# Patient Record
Sex: FEMALE | Race: WHITE | HISPANIC OR LATINO | Employment: UNEMPLOYED | URBAN - METROPOLITAN AREA
[De-identification: names, ages, dates, MRNs, and addresses within clinical notes are randomized per-mention and may not be internally consistent; named-entity substitution may affect disease eponyms.]

---

## 2017-03-01 ENCOUNTER — HOSPITAL ENCOUNTER (EMERGENCY)
Facility: HOSPITAL | Age: 22
Discharge: HOME | End: 2017-03-01
Admitting: EMERGENCY MEDICINE
Payer: COMMERCIAL

## 2017-03-01 VITALS
OXYGEN SATURATION: 100 % | RESPIRATION RATE: 16 BRPM | SYSTOLIC BLOOD PRESSURE: 117 MMHG | HEART RATE: 84 BPM | TEMPERATURE: 98 F | WEIGHT: 127.25 LBS | DIASTOLIC BLOOD PRESSURE: 61 MMHG

## 2017-03-01 DIAGNOSIS — Z76.0 MEDICATION REFILL: Primary | ICD-10-CM

## 2017-03-01 PROCEDURE — 99281 EMR DPT VST MAYX REQ PHY/QHP: CPT

## 2017-03-01 RX ORDER — ALBUTEROL SULFATE 90 UG/1
2 AEROSOL, METERED RESPIRATORY (INHALATION) EVERY 6 HOURS PRN
COMMUNITY
End: 2018-10-02 | Stop reason: SDUPTHER

## 2017-03-01 RX ORDER — ALBUTEROL SULFATE 90 UG/1
2 AEROSOL, METERED RESPIRATORY (INHALATION) ONCE
Status: COMPLETED | OUTPATIENT
Start: 2017-03-01 | End: 2017-03-01

## 2017-03-01 RX ORDER — ALBUTEROL SULFATE 2.5 MG/3ML
2.5 SOLUTION RESPIRATORY (INHALATION) EVERY 6 HOURS PRN
Qty: 75 ML | Refills: 0 | Status: SHIPPED | OUTPATIENT
Start: 2017-03-01 | End: 2017-03-31

## 2017-03-01 RX ADMIN — ALBUTEROL SULFATE 2 PUFF: 90 AEROSOL, METERED RESPIRATORY (INHALATION) at 08:55

## 2017-05-28 ENCOUNTER — HOSPITAL ENCOUNTER (EMERGENCY)
Facility: HOSPITAL | Age: 22
Discharge: HOME | End: 2017-05-28
Admitting: EMERGENCY MEDICINE
Payer: COMMERCIAL

## 2017-05-28 VITALS
WEIGHT: 131 LBS | OXYGEN SATURATION: 100 % | DIASTOLIC BLOOD PRESSURE: 67 MMHG | RESPIRATION RATE: 18 BRPM | TEMPERATURE: 98.5 F | SYSTOLIC BLOOD PRESSURE: 119 MMHG | HEART RATE: 90 BPM

## 2017-05-28 DIAGNOSIS — J45.901 ACUTE ASTHMA EXACERBATION: Primary | ICD-10-CM

## 2017-05-28 PROCEDURE — 94640 AIRWAY INHALATION TREATMENT: CPT

## 2017-05-28 PROCEDURE — 99283 EMERGENCY DEPT VISIT LOW MDM: CPT

## 2017-05-28 RX ORDER — METHYLPREDNISOLONE 4 MG/1
TABLET ORAL
Qty: 21 TABLET | Refills: 0 | Status: SHIPPED | OUTPATIENT
Start: 2017-05-28 | End: 2018-10-02

## 2017-05-28 RX ORDER — PREDNISONE 20 MG/1
60 TABLET ORAL ONCE
Status: COMPLETED | OUTPATIENT
Start: 2017-05-28 | End: 2017-05-28

## 2017-05-28 RX ORDER — ALBUTEROL SULFATE 90 UG/1
2 AEROSOL, METERED RESPIRATORY (INHALATION) EVERY 4 HOURS PRN
Qty: 1 INHALER | Refills: 0 | Status: SHIPPED | OUTPATIENT
Start: 2017-05-28 | End: 2017-05-30

## 2017-05-28 RX ADMIN — PREDNISONE 60 MG: 20 TABLET ORAL at 15:26

## 2017-05-28 RX ADMIN — ALBUTEROL SULFATE 5 MG: 2.5 SOLUTION RESPIRATORY (INHALATION) at 15:27

## 2017-05-28 RX ADMIN — IPRATROPIUM BROMIDE 0.5 MG: 0.5 SOLUTION RESPIRATORY (INHALATION) at 15:27

## 2017-06-29 ENCOUNTER — ALLSCRIPTS OFFICE VISIT (OUTPATIENT)
Dept: OTHER | Facility: OTHER | Age: 22
End: 2017-06-29

## 2017-08-12 ENCOUNTER — GENERIC CONVERSION - ENCOUNTER (OUTPATIENT)
Dept: OTHER | Facility: OTHER | Age: 22
End: 2017-08-12

## 2017-10-30 ENCOUNTER — GENERIC CONVERSION - ENCOUNTER (OUTPATIENT)
Dept: OTHER | Facility: OTHER | Age: 22
End: 2017-10-30

## 2017-11-07 ENCOUNTER — GENERIC CONVERSION - ENCOUNTER (OUTPATIENT)
Dept: OTHER | Facility: OTHER | Age: 22
End: 2017-11-07

## 2018-01-02 ENCOUNTER — HOSPITAL ENCOUNTER (EMERGENCY)
Facility: HOSPITAL | Age: 23
Discharge: HOME | End: 2018-01-02
Admitting: EMERGENCY MEDICINE
Payer: COMMERCIAL

## 2018-01-02 VITALS
OXYGEN SATURATION: 100 % | RESPIRATION RATE: 18 BRPM | DIASTOLIC BLOOD PRESSURE: 65 MMHG | SYSTOLIC BLOOD PRESSURE: 125 MMHG | WEIGHT: 125 LBS | TEMPERATURE: 98.3 F | HEART RATE: 105 BPM

## 2018-01-02 DIAGNOSIS — H66.92 LEFT OTITIS MEDIA: Primary | ICD-10-CM

## 2018-01-02 DIAGNOSIS — J06.9 URI (UPPER RESPIRATORY INFECTION): ICD-10-CM

## 2018-01-02 PROCEDURE — 99282 EMERGENCY DEPT VISIT SF MDM: CPT

## 2018-01-03 NOTE — ED PROVIDER NOTES
History  Chief Complaint   Patient presents with    Sore Throat     Patient reports was seen at urgent care today and diagnosed with strep throat  states, "now I have this crazy ear pain in my left ear  And my throat hasn't gotten any better"       22y  o female with PMH of asthma presents to the ER for left ear pain for 1 day  Patient states she was seen at an Urgent Care this morning and was diagnosed with strep throat  She was given Amoxicillin, which she took 2 doses of today  Patient states "I don't feel any better"  Patient has also been taking Motrin, with her last dose being 1 hour ago  She describes her pain as sharp and radiating between her throat and ear  She rates her pain 10/10 and states it is constant  Associated symptoms: fever, rhinorrhea and sore throat  She denies chills, chest pain, dyspnea, N/V/D, abdominal pain, weakness or paresthesias  History provided by:  Patient   used: No        Prior to Admission Medications   Prescriptions Last Dose Informant Patient Reported? Taking? EPINEPHrine 1 MG/ML KIT   Yes No   Sig: Inject 1 kit as directed as needed   Methylprednisolone 4 MG TBPK   No No   Sig: Use as directed on package   albuterol (PROVENTIL HFA,VENTOLIN HFA) 90 mcg/act inhaler   Yes No   Sig: Inhale 2 puffs every 6 (six) hours as needed for wheezing      Facility-Administered Medications: None       Past Medical History:   Diagnosis Date    Asthma        Past Surgical History:   Procedure Laterality Date    NO PAST SURGERIES         History reviewed  No pertinent family history  I have reviewed and agree with the history as documented  Social History   Substance Use Topics    Smoking status: Never Smoker    Smokeless tobacco: Never Used    Alcohol use No        Review of Systems   Constitutional: Positive for fever  Negative for activity change, appetite change and chills  HENT: Positive for ear pain (left), rhinorrhea and sore throat   Negative for congestion, drooling, ear discharge and facial swelling  Respiratory: Negative for shortness of breath  Cardiovascular: Negative for chest pain  Gastrointestinal: Negative for abdominal pain, diarrhea, nausea and vomiting  Musculoskeletal: Negative for neck stiffness  Skin: Negative for rash  Allergic/Immunologic: Positive for food allergies  Neurological: Negative for weakness and numbness  Physical Exam  ED Triage Vitals [01/02/18 1954]   Temperature Pulse Respirations Blood Pressure SpO2   98 3 °F (36 8 °C) 105 18 125/65 100 %      Temp Source Heart Rate Source Patient Position - Orthostatic VS BP Location FiO2 (%)   Oral Monitor Sitting Right arm --      Pain Score       Worst Possible Pain           Orthostatic Vital Signs  Vitals:    01/02/18 1954   BP: 125/65   Pulse: 105   Patient Position - Orthostatic VS: Sitting       Physical Exam   Constitutional:  Non-toxic appearance  No distress  HENT:   Head: Normocephalic and atraumatic  Right Ear: Tympanic membrane, external ear and ear canal normal  No drainage, swelling or tenderness  No foreign bodies  Tympanic membrane is not erythematous  No hemotympanum  Left Ear: External ear and ear canal normal  No drainage, swelling or tenderness  No foreign bodies  Tympanic membrane is injected and erythematous  No hemotympanum  Nose: Nose normal    Mouth/Throat: Uvula is midline and mucous membranes are normal  No uvula swelling  Posterior oropharyngeal erythema present  No posterior oropharyngeal edema or tonsillar abscesses  No tonsillar exudate  Neck: Normal range of motion and phonation normal  Neck supple  No tracheal deviation present  Cardiovascular: Normal rate, regular rhythm, S1 normal, S2 normal and normal heart sounds  Exam reveals no gallop and no friction rub  No murmur heard  Pulmonary/Chest: Effort normal and breath sounds normal  No respiratory distress  She has no decreased breath sounds  She has no wheezes   She has no rhonchi  She has no rales  She exhibits no tenderness  Neurological: She is alert  GCS eye subscore is 4  GCS verbal subscore is 5  GCS motor subscore is 6  Skin: Skin is warm and dry  No rash noted  Psychiatric: She has a normal mood and affect  Nursing note and vitals reviewed  ED Medications  Medications - No data to display    Diagnostic Studies  Results Reviewed     None                 No orders to display              Procedures  Procedures       Phone Contacts  ED Phone Contact    ED Course  ED Course                                MDM  Number of Diagnoses or Management Options  Left otitis media: new and does not require workup  URI (upper respiratory infection): new and does not require workup  Diagnosis management comments: DDX consists of but not limited to: otitis media, otitis externa, cerumen impaction, TM perforation, URI, viral syndrome, strep, abscess, mono    At discharge, I instructed the patient to:  -follow up with pcp  -take Tylenol or Motrin for pain or fever  -take Amoxicillin as previously prescribed for strep throat/ear infection  -rest and drink plenty of fluids  -return to the ER if symptoms worsened or new symptoms arose  Patient agreed to this plan and was stable at time of discharge  Patient Progress  Patient progress: stable    CritCare Time    Disposition  Final diagnoses:   Left otitis media   URI (upper respiratory infection)     Time reflects when diagnosis was documented in both MDM as applicable and the Disposition within this note     Time User Action Codes Description Comment    1/2/2018  8:49 PM Moses Ruvalcaba Add [H66 92] Left otitis media     1/2/2018  8:49 PM Kelsi BROWN Add [J06 9] URI (upper respiratory infection)       ED Disposition     ED Disposition Condition Comment    Discharge  [de-identified] M Greers discharge to home/self care      Condition at discharge: Stable        Follow-up Information     Follow up With Specialties Details Why Contact Info    Brian Lackey MD Family Medicine Schedule an appointment as soon as possible for a visit in 1 day  [address]  [phone]          Discharge Medication List as of 1/2/2018  8:51 PM      CONTINUE these medications which have NOT CHANGED    Details   albuterol (PROVENTIL HFA,VENTOLIN HFA) 90 mcg/act inhaler Inhale 2 puffs every 6 (six) hours as needed for wheezing, Until Discontinued, Historical Med      EPINEPHrine 1 MG/ML KIT Inject 1 kit as directed as needed, Until Discontinued, Historical Med      Methylprednisolone 4 MG TBPK Use as directed on package, Print           No discharge procedures on file      ED Provider  Electronically Signed by           Lisa Ledbetter PA-C  01/03/18 0107

## 2018-01-03 NOTE — DISCHARGE INSTRUCTIONS
Otitis Media   WHAT YOU NEED TO KNOW:   Otitis media is an ear infection  DISCHARGE INSTRUCTIONS:   Medicines:  · Ibuprofen or acetaminophen  helps decrease your pain and fever  They are available without a doctor's order  Ask your healthcare provider which medicine is right for you  Ask how much to take and how often to take it  These medicines can cause stomach bleeding if not taken correctly  Ibuprofen can cause kidney damage  Do not take ibuprofen if you have kidney disease, an ulcer, or allergies to aspirin  Acetaminophen can cause liver damage  Do not drink alcohol if you take acetaminophen  · Ear drops  help treat your ear pain  · Antibiotics  help treat a bacterial infection that caused your ear infection  · Take your medicine as directed  Contact your healthcare provider if you think your medicine is not helping or if you have side effects  Tell him or her if you are allergic to any medicine  Keep a list of the medicines, vitamins, and herbs you take  Include the amounts, and when and why you take them  Bring the list or the pill bottles to follow-up visits  Carry your medicine list with you in case of an emergency  Heat or ice:   · Heat  may be used to decrease your pain  Place a warm, moist washcloth on your ear  Apply for 15 to 20 minutes, 3 to 4 times a day    · Ice  helps decrease swelling and pain  Use an ice pack or put crushed ice in a plastic bag  Cover the ice pack with a towel and place it on your ear for 15 to 20 minutes, 3 to 4 times a day for 2 days  Prevent otitis media:   · Wash your hands often  Use soap and water  Wash your hands after you use the bathroom, change a child's diapers, or sneeze  Wash your hands before you prepare or eat food  · Stay away from people who are ill  Some germs are easily and quickly spread through contact  Return to work or school: You may return to work or school when your fever is gone     Follow up with your healthcare provider as directed:  Write down your questions so you remember to ask them during your visits  Contact your healthcare provider if:   · Your ear pain gets worse or does not go away, even after treatment  · The outside of your ear is red or swollen  · You have vomiting or diarrhea  · You have fluid coming from your ear  · You have questions or concerns about your condition or care  Return to the emergency department if:   · You have a seizure  · You have a fever and a stiff neck  © 2017 [employer]  Information is for End User's use only and may not be sold, redistributed or otherwise used for commercial purposes  All illustrations and images included in CareNotes® are the copyrighted property of [employer]  or Bhupendra Shafer  The above information is an  only  It is not intended as medical advice for individual conditions or treatments  Talk to your doctor, nurse or pharmacist before following any medical regimen to see if it is safe and effective for you  Upper Respiratory Infection   WHAT YOU NEED TO KNOW:   An upper respiratory infection is also called the common cold  It is an infection that can affect your nose, throat, ears, and sinuses  For healthy people, the common cold is usually not serious and does not need special treatment  Cold symptoms are usually worst for the first 3 to 5 days  Most people get better in 7 to 14 days  You may continue to cough for 2 to 3 weeks  Colds are caused by viruses and do not get better with antibiotics  DISCHARGE INSTRUCTIONS:   Seek care immediately if:   · You have chest pain or trouble breathing  Contact your healthcare provider if:   · You have a fever over 102ºF (39°C)  · Your sore throat gets worse or you see white or yellow spots in your throat  · Your symptoms get worse after 3 to 5 days or your cold is not better in 14 days  · You have a rash anywhere on your skin      · You have large, tender lumps in your neck  · You have thick, green, or yellow drainage from your nose  · You cough up thick yellow, green, or bloody mucus  · You are vomiting for more than 24 hours and cannot keep fluids down  · You have a bad earache  · You have questions or concerns about your condition or care  Medicines: You may need any of the following:  · Decongestants  help reduce nasal congestion and help you breathe more easily  If you take decongestant pills, they may make you feel restless or cause problems with your sleep  Do not use decongestant sprays for more than a few days  · Cough suppressants  help reduce coughing  Ask your healthcare provider which type of cough medicine is best for you  · NSAIDs , such as ibuprofen, help decrease swelling, pain, and fever  NSAIDs can cause stomach bleeding or kidney problems in certain people  If you take blood thinner medicine, always ask your healthcare provider if NSAIDs are safe for you  Always read the medicine label and follow directions  · Acetaminophen  decreases pain and fever  It is available without a doctor's order  Ask how much to take and how often to take it  Follow directions  Read the labels of all other medicines you are using to see if they also contain acetaminophen, or ask your doctor or pharmacist  Acetaminophen can cause liver damage if not taken correctly  Do not use more than 4 grams (4,000 milligrams) total of acetaminophen in one day  · Take your medicine as directed  Contact your healthcare provider if you think your medicine is not helping or if you have side effects  Tell him or her if you are allergic to any medicine  Keep a list of the medicines, vitamins, and herbs you take  Include the amounts, and when and why you take them  Bring the list or the pill bottles to follow-up visits  Carry your medicine list with you in case of an emergency    Follow up with your healthcare provider as directed:  Write down your questions so you remember to ask them during your visits  Self-care:   · Rest as much as possible  Slowly start to do more each day  · Drink more liquids as directed  Liquids will help thin and loosen mucus so you can cough it up  Liquids will also help prevent dehydration  Liquids that help prevent dehydration include water, fruit juice, and broth  Do not drink liquids that contain caffeine  Caffeine can increase your risk for dehydration  Ask your healthcare provider how much liquid to drink each day  · Soothe a sore throat  Gargle with warm salt water  This helps your sore throat feel better  Make salt water by dissolving ¼ teaspoon salt in 1 cup warm water  You may also suck on hard candy or throat lozenges  You may use a sore throat spray  · Use a humidifier or vaporizer  Use a cool mist humidifier or a vaporizer to increase air moisture in your home  This may make it easier for you to breathe and help decrease your cough  · Use saline nasal drops as directed  These help relieve congestion  · Apply petroleum-based jelly around the outside of your nostrils  This can decrease irritation from blowing your nose  · Do not smoke  Nicotine and other chemicals in cigarettes and cigars can make your symptoms worse  They can also cause infections such as bronchitis or pneumonia  Ask your healthcare provider for information if you currently smoke and need help to quit  E-cigarettes or smokeless tobacco still contain nicotine  Talk to your healthcare provider before you use these products  Prevent spreading your cold to others:   · Try to stay away from other people during the first 2 to 3 days of your cold when it is more easily spread  · Do not share food or drinks  · Do not share hand towels with household members  · Wash your hands often, especially after you blow your nose  Turn away from other people and cover your mouth and nose with a tissue when you sneeze or cough         © 2017 [employer] is for End User's use only and may not be sold, redistributed or otherwise used for commercial purposes  All illustrations and images included in CareNotes® are the copyrighted property of A [employer] M [employer] Inc  or Bhupendra Shafre  The above information is an  only  It is not intended as medical advice for individual conditions or treatments  Talk to your doctor, nurse or pharmacist before following any medical regimen to see if it is safe and effective for you  DISCHARGE INSTRUCTIONS:    FOLLOW UP WITH YOUR PRIMARY CARE PROVIDER OR THE [address] [place] [address]  MAKE AN APPOINTMENT TO BE SEEN  TAKE TYLENOL OR MOTRIN FOR PAIN  CONTINUE TAKING AMOXICILLIN AS PREVIOUSLY PRESCRIBED  IF RASH, SHORTNESS OF BREATH OR TROUBLE SWALLOWING, STOP TAKING THE MEDICATION AND BE SEEN  REST AND DRINK PLENTY OF FLUIDS  IF SYMPTOMS WORSEN OR NEW SYMPTOMS ARISE, RETURN TO THE ER TO BE SEEN

## 2018-01-12 VITALS
WEIGHT: 133.31 LBS | TEMPERATURE: 97.9 F | DIASTOLIC BLOOD PRESSURE: 64 MMHG | BODY MASS INDEX: 25.17 KG/M2 | OXYGEN SATURATION: 100 % | SYSTOLIC BLOOD PRESSURE: 108 MMHG | HEIGHT: 61 IN | HEART RATE: 86 BPM | RESPIRATION RATE: 18 BRPM

## 2018-01-13 NOTE — MISCELLANEOUS
Message  Message Free Text Note Form: Patient called on-call service stating that she called at the end of the week to have a refill for her rescue inhaler  I informed patient that I would refill rescue inhaler but if she has gone through 2 to rescue her allergies in about a month and a half she needs to make a follow-up to find another medication to better control her asthma  Plan    1   Ventolin  (90 Base) MCG/ACT Inhalation Aerosol Solution; INHALE 2   PUFFS EVERY 4-6 HOURS AS NEEDED    Signatures   Electronically signed by : GRICEL Goodman; Aug 12 2017  8:10AM EST                       (Author)

## 2018-01-13 NOTE — MISCELLANEOUS
Plan    1  Ventolin  (90 Base) MCG/ACT Inhalation Aerosol Solution; INHALE 2   PUFFS EVERY 4-6 HOURS AS NEEDED    Discussion/Summary  Patient called on call provider with need for refill of ventolin inhaler  Notes reviewed and patient has not scheduled an asthma follow up visit since her initial visit in June States she has been trying to get through to the office and has been unable to get through  She uses the rescue inhaler 2-3 times daily  She states she is using Flovent, but is not using montelukast bc she thought it was for allergies  She will begin taking that tonight  She will continue the Flovent  I did refill the ventolin with no refills  I will send a task to the schedulers to contact the patient to schedule an asthma follow up visit with PCP       Signatures   Electronically signed by : TERE Ramirez; Oct 30 2017  5:48PM EST                       (Author)

## 2018-01-22 VITALS
WEIGHT: 131.38 LBS | HEIGHT: 62 IN | HEART RATE: 80 BPM | SYSTOLIC BLOOD PRESSURE: 104 MMHG | DIASTOLIC BLOOD PRESSURE: 64 MMHG | TEMPERATURE: 96.6 F | OXYGEN SATURATION: 100 % | BODY MASS INDEX: 24.18 KG/M2 | RESPIRATION RATE: 18 BRPM

## 2018-02-21 DIAGNOSIS — J45.40 MODERATE PERSISTENT ASTHMA WITHOUT COMPLICATION: Primary | ICD-10-CM

## 2018-02-26 DIAGNOSIS — J45.909 ASTHMA, UNSPECIFIED ASTHMA SEVERITY, UNSPECIFIED WHETHER COMPLICATED, UNSPECIFIED WHETHER PERSISTENT: Primary | ICD-10-CM

## 2018-02-27 RX ORDER — ALBUTEROL SULFATE 90 UG/1
POWDER, METERED RESPIRATORY (INHALATION)
Qty: 1 EACH | Refills: 1 | Status: SHIPPED | OUTPATIENT
Start: 2018-02-27 | End: 2018-08-03 | Stop reason: SDUPTHER

## 2018-08-03 DIAGNOSIS — J45.40 MODERATE PERSISTENT ASTHMA WITHOUT COMPLICATION: ICD-10-CM

## 2018-08-07 RX ORDER — ALBUTEROL SULFATE 90 UG/1
POWDER, METERED RESPIRATORY (INHALATION)
Qty: 1 EACH | Refills: 0 | Status: SHIPPED | OUTPATIENT
Start: 2018-08-07 | End: 2018-10-02

## 2018-09-12 ENCOUNTER — HOSPITAL ENCOUNTER (EMERGENCY)
Facility: HOSPITAL | Age: 23
Discharge: HOME | End: 2018-09-12
Attending: EMERGENCY MEDICINE
Payer: COMMERCIAL

## 2018-09-12 VITALS
HEART RATE: 83 BPM | SYSTOLIC BLOOD PRESSURE: 139 MMHG | TEMPERATURE: 98.2 F | DIASTOLIC BLOOD PRESSURE: 65 MMHG | RESPIRATION RATE: 18 BRPM | OXYGEN SATURATION: 99 % | WEIGHT: 125 LBS | BODY MASS INDEX: 23.24 KG/M2

## 2018-09-12 DIAGNOSIS — R60.0 HAND EDEMA: Primary | ICD-10-CM

## 2018-09-12 DIAGNOSIS — R60.0 FACIAL EDEMA: ICD-10-CM

## 2018-09-12 LAB
ALBUMIN SERPL BCP-MCNC: 4 G/DL (ref 3.5–5)
ALP SERPL-CCNC: 74 U/L (ref 46–116)
ALT SERPL W P-5'-P-CCNC: 23 U/L (ref 12–78)
ANION GAP SERPL CALCULATED.3IONS-SCNC: 8 MMOL/L (ref 4–13)
AST SERPL W P-5'-P-CCNC: 27 U/L (ref 5–45)
BASOPHILS # BLD AUTO: 0.05 THOUSANDS/ΜL (ref 0–0.1)
BASOPHILS NFR BLD AUTO: 1 % (ref 0–1)
BILIRUB SERPL-MCNC: 0.35 MG/DL (ref 0.2–1)
BILIRUB UR QL STRIP: NEGATIVE
BUN SERPL-MCNC: 7 MG/DL (ref 5–25)
CALCIUM SERPL-MCNC: 9 MG/DL (ref 8.3–10.1)
CHLORIDE SERPL-SCNC: 102 MMOL/L (ref 100–108)
CLARITY UR: CLEAR
CLARITY, POC: CLEAR
CO2 SERPL-SCNC: 25 MMOL/L (ref 21–32)
COLOR UR: YELLOW
COLOR, POC: YELLOW
CREAT SERPL-MCNC: 0.64 MG/DL (ref 0.6–1.3)
CRP SERPL QL: 4.4 MG/L
EOSINOPHIL # BLD AUTO: 0.24 THOUSAND/ΜL (ref 0–0.61)
EOSINOPHIL NFR BLD AUTO: 3 % (ref 0–6)
ERYTHROCYTE [DISTWIDTH] IN BLOOD BY AUTOMATED COUNT: 12.8 % (ref 11.6–15.1)
ERYTHROCYTE [SEDIMENTATION RATE] IN BLOOD: 6 MM/HOUR (ref 0–20)
EXT PREG TEST URINE: NEGATIVE
GFR SERPL CREATININE-BSD FRML MDRD: 129 ML/MIN/1.73SQ M
GLUCOSE SERPL-MCNC: 100 MG/DL (ref 65–140)
GLUCOSE UR STRIP-MCNC: NEGATIVE MG/DL
HCT VFR BLD AUTO: 41.5 % (ref 34.8–46.1)
HGB BLD-MCNC: 13.6 G/DL (ref 11.5–15.4)
HGB UR QL STRIP.AUTO: NEGATIVE
IMM GRANULOCYTES # BLD AUTO: 0.02 THOUSAND/UL (ref 0–0.2)
IMM GRANULOCYTES NFR BLD AUTO: 0 % (ref 0–2)
KETONES UR STRIP-MCNC: NEGATIVE MG/DL
LEUKOCYTE ESTERASE UR QL STRIP: NEGATIVE
LYMPHOCYTES # BLD AUTO: 3.41 THOUSANDS/ΜL (ref 0.6–4.47)
LYMPHOCYTES NFR BLD AUTO: 43 % (ref 14–44)
MCH RBC QN AUTO: 28 PG (ref 26.8–34.3)
MCHC RBC AUTO-ENTMCNC: 32.8 G/DL (ref 31.4–37.4)
MCV RBC AUTO: 86 FL (ref 82–98)
MONOCYTES # BLD AUTO: 0.69 THOUSAND/ΜL (ref 0.17–1.22)
MONOCYTES NFR BLD AUTO: 9 % (ref 4–12)
NEUTROPHILS # BLD AUTO: 3.6 THOUSANDS/ΜL (ref 1.85–7.62)
NEUTS SEG NFR BLD AUTO: 44 % (ref 43–75)
NITRITE UR QL STRIP: NEGATIVE
NRBC BLD AUTO-RTO: 0 /100 WBCS
PH UR STRIP.AUTO: 5.5 [PH] (ref 4.5–8)
PLATELET # BLD AUTO: 342 THOUSANDS/UL (ref 149–390)
PMV BLD AUTO: 9.9 FL (ref 8.9–12.7)
POTASSIUM SERPL-SCNC: 4 MMOL/L (ref 3.5–5.3)
PROT SERPL-MCNC: 8 G/DL (ref 6.4–8.2)
PROT UR STRIP-MCNC: NEGATIVE MG/DL
RBC # BLD AUTO: 4.85 MILLION/UL (ref 3.81–5.12)
SODIUM SERPL-SCNC: 135 MMOL/L (ref 136–145)
SP GR UR STRIP.AUTO: >=1.03 (ref 1–1.03)
UROBILINOGEN UR QL STRIP.AUTO: 0.2 E.U./DL
WBC # BLD AUTO: 8.01 THOUSAND/UL (ref 4.31–10.16)

## 2018-09-12 PROCEDURE — 85025 COMPLETE CBC W/AUTO DIFF WBC: CPT | Performed by: NURSE PRACTITIONER

## 2018-09-12 PROCEDURE — 85652 RBC SED RATE AUTOMATED: CPT | Performed by: NURSE PRACTITIONER

## 2018-09-12 PROCEDURE — 81025 URINE PREGNANCY TEST: CPT | Performed by: NURSE PRACTITIONER

## 2018-09-12 PROCEDURE — 86038 ANTINUCLEAR ANTIBODIES: CPT | Performed by: NURSE PRACTITIONER

## 2018-09-12 PROCEDURE — 80053 COMPREHEN METABOLIC PANEL: CPT | Performed by: NURSE PRACTITIONER

## 2018-09-12 PROCEDURE — 99283 EMERGENCY DEPT VISIT LOW MDM: CPT

## 2018-09-12 PROCEDURE — 86140 C-REACTIVE PROTEIN: CPT | Performed by: NURSE PRACTITIONER

## 2018-09-12 PROCEDURE — 36415 COLL VENOUS BLD VENIPUNCTURE: CPT | Performed by: NURSE PRACTITIONER

## 2018-09-12 PROCEDURE — 81003 URINALYSIS AUTO W/O SCOPE: CPT

## 2018-09-12 RX ORDER — DIPHENHYDRAMINE HCL 25 MG
50 TABLET ORAL ONCE
Status: COMPLETED | OUTPATIENT
Start: 2018-09-12 | End: 2018-09-12

## 2018-09-12 RX ORDER — PREDNISONE 10 MG/1
TABLET ORAL
Qty: 30 TABLET | Refills: 0 | Status: SHIPPED | OUTPATIENT
Start: 2018-09-12 | End: 2018-10-02

## 2018-09-12 RX ADMIN — DIPHENHYDRAMINE HCL 50 MG: 25 TABLET ORAL at 04:59

## 2018-09-12 RX ADMIN — PREDNISONE 50 MG: 20 TABLET ORAL at 04:59

## 2018-09-12 NOTE — ED PROVIDER NOTES
History  Chief Complaint   Patient presents with    Hand Swelling     pt reports yesterday started with right hand swelling, then woke up this morning with swelling to forehead, denies injury, reports itchiness, denies any new medications or soaps    Facial Swelling     This is a 23year old female who states has had some right hand swelling and woke up this morning with a large swollen area on the left forehead and left cheek  She denies any travel, insect bite, injury or autoimmune disorder  Pt states it is not painful  She has not placed ice but has used vicks  Denies fevers chills, n/v/d  LMP 2 weeks ago         History provided by:  Patient and medical records   used: No        Prior to Admission Medications   Prescriptions Last Dose Informant Patient Reported? Taking? Albuterol Sulfate (PROAIR RESPICLICK) 886 (90 Base) MCG/ACT AEPB   No No   Si inhalations every 4-6 hours as needed for shortness of breath or wheezing   EPINEPHrine 1 MG/ML KIT   Yes No   Sig: Inject 1 kit as directed as needed   Methylprednisolone 4 MG TBPK   No No   Sig: Use as directed on package   PROAIR RESPICLICK 352 (90 Base) MCG/ACT AEPB   No No   Sig: INHALE 2 PUFFS EVERY 4-6 HOURS IF NEEDED   albuterol (PROVENTIL HFA,VENTOLIN HFA) 90 mcg/act inhaler   Yes No   Sig: Inhale 2 puffs every 6 (six) hours as needed for wheezing      Facility-Administered Medications: None       Past Medical History:   Diagnosis Date    Asthma        Past Surgical History:   Procedure Laterality Date    NO PAST SURGERIES         History reviewed  No pertinent family history  I have reviewed and agree with the history as documented  Social History   Substance Use Topics    Smoking status: Never Smoker    Smokeless tobacco: Never Used    Alcohol use No        Review of Systems   Constitutional: Negative  HENT: Positive for facial swelling  Eyes: Negative  Respiratory: Negative  Cardiovascular: Negative  Gastrointestinal: Negative  Endocrine: Negative  Genitourinary: Negative  Musculoskeletal:        Right hand swelling    Skin: Negative  Allergic/Immunologic: Negative  Neurological: Negative  Hematological: Negative  Psychiatric/Behavioral: Negative  Physical Exam  Physical Exam   Constitutional: She is oriented to person, place, and time  She appears well-developed and well-nourished  No distress  HENT:   Head:       Right Ear: External ear normal    Left Ear: External ear normal    Nose: Nose normal    Mouth/Throat: Oropharynx is clear and moist  No oropharyngeal exudate  Eyes: EOM are normal  Pupils are equal, round, and reactive to light  Neck: Normal range of motion  Neck supple  Cardiovascular: Normal rate and regular rhythm  Pulmonary/Chest: Effort normal and breath sounds normal    Abdominal: Soft  Bowel sounds are normal    Musculoskeletal: Normal range of motion  Neurological: She is alert and oriented to person, place, and time  Skin: Skin is warm and dry  Capillary refill takes less than 2 seconds  She is not diaphoretic  + 1 pitting edema to dorsal surface of right hand and lower 1/3 of fingers  Fingers are tight  No redness, or warmth  Difficulty moving fingers due to swelling  3 cm area on left forehead that is swollen, no warmth or redness    3 cm area on left cheek that is swollen, no warmth or redness      No notable insect bites or lesions on skin or areas of edema  Psychiatric: She has a normal mood and affect  Her behavior is normal  Judgment and thought content normal    Nursing note and vitals reviewed        Vital Signs  ED Triage Vitals [09/12/18 0438]   Temperature Pulse Respirations Blood Pressure SpO2   98 2 °F (36 8 °C) 83 18 139/65 99 %      Temp Source Heart Rate Source Patient Position - Orthostatic VS BP Location FiO2 (%)   Oral Monitor Sitting Left arm --      Pain Score       4           Vitals:    09/12/18 0438   BP: 139/65 Pulse: 83   Patient Position - Orthostatic VS: Sitting       Visual Acuity      ED Medications  Medications   diphenhydrAMINE (BENADRYL) tablet 50 mg (50 mg Oral Given 9/12/18 0459)   predniSONE tablet 50 mg (50 mg Oral Given 9/12/18 0459)       Diagnostic Studies  Results Reviewed     Procedure Component Value Units Date/Time    Sedimentation rate, automated [[id number]]  (Normal) Collected:  09/12/18 0451    Lab Status:  Final result Specimen:  Blood from Arm, Right Updated:  09/12/18 0559     Sed Rate 6 mm/hour     C-reactive protein [[id number]]  (Abnormal) Collected:  09/12/18 0451    Lab Status:  Final result Specimen:  Blood from Arm, Right Updated:  09/12/18 0521     CRP 4 4 (H) mg/L     Comprehensive metabolic panel [[id number]]  (Abnormal) Collected:  09/12/18 0451    Lab Status:  Final result Specimen:  Blood from Arm, Right Updated:  09/12/18 0511     Sodium 135 (L) mmol/L      Potassium 4 0 mmol/L      Chloride 102 mmol/L      CO2 25 mmol/L      ANION GAP 8 mmol/L      BUN 7 mg/dL      Creatinine 0 64 mg/dL      Glucose 100 mg/dL      Calcium 9 0 mg/dL      AST 27 U/L      ALT 23 U/L      Alkaline Phosphatase 74 U/L      Total Protein 8 0 g/dL      Albumin 4 0 g/dL      Total Bilirubin 0 35 mg/dL      eGFR 129 ml/min/1 73sq m     Narrative:         National Kidney Disease Education Program recommendations are as follows:  GFR calculation is accurate only with a steady state creatinine  Chronic Kidney disease less than 60 ml/min/1 73 sq  meters  Kidney failure less than 15 ml/min/1 73 sq  meters      CBC and differential [[id number]] Collected:  09/12/18 0451    Lab Status:  Final result Specimen:  Blood from Arm, Right Updated:  09/12/18 0457     WBC 8 01 Thousand/uL      RBC 4 85 Million/uL      Hemoglobin 13 6 g/dL      Hematocrit 41 5 %      MCV 86 fL      MCH 28 0 pg      MCHC 32 8 g/dL      RDW 12 8 %      MPV 9 9 fL      Platelets 242 Thousands/uL      nRBC 0 /100 WBCs      Neutrophils Relative 44 % Immat GRANS % 0 %      Lymphocytes Relative 43 %      Monocytes Relative 9 %      Eosinophils Relative 3 %      Basophils Relative 1 %      Neutrophils Absolute 3 60 Thousands/µL      Immature Grans Absolute 0 02 Thousand/uL      Lymphocytes Absolute 3 41 Thousands/µL      Monocytes Absolute 0 69 Thousand/µL      Eosinophils Absolute 0 24 Thousand/µL      Basophils Absolute 0 05 Thousands/µL     MAURICIO Screen w/ Reflex to Titer/Pattern [[id number]] Collected:  09/12/18 0451    Lab Status: In process Specimen:  Blood from Arm, Right Updated:  09/12/18 0453    POCT urinalysis dipstick [[id number]]  (Normal) Resulted:  09/12/18 0452    Lab Status:  Final result Specimen:  Urine Updated:  09/12/18 0452     Color, UA yellow     Clarity, UA clear    POCT pregnancy, urine [[id number]]  (Normal) Resulted:  09/12/18 0452    Lab Status:  Final result Updated:  09/12/18 0452     EXT PREG TEST UR (Ref: Negative) negative    ED Urine Macroscopic [[id number]] Collected:  09/12/18 0500    Lab Status:  Final result Specimen:  Urine Updated:  09/12/18 0451     Color, UA Yellow     Clarity, UA Clear     pH, UA 5 5     Leukocytes, UA Negative     Nitrite, UA Negative     Protein, UA Negative mg/dl      Glucose, UA Negative mg/dl      Ketones, UA Negative mg/dl      Urobilinogen, UA 0 2 E U /dl      Bilirubin, UA Negative     Blood, UA Negative     Specific Gravity, UA >=1 030    Narrative:       CLINITEK RESULT                 No orders to display              Procedures  Procedures       Phone Contacts  ED Phone Contact    ED Course  ED Course as of Sep 12 0604   Wed Sep 12, 2018   6807 Labs reviewed and discussed with pt  CRP 4 4  Other labs unremarkable  Waiting on Sed rate  Will give benadryl and prednisone and place on OP prednisone with f/u with PCP and rheumatology  Pt verbalizes understanding of d/c instructions and follow up                                   MDM  Number of Diagnoses or Management Options  Diagnosis management comments: Right hand edema  Left forehead and cheek edema  Concern for possible autoimmune disorder      Labs  Benadryl   Prednisone  ua  uahcg         Amount and/or Complexity of Data Reviewed  Clinical lab tests: ordered and reviewed  Review and summarize past medical records: yes      CritCare Time    Disposition  Final diagnoses:   Hand edema - right   Facial edema - edema of left forehead and left cheek     Time reflects when diagnosis was documented in both MDM as applicable and the Disposition within this note     Time User Action Codes Description Comment    9/12/2018  4:55 AM Michaelene Blonder Add [R60 0] Hand edema     9/12/2018  4:56 AM Michaelene Blonder Modify [R60 0] Hand edema right    9/12/2018  4:56 AM Michaelene Blonder Add [R60 0] Facial edema     9/12/2018  4:56 AM Michaelene Blonder Modify [R60 0] Facial edema edema of left forehead and left cheek      ED Disposition     ED Disposition Condition Comment    Discharge  [de-identified] M Leslie discharge to home/self care      Condition at discharge: Good        Follow-up Information     Follow up With Specialties Details Why Contact Info Additional Information    Roger Courtney PA-C Family Medicine, Physician Assistant Schedule an appointment as soon as possible for a visit in 2 days  [address]  [phone]       [address] Rheumatology Schedule an appointment as soon as possible for a visit in 2 days  [address] [id number]  [phone]     [address] [facility] Emergency Department Emergency Medicine  If symptoms worsen Gerry Vance [address] [facility] ED, [address] [facility] , [place], [place], [address]          Discharge Medication List as of 9/12/2018  5:57 AM      START taking these medications    Details   predniSONE 10 mg tablet Take 5 tabs po x 2 days; 4 tabs po x 2 days; 3 tabs po x 2 days; 2 tabs po x 2 days; 1 tab po x 2 days  , Print         CONTINUE these medications which have NOT CHANGED    Details   albuterol (PROVENTIL HFA,VENTOLIN HFA) 90 mcg/act inhaler Inhale 2 puffs every 6 (six) hours as needed for wheezing, Until Discontinued, Historical Med      !! Albuterol Sulfate (PROAIR RESPICLICK) 468 (90 Base) MCG/ACT AEPB 2 inhalations every 4-6 hours as needed for shortness of breath or wheezing, Normal      EPINEPHrine 1 MG/ML KIT Inject 1 kit as directed as needed, Until Discontinued, Historical Med      Methylprednisolone 4 MG TBPK Use as directed on package, Print      !! PROAIR RESPICLICK 563 (90 Base) MCG/ACT AEPB INHALE 2 PUFFS EVERY 4-6 HOURS IF NEEDED, Normal       !! - Potential duplicate medications found  Please discuss with provider  No discharge procedures on file      ED Provider  Electronically Signed by           Bob Ferguson  09/12/18 6447

## 2018-09-12 NOTE — DISCHARGE INSTRUCTIONS
You have been prescribed prednisone for the swelling - take as prescribed  Your cause is unknown at this time  Follow up with your PCP and Rheumatologist    Edema   WHAT YOU NEED TO KNOW:   Edema is swelling throughout your body  Edema is usually a sign that you are retaining fluid  The swelling may be caused by heart failure or kidney, thyroid, or liver disease  It may also be caused by medicines such as antidepressants, blood pressure medicines, or hormones  Sudden swelling around the lips or face may be a sign of a severe allergic reaction  Swelling of an arm or leg may be caused by blockage of your veins  DISCHARGE INSTRUCTIONS:   Return to the emergency department if:   · You have shortness of breath at rest, especially when you lie down  · You cough up pink, foamy sputum  · You have chest pain  · Your heartbeat is fast or uneven  Contact your healthcare provider if:   · The swollen area feels cold and is pale or blue in color  · The swollen area feels warm, painful, and is red in color  · You have increased swelling or swelling in other parts of your body  · You have questions or concerns about your condition or care  Medicines:   · Medicines  help to get rid of extra body fluid  · Take your medicine as directed  Contact your healthcare provider if you think your medicine is not helping or if you have side effects  Tell him or her if you are allergic to any medicine  Keep a list of the medicines, vitamins, and herbs you take  Include the amounts, and when and why you take them  Bring the list or the pill bottles to follow-up visits  Carry your medicine list with you in case of an emergency  Follow up with your healthcare provider as directed:  Write down your questions so you remember to ask them during your visits  Manage edema:   · Elevate  your arms or legs as directed  Raise them above the level of your heart as often as you can  This will help decrease swelling and pain  Prop them on pillows or blankets to keep them elevated comfortably  · Wear pressure stockings as directed  The stockings are tight and put pressure on your legs  This helps to keep fluid from collecting in your legs or ankles  · Limit your salt intake  Salt causes your body to hold water  Ask about any other changes to your diet  · Stay active  Do not stand or sit for long periods of time  Ask your healthcare provider about the best exercise plan for you  · Keep your skin moist  using lotion, cream, or ointment  Ask your healthcare provider what to use and how often to use it  © 2017 [employer]  Information is for End User's use only and may not be sold, redistributed or otherwise used for commercial purposes  All illustrations and images included in CareNotes® are the copyrighted property of A D A M [employer] Inc  or Bhupendra Shafer  The above information is an  only  It is not intended as medical advice for individual conditions or treatments  Talk to your doctor, nurse or pharmacist before following any medical regimen to see if it is safe and effective for you

## 2018-09-14 LAB — RYE IGE QN: NEGATIVE

## 2018-10-02 ENCOUNTER — OFFICE VISIT (OUTPATIENT)
Dept: FAMILY MEDICINE CLINIC | Facility: CLINIC | Age: 23
End: 2018-10-02
Payer: COMMERCIAL

## 2018-10-02 VITALS
HEIGHT: 61 IN | RESPIRATION RATE: 16 BRPM | HEART RATE: 82 BPM | SYSTOLIC BLOOD PRESSURE: 102 MMHG | OXYGEN SATURATION: 100 % | TEMPERATURE: 96.2 F | BODY MASS INDEX: 24.55 KG/M2 | DIASTOLIC BLOOD PRESSURE: 64 MMHG | WEIGHT: 130 LBS

## 2018-10-02 DIAGNOSIS — N94.6 DYSMENORRHEA: Primary | ICD-10-CM

## 2018-10-02 DIAGNOSIS — J45.40 MODERATE PERSISTENT ASTHMA WITHOUT COMPLICATION: ICD-10-CM

## 2018-10-02 DIAGNOSIS — Z91.013 SHELLFISH ALLERGY: ICD-10-CM

## 2018-10-02 DIAGNOSIS — Z23 NEED FOR INFLUENZA VACCINATION: ICD-10-CM

## 2018-10-02 PROCEDURE — 99213 OFFICE O/P EST LOW 20 MIN: CPT | Performed by: PHYSICIAN ASSISTANT

## 2018-10-02 PROCEDURE — 90471 IMMUNIZATION ADMIN: CPT

## 2018-10-02 PROCEDURE — 3725F SCREEN DEPRESSION PERFORMED: CPT

## 2018-10-02 PROCEDURE — 90686 IIV4 VACC NO PRSV 0.5 ML IM: CPT

## 2018-10-02 PROCEDURE — 1036F TOBACCO NON-USER: CPT | Performed by: PHYSICIAN ASSISTANT

## 2018-10-02 RX ORDER — NAPROXEN 500 MG/1
1 TABLET ORAL 2 TIMES DAILY
COMMUNITY
Start: 2017-06-29 | End: 2018-10-02 | Stop reason: SDUPTHER

## 2018-10-02 RX ORDER — ALBUTEROL SULFATE 90 UG/1
2 AEROSOL, METERED RESPIRATORY (INHALATION) EVERY 6 HOURS PRN
Qty: 18 G | Refills: 1 | Status: SHIPPED | OUTPATIENT
Start: 2018-10-02 | End: 2018-12-12 | Stop reason: SDUPTHER

## 2018-10-02 RX ORDER — NAPROXEN 500 MG/1
500 TABLET ORAL 2 TIMES DAILY WITH MEALS
Qty: 60 TABLET | Refills: 3 | Status: SHIPPED | OUTPATIENT
Start: 2018-10-02 | End: 2019-03-13 | Stop reason: SDUPTHER

## 2018-10-02 RX ORDER — EPINEPHRINE 0.3 MG/.3ML
0.3 INJECTION SUBCUTANEOUS ONCE
Qty: 1 EACH | Refills: 0 | Status: SHIPPED | OUTPATIENT
Start: 2018-10-02 | End: 2019-06-21 | Stop reason: ALTCHOICE

## 2018-10-02 RX ORDER — EPINEPHRINE 0.3 MG/.3ML
INJECTION SUBCUTANEOUS
COMMUNITY
Start: 2017-06-29 | End: 2018-10-02 | Stop reason: SDUPTHER

## 2018-10-02 NOTE — PROGRESS NOTES
Assessment/Plan:    Dysmenorrhea  Currently controlled with naproxen  Not interested in hormone contraception at this time    Asthma, moderate persistent  Controlled  Using albuterol PRN only         Problem List Items Addressed This Visit        Respiratory    Asthma, moderate persistent     Controlled  Using albuterol PRN only         Relevant Medications    albuterol (PROVENTIL HFA,VENTOLIN HFA) 90 mcg/act inhaler       Genitourinary    Dysmenorrhea - Primary     Currently controlled with naproxen  Not interested in hormone contraception at this time         Relevant Medications    naproxen (NAPROSYN) 500 mg tablet       Other    Shellfish allergy    Relevant Medications    EPINEPHrine (EPIPEN) 0 3 mg/0 3 mL SOAJ            Subjective:      Patient ID: Mehnaz Conklin is a 26 y o  female  Asthma   There is no cough or wheezing  This is a chronic problem  The problem occurs rarely  The problem has been gradually improving  Pertinent negatives include no appetite change, chest pain, ear pain, headaches, myalgias or sore throat  Her symptoms are aggravated by change in weather  Her symptoms are alleviated by beta-agonist  She reports significant improvement on treatment  There are no known risk factors for lung disease  Her past medical history is significant for asthma  She also needs refills of naproxen for dysmenorrhea  Menses are regular but she does get strong cramping  She is sexually active now but not interested in contraception other than barrier method  The following portions of the patient's history were reviewed and updated as appropriate:   She  has a past medical history of Asthma  She   Patient Active Problem List    Diagnosis Date Noted    Asthma, moderate persistent 06/29/2017    Dysmenorrhea 06/29/2017    Adjustment disorder with depressed mood 06/01/2016    Shellfish allergy 08/07/2015     She  has a past surgical history that includes No past surgeries    Her family history is not on file  She  reports that she has never smoked  She has never used smokeless tobacco  She reports that she does not drink alcohol or use drugs  Current Outpatient Prescriptions   Medication Sig Dispense Refill    albuterol (PROVENTIL HFA,VENTOLIN HFA) 90 mcg/act inhaler Inhale 2 puffs every 6 (six) hours as needed for wheezing 18 g 1    EPINEPHrine (EPIPEN) 0 3 mg/0 3 mL SOAJ Inject 0 3 mL (0 3 mg total) into a muscle once for 1 dose 1 each 0    naproxen (NAPROSYN) 500 mg tablet Take 1 tablet (500 mg total) by mouth 2 (two) times a day with meals 60 tablet 3     No current facility-administered medications for this visit  Current Outpatient Prescriptions on File Prior to Visit   Medication Sig    [DISCONTINUED] albuterol (PROVENTIL HFA,VENTOLIN HFA) 90 mcg/act inhaler Inhale 2 puffs every 6 (six) hours as needed for wheezing    [DISCONTINUED] Albuterol Sulfate (PROAIR RESPICLICK) 770 (90 Base) MCG/ACT AEPB 2 inhalations every 4-6 hours as needed for shortness of breath or wheezing    [DISCONTINUED] PROAIR RESPICLICK 956 (90 Base) MCG/ACT AEPB INHALE 2 PUFFS EVERY 4-6 HOURS IF NEEDED    [DISCONTINUED] EPINEPHrine 1 MG/ML KIT Inject 1 kit as directed as needed    [DISCONTINUED] Methylprednisolone 4 MG TBPK Use as directed on package (Patient not taking: Reported on 10/2/2018 )    [DISCONTINUED] predniSONE 10 mg tablet Take 5 tabs po x 2 days; 4 tabs po x 2 days; 3 tabs po x 2 days; 2 tabs po x 2 days; 1 tab po x 2 days  (Patient not taking: Reported on 10/2/2018 )     No current facility-administered medications on file prior to visit  She is allergic to shellfish-derived products       Review of Systems   Constitutional: Negative for activity change, appetite change, chills, fatigue and unexpected weight change  HENT: Negative for dental problem, ear pain, hearing loss and sore throat  Eyes: Negative for visual disturbance  Respiratory: Negative for cough and wheezing  Cardiovascular: Negative for chest pain  Gastrointestinal: Negative for abdominal pain, constipation, diarrhea and vomiting  Genitourinary: Negative for difficulty urinating and dysuria  Musculoskeletal: Negative for arthralgias, back pain and myalgias  Skin: Negative for rash  Neurological: Negative for dizziness and headaches  Psychiatric/Behavioral: Negative for behavioral problems  Objective:      /64 (BP Location: Right arm, Patient Position: Sitting, Cuff Size: Standard)   Pulse 82   Temp (!) 96 2 °F (35 7 °C) (Tympanic)   Resp 16   Ht 5' 1" (1 549 m)   Wt 59 kg (130 lb)   LMP 09/19/2018   SpO2 100%   BMI 24 56 kg/m²          Physical Exam   Constitutional: She is oriented to person, place, and time  She appears well-developed and well-nourished  No distress  HENT:   Head: Normocephalic and atraumatic  Right Ear: External ear normal    Left Ear: External ear normal    Eyes: Conjunctivae are normal    Neck: Normal range of motion  Neck supple  No thyromegaly present  Cardiovascular: Normal rate, regular rhythm and normal heart sounds  No murmur heard  Pulmonary/Chest: Effort normal and breath sounds normal  No respiratory distress  She has no wheezes  Lymphadenopathy:     She has no cervical adenopathy  Neurological: She is alert and oriented to person, place, and time  Psychiatric: She has a normal mood and affect  Her behavior is normal    Nursing note and vitals reviewed

## 2018-12-12 DIAGNOSIS — J45.40 MODERATE PERSISTENT ASTHMA WITHOUT COMPLICATION: ICD-10-CM

## 2018-12-13 RX ORDER — ALBUTEROL SULFATE 90 UG/1
2 AEROSOL, METERED RESPIRATORY (INHALATION) EVERY 6 HOURS PRN
Qty: 18 G | Refills: 0 | Status: SHIPPED | OUTPATIENT
Start: 2018-12-13 | End: 2019-03-01 | Stop reason: SDUPTHER

## 2018-12-13 RX ORDER — ALBUTEROL SULFATE 90 UG/1
AEROSOL, METERED RESPIRATORY (INHALATION)
Qty: 18 INHALER | Refills: 1 | Status: SHIPPED | OUTPATIENT
Start: 2018-12-13 | End: 2019-02-24 | Stop reason: SDUPTHER

## 2019-02-24 DIAGNOSIS — J45.40 MODERATE PERSISTENT ASTHMA WITHOUT COMPLICATION: ICD-10-CM

## 2019-02-24 RX ORDER — ALBUTEROL SULFATE 90 UG/1
AEROSOL, METERED RESPIRATORY (INHALATION)
Qty: 18 INHALER | Refills: 1 | Status: SHIPPED | OUTPATIENT
Start: 2019-02-24 | End: 2019-06-21 | Stop reason: ALTCHOICE

## 2019-03-01 ENCOUNTER — HOSPITAL ENCOUNTER (EMERGENCY)
Facility: HOSPITAL | Age: 24
Discharge: HOME | End: 2019-03-01
Attending: EMERGENCY MEDICINE
Payer: COMMERCIAL

## 2019-03-01 VITALS
TEMPERATURE: 97.8 F | RESPIRATION RATE: 20 BRPM | OXYGEN SATURATION: 100 % | WEIGHT: 134.04 LBS | BODY MASS INDEX: 25.33 KG/M2 | HEART RATE: 79 BPM | DIASTOLIC BLOOD PRESSURE: 72 MMHG | SYSTOLIC BLOOD PRESSURE: 122 MMHG

## 2019-03-01 DIAGNOSIS — J45.40 MODERATE PERSISTENT ASTHMA WITHOUT COMPLICATION: ICD-10-CM

## 2019-03-01 DIAGNOSIS — J45.40 ASTHMA, MODERATE PERSISTENT: ICD-10-CM

## 2019-03-01 DIAGNOSIS — J45.901 ASTHMA EXACERBATION: Primary | ICD-10-CM

## 2019-03-01 PROCEDURE — 99283 EMERGENCY DEPT VISIT LOW MDM: CPT

## 2019-03-01 RX ORDER — ALBUTEROL SULFATE 90 UG/1
2 AEROSOL, METERED RESPIRATORY (INHALATION) EVERY 6 HOURS PRN
Qty: 18 G | Refills: 0 | Status: SHIPPED | OUTPATIENT
Start: 2019-03-01 | End: 2019-08-13 | Stop reason: SDUPTHER

## 2019-03-01 RX ORDER — PREDNISONE 20 MG/1
40 TABLET ORAL ONCE
Status: COMPLETED | OUTPATIENT
Start: 2019-03-01 | End: 2019-03-01

## 2019-03-01 RX ORDER — IPRATROPIUM BROMIDE AND ALBUTEROL SULFATE 2.5; .5 MG/3ML; MG/3ML
3 SOLUTION RESPIRATORY (INHALATION)
Status: DISCONTINUED | OUTPATIENT
Start: 2019-03-01 | End: 2019-03-01

## 2019-03-01 RX ORDER — IPRATROPIUM BROMIDE AND ALBUTEROL SULFATE 2.5; .5 MG/3ML; MG/3ML
3 SOLUTION RESPIRATORY (INHALATION) ONCE
Status: COMPLETED | OUTPATIENT
Start: 2019-03-01 | End: 2019-03-01

## 2019-03-01 RX ORDER — PREDNISONE 20 MG/1
40 TABLET ORAL DAILY
Qty: 15 TABLET | Refills: 0 | Status: SHIPPED | OUTPATIENT
Start: 2019-03-02 | End: 2019-03-06

## 2019-03-01 RX ADMIN — PREDNISONE 40 MG: 20 TABLET ORAL at 21:56

## 2019-03-01 RX ADMIN — IPRATROPIUM BROMIDE AND ALBUTEROL SULFATE 3 ML: 2.5; .5 SOLUTION RESPIRATORY (INHALATION) at 21:50

## 2019-03-02 NOTE — ED PROVIDER NOTES
History  Chief Complaint   Patient presents with    Asthma     pt  reports having a few asthmatic episodes wednesday  pt  reports "i feel like i am working to breath " pt  used inhaler prior to arrival with no improvement  Patient is a 27-year-old female with a history of asthma who presents with 3 days exacerbation of her typical asthma symptoms  Patient reports current exacerbations started on Wednesday  Asthma was exacerbated by weather change, passive smoke exposure  Patient noted wheezing and shortness of breath  She has had frequent use of her p r n  Albuterol inhaler  Associated with sharp, pleuritic chest pain  Similar in nature to prior asthma episodes  Patient reports last asthma exacerbation was approximately 3 months ago  She denies history of admission or intubation for asthma  She does not take daily medications for her asthma  Prior to Admission Medications   Prescriptions Last Dose Informant Patient Reported? Taking? EPINEPHrine (EPIPEN) 0 3 mg/0 3 mL SOAJ   No No   Sig: Inject 0 3 mL (0 3 mg total) into a muscle once for 1 dose   albuterol (PROVENTIL HFA,VENTOLIN HFA) 90 mcg/act inhaler   No No   Sig: Inhale 2 puffs every 6 (six) hours as needed for wheezing   albuterol (PROVENTIL HFA,VENTOLIN HFA) 90 mcg/act inhaler   No No   Sig: TAKE 2 PUFFS BY MOUTH EVERY 6 HOURS AS NEEDED FOR WHEEZE   albuterol (PROVENTIL HFA,VENTOLIN HFA) 90 mcg/act inhaler   No No   Sig: Inhale 2 puffs every 6 (six) hours as needed for wheezing   naproxen (NAPROSYN) 500 mg tablet   No No   Sig: Take 1 tablet (500 mg total) by mouth 2 (two) times a day with meals      Facility-Administered Medications: None       Past Medical History:   Diagnosis Date    Asthma        Past Surgical History:   Procedure Laterality Date    NO PAST SURGERIES         History reviewed  No pertinent family history  I have reviewed and agree with the history as documented      Social History     Tobacco Use    Smoking status: Never Smoker    Smokeless tobacco: Never Used   Substance Use Topics    Alcohol use: No    Drug use: No        Review of Systems   Constitutional: Negative for chills, fatigue and fever  HENT: Negative for congestion and sore throat  Eyes: Negative for visual disturbance  Respiratory: Positive for chest tightness, shortness of breath and wheezing  Negative for cough  Cardiovascular: Negative for chest pain  Gastrointestinal: Negative for abdominal pain, diarrhea, nausea and vomiting  Endocrine: Negative for polyuria  Genitourinary: Negative for difficulty urinating and dysuria  Musculoskeletal: Negative for arthralgias  Skin: Negative for rash  Neurological: Negative for dizziness, light-headedness and headaches  All other systems reviewed and are negative  Physical Exam  ED Triage Vitals [03/01/19 2133]   Temperature Pulse Respirations Blood Pressure SpO2   97 8 °F (36 6 °C) 79 20 122/72 100 %      Temp Source Heart Rate Source Patient Position - Orthostatic VS BP Location FiO2 (%)   Oral Monitor Sitting Right arm --      Pain Score       --           Orthostatic Vital Signs  Vitals:    03/01/19 2133   BP: 122/72   Pulse: 79   Patient Position - Orthostatic VS: Sitting       Physical Exam   Constitutional: She is oriented to person, place, and time  She appears well-developed and well-nourished  No distress  HENT:   Head: Normocephalic and atraumatic  Eyes: Pupils are equal, round, and reactive to light  EOM are normal    Neck: Normal range of motion  Neck supple  Cardiovascular: Normal rate, regular rhythm and normal heart sounds  Pulmonary/Chest: Effort normal and breath sounds normal  No respiratory distress  Abdominal: Soft  Bowel sounds are normal  There is no tenderness  Musculoskeletal: Normal range of motion  Neurological: She is alert and oriented to person, place, and time  Skin: Skin is warm and dry  Psychiatric: She has a normal mood and affect  Nursing note and vitals reviewed  ED Medications  Medications   ipratropium-albuterol (DUO-NEB) 0 5-2 5 mg/3 mL inhalation solution 3 mL (3 mL Nebulization Given 3/1/19 2150)   predniSONE tablet 40 mg (40 mg Oral Given 3/1/19 2156)       Diagnostic Studies  Results Reviewed     None                 No orders to display         Procedures  Procedures      Phone Consults  ED Phone Contact    ED Course                               MDM  Number of Diagnoses or Management Options  Asthma exacerbation:   Asthma, moderate persistent:   Diagnosis management comments: Patient is a 68-year-old female with a history of asthma who presents with 3 days exacerbation of her typical asthma symptoms  Exam is benign  Will treat symptomatically with DuoNeb, steroids  On reassessment, patient reports improvement in symptoms  Will discharge with inhaler refill, short steroid course, return precautions, instructions to follow up as soon as possible with PCP  Disposition  Final diagnoses:   Asthma exacerbation   Asthma, moderate persistent     Time reflects when diagnosis was documented in both MDM as applicable and the Disposition within this note     Time User Action Codes Description Comment    3/1/2019 10:43 PM Remedios Shaw Add [J45 901] Asthma exacerbation     3/1/2019 10:45 PM Pepper Grigsby Add [J45 40] Moderate persistent asthma without complication     2/1/5033 10:45 PM Dena Grigsby Modify [J45 40] Moderate persistent asthma without complication     2/6/4703 10:46 PM Pepper Grigsby Modify [J45 40] Moderate persistent asthma without complication     9/2/3683 10:46 PM Pepper Grigsby Add [J45 40] Asthma, moderate persistent     3/1/2019 10:46 PM Pepper Grigsby Modify [J45 40] Moderate persistent asthma without complication       ED Disposition     ED Disposition Condition Date/Time Comment    Discharge Good Fri Mar 1, 2019 10:43 PM Jacquie Nelson discharge to home/self care  Follow-up Information     Follow up With Specialties Details Why Contact Info Additional Information    Roger Courtney PA-C Family Medicine, Physician Assistant Call in 1 day  [address] [facility] [address] [facility] Emergency Department Emergency Medicine  As needed, If symptoms worsen [place] [id number]  [phone] [place] [facility], [address] [facility] [address]  , [place], [place], [address]          Discharge Medication List as of 3/1/2019 10:48 PM      START taking these medications    Details   predniSONE 20 mg tablet Take 2 tablets (40 mg total) by mouth daily for 4 days, Starting Sat 3/2/2019, Until Wed 3/6/2019, Print         CONTINUE these medications which have CHANGED    Details   !! albuterol (PROVENTIL HFA,VENTOLIN HFA) 90 mcg/act inhaler Inhale 2 puffs every 6 (six) hours as needed for wheezing, Starting Fri 3/1/2019, Print       !! - Potential duplicate medications found  Please discuss with provider  CONTINUE these medications which have NOT CHANGED    Details   !! albuterol (PROVENTIL HFA,VENTOLIN HFA) 90 mcg/act inhaler TAKE 2 PUFFS BY MOUTH EVERY 6 HOURS AS NEEDED FOR WHEEZE, Normal      EPINEPHrine (EPIPEN) 0 3 mg/0 3 mL SOAJ Inject 0 3 mL (0 3 mg total) into a muscle once for 1 dose, Starting Tue 10/2/2018, Normal      naproxen (NAPROSYN) 500 mg tablet Take 1 tablet (500 mg total) by mouth 2 (two) times a day with meals, Starting Tue 10/2/2018, Normal       !! - Potential duplicate medications found  Please discuss with provider  No discharge procedures on file  ED Provider  Attending physically available and evaluated Alejandrina Jacob  ESPERANZA managed the patient along with the ED Attending      Electronically Signed by         Sam Gonzalez MD  03/02/19 1940

## 2019-03-02 NOTE — ED ATTENDING ATTESTATION
Galilea Lee MD, saw and evaluated the patient  I have discussed the patient with the resident/non-physician practitioner and agree with the resident's/non-physician practitioner's findings, Plan of Care, and MDM as documented in the resident's/non-physician practitioner's note, except where noted  All available labs and Radiology studies were reviewed  I was present for key portions of any procedure(s) performed by the resident/non-physician practitioner and I was immediately available to provide assistance  At this point I agree with the current assessment done in the Emergency Department  I have conducted an independent evaluation of this patient a history and physical is as follows:    27 YO female presents with wheezing, using inhaler without alleviation  States weather changes are trigger  Having inspiratory chest pain  Pt denies F/C/N/V/D/C, no dysuria, burning on urination or blood in urine  Gen: Pt is in NAD  HEENT: Head is atraumatic, EOM's intact, neck has FROM  Chest: CTAB, non-tender  Heart: RRR  Abdomen: Soft, NT/ND  Musculoskeletal: FROM in all extremities  Skin: No rash, no ecchymosis  Neuro: Awake, alert, oriented x4; Cranial nerves II-XII intact  Psych: Normal affect    MDM - Pt with Hx of asthma, states symptoms similar to previous  Appears well  Will give breathing Tx, steroids, reassess          Critical Care Time  Procedures

## 2019-03-13 DIAGNOSIS — N94.6 DYSMENORRHEA: ICD-10-CM

## 2019-03-15 RX ORDER — NAPROXEN 500 MG/1
TABLET ORAL
Qty: 60 TABLET | Refills: 3 | Status: SHIPPED | OUTPATIENT
Start: 2019-03-15 | End: 2019-06-21 | Stop reason: ALTCHOICE

## 2019-04-13 VITALS
TEMPERATURE: 98 F | DIASTOLIC BLOOD PRESSURE: 76 MMHG | SYSTOLIC BLOOD PRESSURE: 164 MMHG | WEIGHT: 134.26 LBS | RESPIRATION RATE: 16 BRPM | HEART RATE: 80 BPM | BODY MASS INDEX: 25.37 KG/M2 | OXYGEN SATURATION: 100 %

## 2019-04-13 PROCEDURE — 99284 EMERGENCY DEPT VISIT MOD MDM: CPT

## 2019-04-14 ENCOUNTER — HOSPITAL ENCOUNTER (EMERGENCY)
Facility: HOSPITAL | Age: 24
Discharge: HOME | End: 2019-04-14
Attending: EMERGENCY MEDICINE | Admitting: EMERGENCY MEDICINE
Payer: COMMERCIAL

## 2019-04-14 ENCOUNTER — APPOINTMENT (EMERGENCY)
Dept: RADIOLOGY | Facility: HOSPITAL | Age: 24
End: 2019-04-14
Payer: COMMERCIAL

## 2019-04-14 DIAGNOSIS — G44.209 TENSION HEADACHE: ICD-10-CM

## 2019-04-14 DIAGNOSIS — S29.012A STRAIN OF THORACIC SPINE: ICD-10-CM

## 2019-04-14 DIAGNOSIS — S16.1XXA CERVICAL STRAIN: Primary | ICD-10-CM

## 2019-04-14 PROCEDURE — 72040 X-RAY EXAM NECK SPINE 2-3 VW: CPT

## 2019-04-14 PROCEDURE — 99284 EMERGENCY DEPT VISIT MOD MDM: CPT | Performed by: EMERGENCY MEDICINE

## 2019-04-14 RX ORDER — ACETAMINOPHEN 325 MG/1
650 TABLET ORAL ONCE
Status: COMPLETED | OUTPATIENT
Start: 2019-04-14 | End: 2019-04-14

## 2019-04-14 RX ORDER — LIDOCAINE 50 MG/G
1 PATCH TOPICAL ONCE
Status: DISCONTINUED | OUTPATIENT
Start: 2019-04-14 | End: 2019-04-14 | Stop reason: HOSPADM

## 2019-04-14 RX ORDER — IBUPROFEN 400 MG/1
400 TABLET ORAL ONCE
Status: COMPLETED | OUTPATIENT
Start: 2019-04-14 | End: 2019-04-14

## 2019-04-14 RX ORDER — NAPROXEN 500 MG/1
500 TABLET ORAL 2 TIMES DAILY WITH MEALS
Qty: 10 TABLET | Refills: 0 | Status: SHIPPED | OUTPATIENT
Start: 2019-04-14 | End: 2019-06-21 | Stop reason: ALTCHOICE

## 2019-04-14 RX ADMIN — LIDOCAINE 1 PATCH: 50 PATCH TOPICAL at 00:43

## 2019-04-14 RX ADMIN — IBUPROFEN 400 MG: 400 TABLET ORAL at 00:41

## 2019-04-14 RX ADMIN — ACETAMINOPHEN 650 MG: 325 TABLET ORAL at 00:41

## 2019-06-21 ENCOUNTER — OFFICE VISIT (OUTPATIENT)
Dept: OBGYN CLINIC | Facility: CLINIC | Age: 24
End: 2019-06-21

## 2019-06-21 VITALS
DIASTOLIC BLOOD PRESSURE: 70 MMHG | HEIGHT: 61 IN | SYSTOLIC BLOOD PRESSURE: 130 MMHG | BODY MASS INDEX: 26.32 KG/M2 | WEIGHT: 139.4 LBS

## 2019-06-21 DIAGNOSIS — N92.6 MISSED PERIOD: Primary | ICD-10-CM

## 2019-06-21 DIAGNOSIS — Z32.01 POSITIVE PREGNANCY TEST: ICD-10-CM

## 2019-06-21 LAB — SL AMB POCT URINE HCG: POSITIVE

## 2019-06-21 PROCEDURE — 99203 OFFICE O/P NEW LOW 30 MIN: CPT | Performed by: NURSE PRACTITIONER

## 2019-06-21 PROCEDURE — 81025 URINE PREGNANCY TEST: CPT | Performed by: NURSE PRACTITIONER

## 2019-06-21 RX ORDER — PNV NO.95/FERROUS FUM/FOLIC AC 28MG-0.8MG
1 TABLET ORAL DAILY
Qty: 90 TABLET | Refills: 4 | Status: SHIPPED | OUTPATIENT
Start: 2019-06-21 | End: 2021-12-08

## 2019-07-01 ENCOUNTER — ULTRASOUND (OUTPATIENT)
Dept: PERINATAL CARE | Facility: OTHER | Age: 24
End: 2019-07-01
Payer: COMMERCIAL

## 2019-07-01 VITALS
WEIGHT: 140.4 LBS | BODY MASS INDEX: 26.51 KG/M2 | DIASTOLIC BLOOD PRESSURE: 68 MMHG | HEIGHT: 61 IN | HEART RATE: 84 BPM | SYSTOLIC BLOOD PRESSURE: 101 MMHG

## 2019-07-01 DIAGNOSIS — Z3A.09 9 WEEKS GESTATION OF PREGNANCY: ICD-10-CM

## 2019-07-01 DIAGNOSIS — Z32.01 POSITIVE PREGNANCY TEST: ICD-10-CM

## 2019-07-01 DIAGNOSIS — Z36.89 ENCOUNTER TO ESTABLISH GESTATIONAL AGE USING ULTRASOUND: Primary | ICD-10-CM

## 2019-07-01 PROCEDURE — 76801 OB US < 14 WKS SINGLE FETUS: CPT | Performed by: OBSTETRICS & GYNECOLOGY

## 2019-07-01 PROCEDURE — 99201 PR OFFICE OUTPATIENT NEW 10 MINUTES: CPT | Performed by: OBSTETRICS & GYNECOLOGY

## 2019-07-01 NOTE — PROGRESS NOTES
Please refer to the [facility] ultrasound report in Ob Procedures for additional information regarding the visit to the [facility]  today

## 2019-07-01 NOTE — LETTER
July 2, 2019     [facility] [facility] PROVIDER    Patient: Elizabet Bustos   YOB: 1995   Date of Visit: 7/1/2019       Dear   Provider: Thank you for referring Elizabet Bustos to me for evaluation  Below are my notes for this consultation  If you have questions, please do not hesitate to call me  I look forward to following your patient along with you  Sincerely,        Ryan Gaytan MD        CC: No Recipients  Ryna Gaytan MD  7/1/2019  6:53 PM  Sign at close encounter  Please refer to the [facility] ultrasound report in Ob Procedures for additional information regarding the visit to the [facility]  today

## 2019-07-03 PROBLEM — J45.40 ASTHMA, MODERATE PERSISTENT: Status: RESOLVED | Noted: 2017-06-29 | Resolved: 2019-07-03

## 2019-07-03 PROBLEM — N94.6 DYSMENORRHEA: Status: RESOLVED | Noted: 2017-06-29 | Resolved: 2019-07-03

## 2019-07-03 PROBLEM — Z36.9 ENCOUNTER FOR FETAL ULTRASOUND: Status: ACTIVE | Noted: 2019-07-03

## 2019-07-03 PROBLEM — Z13.79 GENETIC SCREENING: Status: ACTIVE | Noted: 2019-07-03

## 2019-07-19 ENCOUNTER — INITIAL PRENATAL (OUTPATIENT)
Dept: OBGYN CLINIC | Facility: CLINIC | Age: 24
End: 2019-07-19

## 2019-07-19 ENCOUNTER — ROUTINE PRENATAL (OUTPATIENT)
Dept: OBGYN CLINIC | Facility: CLINIC | Age: 24
End: 2019-07-19

## 2019-07-19 VITALS
SYSTOLIC BLOOD PRESSURE: 109 MMHG | BODY MASS INDEX: 25.62 KG/M2 | WEIGHT: 139.2 LBS | HEIGHT: 62 IN | DIASTOLIC BLOOD PRESSURE: 69 MMHG | HEART RATE: 83 BPM

## 2019-07-19 DIAGNOSIS — Z3A.13 13 WEEKS GESTATION OF PREGNANCY: Primary | ICD-10-CM

## 2019-07-19 DIAGNOSIS — B37.9 YEAST INFECTION: Primary | ICD-10-CM

## 2019-07-19 DIAGNOSIS — Z34.90 EARLY STAGE OF PREGNANCY: ICD-10-CM

## 2019-07-19 PROCEDURE — 99213 OFFICE O/P EST LOW 20 MIN: CPT | Performed by: NURSE PRACTITIONER

## 2019-07-19 PROCEDURE — T1001 NURSING ASSESSMENT/EVALUATN: HCPCS

## 2019-07-19 NOTE — PATIENT INSTRUCTIONS
Warning signs during pregnancy      When to Call    1  Vaginal bleeding  2  Sharp abdominal pain that does not go away  3  Fever (more than 100 4 and is not relieved by Tylenol)  4  Persistent vomiting lasting greater than 24 hours  5  Chest pain   6  Pain or burning when you urinate  7  Severe headache that doesn't resolve with Tylenol  8  Blurred vision or seeing spots in your vision  9  Sudden swelling of your face or hands  10  Redness, swelling or pain in a leg  11  A sudden weight gain in just a few days  12  Decrease in your baby's movement (after 28 weeks or the 6th month of pregnancy)  13  A loss of watery fluid from your vagina - can be a gush, a trickle or continuous wetness  14  After 20 weeks of pregnancy, rhythmic cramping (greater than 4 per hour) or menstrual like low/pelvic pain          Medications and Pregnancy: The following list of over-the-counter medications is usually considered safe to take during pregnancy  Take care to not double up on products containing acetaminophen (Tylenol)  Colds/Sore Throat   Robitussin DM - Plain (guaifenesin)   Saline nasal spray   Warm salt water gargle   Cepacol throat lozenges or mouthwash (cetylpyridinium)   Sucrets (hexylresoricinol)    Allergy  AVOID the D - or DECONGESTANT   Claritin (loratadine)   Zrytec (cetirizine)   Allerga (fexofenadine)   Headache/ Aches and Pains    Headaches / Aches and Pains:   Tylenol (acetaminophen)  Do NOT exceed more than 3000 mg of Tylenol in a 24-hour period      Heartburn   Mylanta (aluminum hydroxide/simethicone, magnesium hydroxide)   Maalaox (aluminum magnesium hydroxide, magnesium hydroxide)   Tums (calcium carbonate)   Riopan (magaldrate)    Constipation   Colace (docusate sodium)   Surfak (docusate sodium)   MiraLAX   Glycerin suppositories   Fleets enema (sodium phosphate & sodium biphosphate)    Nausea/Vomiting   Vitamin B6 (pyridoxine) - May take 50 mg at bedtime, 25 mg in the morning, 25 mg in the afternoon   Unisom (doxylamine) - May use for nausea/vomiting - (cut a 25 mg tablet in half)  May cause drowsiness  Sleep   Benadryl (diphenhydramine) - Take 1-2 tablets as needed at bedtime   Unisom (doxylamine) 25 mg tablet - As needed at bedtime   Melatonin 5 mg tablet - As needed at bedtime    Generally the generic form of medicine is usually lower priced than the brand name form of the medicine  Pregnancy   AMBULATORY CARE:   What you need to know about pregnancy:  A normal pregnancy lasts about 40 weeks  The first trimester lasts from your last period through the 12th week of pregnancy  The second trimester lasts from the 13th week of your pregnancy through the 23rd week  The third trimester lasts from your 24th week of pregnancy until your baby is born  If you know the date of your last period, your healthcare provider can estimate your due date  You may give birth to your baby any time from 37 weeks to 2 weeks after your due date  Seek care immediately if:   · You develop a severe headache that does not go away  · You have new or increased vision changes, such as blurred or spotted vision  · You have new or increased swelling in your face or hands  · You have pain or cramping in your abdomen or low back  · You have vaginal bleeding  Contact your healthcare provider or obstetrician if:   · You have abdominal cramps, pressure, or tightening  · You have a change in vaginal discharge  · You cannot keep food or drinks down, and you are losing weight  · You have chills or a fever  · You have vaginal itching, burning, or pain  · You have yellow, green, white, or foul-smelling vaginal discharge  · You have pain or burning when you urinate, less urine than usual, or pink or bloody urine  · You have questions or concerns about your condition or care    Body changes that may occur during your pregnancy:   · Breast changes  you will experience include tenderness and tingling during the early part of your pregnancy  Your breasts will become larger  You may need to use a support bra  You may see a thin, yellow fluid, called colostrum, leak from your nipples during the second trimester  Colostrum is a liquid that changes to milk about 3 days after you give birth  · Skin changes and stretch marks  may occur during your pregnancy  You may have red marks, called stretch marks, on your skin  Stretch marks will usually fade after pregnancy  Use lotion if your skin is dry and itchy  The skin on your face, around your nipples, and below your belly button may darken  Most of the time, your skin will return to its normal color after your baby is born  · Morning sickness  is nausea and vomiting that can happen at any time of day  Avoid fatty and spicy foods  Eat small meals throughout the day instead of large meals  [place]odi may help to decrease nausea  Ask your healthcare provider about other ways of decreasing nausea and vomiting  · Heartburn  may be caused by changes in your hormones during pregnancy  Your growing uterus may also push your stomach upward and force stomach acid to back up into your esophagus  Eat 4 or 5 small meals each day instead of large meals  Avoid spicy foods  Avoid eating right before bedtime  · Constipation  may develop during your pregnancy  To treat constipation, eat foods high in fiber such as fiber cereals, beans, fruits, vegetables, whole-grain breads, and prune juice  Get regular exercise and drink plenty of water  Your healthcare provider may also suggest a fiber supplement to soften your bowel movements  Talk to your healthcare provider before you use any medicines to decrease constipation  · Hemorrhoids  are enlarged veins in the rectal area  They may cause pain, itching, and bright red bleeding from your rectum  To decrease your risk of hemorrhoids, prevent constipation and do not strain to have a bowel movement   If you have hemorrhoids, soak in a tub of warm water to ease discomfort  Ask your healthcare provider how you can treat hemorrhoids  · Leg cramps and swelling  may be caused by low calcium levels or the added weight of pregnancy  Raise your legs above the level of your heart to decrease swelling  During a leg cramp, stretch or massage the muscle that has the cramp  Heat may help decrease pain and muscle spasms  Apply heat on your muscle for 20 to 30 minutes every 2 hours for as many days as directed  · Back pain  may occur as your baby grows  Do not stand for long periods of time or lift heavy items  Use good posture while you stand, squat, or bend  Wear low-heeled shoes with good support  Rest may also help to relieve back pain  Ask your healthcare provider about exercises you can do to strengthen your back muscles  Stay healthy during your pregnancy:   · Eat a variety of healthy foods  Healthy foods include fruits, vegetables, whole-grain breads, low-fat dairy foods, beans, lean meats, and fish  Drink liquids as directed  Ask how much liquid to drink each day and which liquids are best for you  Limit caffeine to less than 200 milligrams each day  Limit your intake of fish to 2 servings each week  Choose fish low in mercury such as canned light tuna, shrimp, crab, salmon, cod, or tilapia  Do not  eat fish high in mercury such as swordfish, tilefish, blu mackerel, and shark  · Take prenatal vitamins as directed  Your need for certain vitamins and minerals, such as folic acid, increases during pregnancy  Prenatal vitamins provide some of the extra vitamins and minerals you need  Prenatal vitamins may also help to decrease the risk of certain birth defects  · Ask how much weight you should gain during your pregnancy  Too much or too little weight gain can be unhealthy for you and your baby  · Talk to your healthcare provider about exercise  Moderate exercise can help you stay fit   Your healthcare provider will help you plan an exercise program that is safe for you during pregnancy  · Do not smoke  If you smoke, it is never too late to quit  Smoking increases your risk of a miscarriage and other health problems during your pregnancy  Smoking can cause your baby to be born too early or weigh less at birth  Ask your healthcare provider for information if you need help quitting  · Do not drink alcohol  Alcohol passes from your body to your baby through the placenta  It can affect your baby's brain development and cause fetal alcohol syndrome (FAS)  FAS is a group of conditions that causes mental, behavior, and growth problems  · Talk to your healthcare provider before you take any medicines  Many medicines may harm your baby if you take them when you are pregnant  Do not take any medicines, vitamins, herbs, or supplements without first talking to your healthcare provider  Never use illegal or street drugs (such as marijuana or cocaine) while you are pregnant  Safety tips:   · Avoid hot tubs and saunas  Do not use a hot tub or sauna while you are pregnant, especially during your first trimester  Hot tubs and saunas may raise your baby's temperature and increase the risk of birth defects  · Avoid toxoplasmosis  This is an infection caused by eating raw meat or being around infected cat feces  It can cause birth defects, miscarriages, and other problems  Wash your hands after you touch raw meat  Make sure any meat is well-cooked before you eat it  Avoid raw eggs and unpasteurized milk  Use gloves or ask someone else to clean your cat's litter box while you are pregnant  · Ask your healthcare provider about travel  The most comfortable time to travel is during the second trimester  Ask your healthcare provider if you can travel after 36 weeks  You may not be able to travel in an airplane after 36 weeks  He may also recommend that you avoid long road trips    Follow up with your healthcare provider or obstetrician as directed:  Go to all of your prenatal visits during your pregnancy  Write down your questions so you remember to ask them during your visits  © 2017 [employer]  Information is for End User's use only and may not be sold, redistributed or otherwise used for commercial purposes  All illustrations and images included in CareNotes® are the copyrighted property of [employer]  or Bhupendra Shafer  The above information is an  only  It is not intended as medical advice for individual conditions or treatments  Talk to your doctor, nurse or pharmacist before following any medical regimen to see if it is safe and effective for you  Pregnancy Diet   WHAT YOU NEED TO KNOW:   What is a healthy diet during pregnancy? A healthy diet during pregnancy is a meal plan that provides the amount of calories and nutrients you need during pregnancy  Your body needs extra calories and nutrients to support your growing baby  You need to gain the right amount of weight for a healthy baby and pregnancy  Babies born at a healthy weight have a lower risk of certain health problems at birth and later in life  A healthy diet may help you avoid gaining too much weight  Too much weight gain may cause problems for you during your pregnancy and delivery  What should I avoid while I am pregnant? · Alcohol:  Do not drink alcohol during pregnancy  Alcohol can increase your risk of a miscarriage (losing your baby)  Your baby may also be born too small and have other health problems, such as learning problems later in life  · Caffeine: It is not clear how caffeine affects pregnancy  Limit your intake of caffeine to avoid possible health problems  Caffeine may be found in coffee, tea, cola, sports drinks, and chocolate  · Foods that contain mercury:  Mercury is naturally found in almost all types of fish and shellfish   Some types of fish absorb higher levels of mercury that can be harmful to an unborn baby  Eat only fish and shellfish that are low in mercury  Each week, you may eat up to 12 ounces of fish or shellfish that have low levels of mercury  These include shrimp, canned light tuna, salmon, pollock, and catfish  Eat only 6 ounces of albacore (white) tuna per week  Albacore tuna has more mercury than canned tuna  Do not eat shark, swordfish, blu mackerel, or tilefish  · Raw and undercooked foods: You should not eat undercooked or raw meat, poultry, eggs, fish, or shellfish (shrimp, crab, lobster)  Cook leftover foods and ready-to-eat foods such as hot dogs until they are steaming hot  · Unpasteurized food:  Unpasteurized foods are foods that have not gone through the heating process (pasteurization) that destroys bacteria  You should not drink milk, juice, or cheese that has not been pasteurized  This includes Brie, feta, Camembert, blue, and [place] cheeses  Which foods can I eat while I am pregnant? Eat a variety of foods from each of the food groups listed below  Your dietitian will tell you how many servings you should have from each food group each day to get enough calories  The amount of calories you need depends on your daily activity, your weight before pregnancy, and current weight gain  Healthcare providers divide pregnancy into 3 periods of time called trimesters  In the first trimester, you usually do not need extra calories  In the second and third trimesters, most women should eat about 300 extra calories each day  · Fruits and vegetables:  Half of your plate should contain fruits and vegetables  ¨ Fruits:  Choose fresh, canned, or dried fruit as often as possible  ¨ 1 cup of sliced, diced, cooked, or canned fruit (canned in light syrup or 100% juice)    ¨ 1 large peach, orange, or banana    ¨ ½ cup of dried fruit    ¨ 1 cup of fruit juice    ¨ Vegetables:  Eat more dark green, red, and orange vegetables   Dark green vegetables include broccoli, spinach, allison lettuce, and radha greens  Examples of orange and red vegetables are carrots, sweet potatoes, winter squash, oranges, and red peppers  ¨ 1 cup of cooked or raw vegetables    ¨ 1 cup of vegetable juice    ¨ 2 cups of raw leafy greens    · Grains:  Half of the grains you eat each day should be whole grains  ¨ Whole grains:      ¨ ½ cup of cooked brown rice or cooked oatmeal    ¨ 1 cup (1 ounce) of whole-grain dry cereal    ¨ 1 slice of 917% whole-wheat or rye bread    ¨ 3 cups of popped popcorn    ¨ Other grains:      ¨ ½ cup of cooked white rice or pasta    ¨ ½ of an English muffin    ¨ 1 small flour or corn tortilla    ¨ 1 mini-bagel    · Dairy foods:  Choose fat-free or low-fat dairy foods:    ¨ 1½ ounces of hard cheese (mozzarella, Swiss, cheddar)     ¨ 1 cup (8 ounces) of low-fat or fat-free milk or yogurt    ¨ 1 cup of low-fat frozen yogurt or pudding    · Meat and other protein sources:  Choose lean meats and poultry  Bake, broil, and grill meat instead of frying it  Include a variety of seafood in place of some meat and poultry each week  Eat a variety of protein foods:    ¨ ½ ounce of nuts (12 almonds, 24 pistachios, 7 walnut halves) or 1 tablespoon of peanut butter (1 ounce)    ¨ ¼ cup of soy tofu or tempeh (1 ounce)    ¨ 1 egg    ¨ ¼ cup of cooked dried beans, peas, or lentils (1 ounce)    ¨ 1 small chicken breast or 1 small trout (about 3 ounces)    ¨ 1 salmon steak (4 to 6 ounces)    ¨ 1 small lean hamburger (2 to 3 ounces)    · Fats:  Limit saturated fats, trans fats, and cholesterol  These unhealthy fats are found in shortening, butter, stick margarine, and animal fat  Choose healthy fats such as polyunsaturated and monounsaturated fats:     ¨ 1 tablespoon of canola, olive, corn, sunflower, or soybean oil    ¨ 1 tablespoon of soft margarine    ¨ 1 teaspoon of mayonnaise    ¨ 2 tablespoons of salad dressing    ¨ ½ of an avocado  What vitamin and mineral supplements may I need? Your healthcare provider will tell you if you need a supplement and the type you should take  Talk to your healthcare provider before you take any other kind of supplement, including herbal (natural) supplements  · Prenatal vitamins:  Eat a variety of healthy foods, even if you take a prenatal vitamin  If you forget to take your vitamin, do not take double the amount the next day  · Folic acid:  You need at least 441 mcg of folic acid each day before you get pregnant  Folic acid helps to form your baby's brain and spinal cord in early pregnancy  During pregnancy, your daily need for folic acid increases to about 600 mcg  Get folic acid each day by eating citrus fruits and juices, green leafy vegetables, liver, or dried beans  Folic acid is also added to foods such as breakfast cereals, bread products, flour, and pasta  · Iron:  Iron is a mineral the body needs to make hemoglobin, which is a part of red blood cells  Hemoglobin helps your blood carry oxygen from the lungs to the rest of your body  Foods that are good sources of iron are meat, poultry, fish, beans, spinach, and fortified cereals and breads  Your body will absorb iron better from non-meat sources if you have a source of vitamin C at the same time  Drink tea and coffee separately from iron-fortified foods and iron supplements  You need about 30 mg of iron each day during pregnancy  · Calcium and vitamin D:  Women who do not eat dairy products may need a calcium and vitamin D supplement  Talk to your healthcare provider about calcium supplements if you do not regularly eat good sources of calcium  The amount of calcium you need is about 1,300 mg if you are between 15and 25years old and 1,000 mg if you are 23to 48years old  What diet changes may help if I have morning sickness? Morning sickness is common during the first few months of pregnancy  You may feel nauseated, and you may vomit several times each day   To improve symptoms of morning sickness, eat small, frequent meals instead of 3 large meals  Foods high in carbohydrate, such as crackers, dry toast, and pasta, may be easier for you to eat  Drink liquids between meals rather than with meals  What diet changes may decrease constipation? A high-fiber diet can improve the symptoms of constipation  Whole-grain breakfast cereals, whole-grain breads, and prune juice are high in fiber  Raw fruits and vegetables, and cooked beans are also good sources of fiber  It may also be helpful to increase your intake of fluids and get regular physical activity  Talk with your healthcare provider before you begin any exercise program    What diet changes may decrease heartburn? To improve the symptoms of heartburn, do not lie down right after you eat  When you do lie down, sleep with your head slightly elevated  Eat small, frequent meals instead of 3 large meals  It may also be helpful to avoid caffeine, chocolate, and spicy foods  How can I get enough calcium if I cannot tolerate dairy foods? If you cannot drink milk or eat dairy foods, try lactose-free or lactose-reduced milk or calcium-fortified soy milk  Ask your healthcare provider about pills you can take to help you digest milk products  Eat other drinks and foods that are fortified with calcium, such as orange juice  What other healthy guidelines should I follow? · Vegetarians and vegans: If you are a vegetarian or vegan, get enough protein, vitamin B12, and iron during your pregnancy  Some non-meat sources of these nutrients are fortified cereals, nut butters, soy products (tofu and soymilk), nuts, grains, and legumes  These nutrients are also found in eggs and milk products  · Cravings: You may have cravings for certain foods during your pregnancy  Foods that are high in calories, fat, and sugar should not replace healthy food choices   Some women have cravings for unusual substances such as scout, dirt, laundry starch, ice, and edmundo  This condition is called pica  These may lead to health problems such as anemia and cause other health problems  When should I contact my healthcare provider? · You are losing weight without trying  · You have cravings for substances such as scout, dirt, laundry starch, or ice  · You have questions or concerns about your condition or care  CARE AGREEMENT:   You have the right to help plan your care  Discuss treatment options with your caregivers to decide what care you want to receive  You always have the right to refuse treatment  The above information is an  only  It is not intended as medical advice for individual conditions or treatments  Talk to your doctor, nurse or pharmacist before following any medical regimen to see if it is safe and effective for you  © 2017 [employer] Information is for End User's use only and may not be sold, redistributed or otherwise used for commercial purposes  All illustrations and images included in CareNotes® are the copyrighted property of [employer]  or Bhupendra Shafer  Nausea and Vomiting in Pregnancy   AMBULATORY CARE:   Nausea and vomiting in pregnancy  can happen any time of day  These symptoms usually start before the 9th week of pregnancy, and end by the 14th week (second trimester)  Some women can have nausea and vomiting for a longer time  These symptoms can affect some women throughout the entire pregnancy  Nausea and vomiting do not harm your baby  These symptoms can make it hard for you to do your daily activities  Seek care immediately if:   · You have signs of dehydration  Examples are dark yellow urine, dry mouth and lips, dry skin, fast heartbeat, and urinating less than usual     · You have severe abdominal pain  · You feel too weak or dizzy to stand up  · You see blood in your vomit or bowel movements    Contact your healthcare provider if:   · You vomit more than 4 times in 1 day     · You have not been able to keep liquids down for more than 1 day  · You lose more than 2 pounds  · You have a fever  · Your nausea and vomiting continue longer than 14 weeks  · You have questions or concerns about your condition or care  Treatment  for nausea and vomiting in pregnancy is usually not needed  You can make changes in the foods you eat and in your activities to help manage your symptoms  You may need to try several things to learn what works for you  Talk to your healthcare provider if your symptoms do not decrease with the changes suggested below  You may need vitamin B6 and medicine if these changes do not help, or your symptoms become severe  Nutrition changes you can make to manage nausea and vomiting:   · Eat small meals throughout the day instead of 3 large meals  You may be more likely to have nausea and vomiting when your stomach is empty  Eat foods that are low in fat and high in protein  Examples are lean meat, beans, turkey, and chicken without the skin  Eat a small snack, such as crackers, dry cereal, or a small sandwich before you go to bed  · Eat some crackers or dry toast before you get out of bed in the morning  Get out of bed slowly  Sudden movements could cause you to get dizzy and nauseated  · Eat bland foods when you feel nauseated  Examples of bland foods include dry toast, dry cereal, plain pasta, white rice, and bread  Other bland foods include saltine crackers, bananas, gelatin, and pretzels  Avoid spicy, greasy, and fried foods  Avoid any other foods that make you feel nauseated  · Drink liquids that contain ginger  Drink ginger ale made with real ginger or ginger tea made with fresh grated ginger  Ginger capsules or ginger candies may also help to decrease nausea and vomiting  · Drink liquids between meals instead of with meals  Wait at least 30 minutes after you eat to drink liquids   Drink small amounts of liquids often throughout the day to prevent dehydration  Ask how much liquid you should drink each day  Other changes you can make to manage nausea and vomiting:   · Avoid smells that bother you  Strong odors may cause nausea and vomiting to start, or make it worse  Take a short walk, turn on a fan, or try to sleep with the window open to get fresh air  When you are cooking, open windows to get rid of smells that may cause nausea  · Do not brush your teeth right after you eat  if it makes you nauseated  · Rest when you need to  Start activity slowly and work up to your usual routine as you start to feel better  · Talk to your healthcare provider about your prenatal vitamins  Prenatal vitamins can cause nausea for some women  Try taking your prenatal vitamin at night or with a snack  If this change does not help, your healthcare provider may recommend a different type of vitamin  · Do not use any medicines, vitamins, or supplements to manage your symptoms without asking your healthcare provider  Many medicines can harm an unborn baby  · Light to moderate exercise  may help to decrease your symptoms  It may also help you to sleep better at night  Ask your healthcare provider about the best exercise plan for you  Follow up with your healthcare provider as directed:  Write down your questions so you remember to ask them during your visits  © 2017 [facility] Information is for End User's use only and may not be sold, redistributed or otherwise used for commercial purposes  All illustrations and images included in CareNotes® are the copyrighted property of [employer]  or Bhupendra Shafer  The above information is an  only  It is not intended as medical advice for individual conditions or treatments  Talk to your doctor, nurse or pharmacist before following any medical regimen to see if it is safe and effective for you            Tdap:     Diphtheria/Acellular Pertussis/Tetanus Booster Vaccine (Tdap) (By injection)   Pertussis Vaccine, Acellular (per-TUS-iss VAX-een, k-FBSD-ebv-lar), Reduced Diphtheria Toxoid (ree-DOOST dif-THEER-ee-a TOX-oyd), Tetanus Toxoid (TET-a-nus TOX-oyd)  Protects against infections caused by tetanus (lockjaw), diphtheria, or pertussis (whooping cough)  This is a booster vaccine  Brand Name(s): Adacel, Boostrix   There may be other brand names for this medicine  When This Medicine Should Not Be Used: You should not receive this vaccine if you have had an allergic reaction to the separate or combined tetanus, diphtheria, or pertussis vaccine  You should not receive this vaccine if you have had seizures, mental changes, or any other serious reaction within 7 days after you received a pertussis vaccine  How to Use This Medicine:   Injectable  · A nurse or other health provider will give you this medicine  · Your doctor will prescribe your exact dose and tell you how often it should be given  This medicine is given as a shot into one of your muscles  · You may receive other vaccines at the same time as this one, but in a different body area  You should receive patient instructions for all of the vaccines  Talk to your doctor or nurse if you have questions  · Read and follow the patient instructions that come with this medicine  Talk to your doctor or pharmacist if you have any questions  Drugs and Foods to Avoid:   Ask your doctor or pharmacist before using any other medicine, including over-the-counter medicines, vitamins, and herbal products  · Make sure the doctor knows if you are receiving a treatment or medicine that weakens your immune system  This includes radiation treatment, steroid medicines (such as dexamethasone, hydrocortisone, methylprednisolone, prednisolone, prednisone, Medrol®), or cancer medicines    Warnings While Using This Medicine:   · Make sure your doctor knows if you are pregnant or breastfeeding or have epilepsy, a weak immune system, or a history of a stroke  Tell your doctor if you are sick or have a fever  · Tell your doctor about any reaction you had after you received a vaccine  This includes fainting, seizures, a fever over 105 degrees F, or severe redness or swelling where the shot was given  Tell your doctor if you have a history of Guillain-Barré syndrome after you received a vaccine with tetanus  · Call your doctor right away if you faint or have vision changes, numbness or tingling in your arms, hands, or feet, or a seizure after you receive this vaccine  · Tell your doctor if you have an allergy to latex  The syringes may contain dry natural latex rubber  · This vaccine will not treat an active infection  If you have a diphtheria, tetanus, or pertussis infection, you will need medicine to treat the infection  Possible Side Effects While Using This Medicine:   Call your doctor right away if you notice any of these side effects:  · Allergic reaction: Itching or hives, swelling in your face or hands, swelling or tingling in your mouth or throat, chest tightness, trouble breathing  · Changes in vision  · Fever over 105 degrees F  · Lightheadedness or fainting  · Numbness, tingling, or burning pain in your hands, arms, legs, or feet  · Seizures  · Sudden numbness or weakness in your arms or legs  · Severe pain, redness, or swelling where the shot was given  If you notice these less serious side effects, talk with your doctor:   · Headache  · Mild pain, redness, or swelling where the shot was given  · Nausea, vomiting, diarrhea, or stomach pain  · Tiredness  If you notice other side effects that you think are caused by this medicine, tell your doctor  Call your doctor for medical advice about side effects  You may report side effects to FDA at 6-867-NSC-0575  © 2017 [facility] Information is for End User's use only and may not be sold, redistributed or otherwise used for commercial purposes    The above information is an  only  It is not intended as medical advice for individual conditions or treatments  Talk to your doctor, nurse or pharmacist before following any medical regimen to see if it is safe and effective for you

## 2019-07-19 NOTE — PROGRESS NOTES
OB INTAKE INTERVIEW      Pt presents for OB intake  Accompanied by: Self    FOB Involved: Yes  Planned pregnancy: No         Last Menstrual Period:   Patient's last menstrual period was 2019 (exact date)  Ultrasound date:   19  10 weeks 4 days   Estimated date of delivery:   Estimated Date of Delivery: 20  confirmed by U/S   ? Signs/Symptoms of Pregnancy  Constipation : No   Headaches : No   PICA cravings : No  Cramping/spotting : No       Diabetes-  hx of GDM: No  BMI >35: No   First degree relative with type 2 diabetes: No  Hx of PCOS:No   Current metformin use; No  Prior hx of LGA/macrosomia: No   AMA with other risk factors: No     Hypertension-   Hx of chronic HTN: No   Hx of gestational HTN: No  Hx of preeclampsia, eclampsia: No  HELLP syndrome: No    Infection Screening- Does the pt have a hx of MRSA? No  Discussed Tdap vaccine         Immunizations:   Immunization History   Administered Date(s) Administered    Influenza Quadrivalent, 6-35 Months IM 2017    Influenza, injectable, quadrivalent, preservative free 0 5 mL 10/02/2018       ? Interview education  [facility] Pregnancy Essentials Book reviewed and discussed   Handouts given: Baby and Me phone alban guide  Baby and Me support center  [facility]  Discussed genetic testing-    - pt interested : Yes    ONAF submitted: Yes     H&P visit scheduled  19  Last pap smear: None on file     Scripts given for prenatal panel and hemoglobin electrophoresis  Patient has sequential screen scheduled for 19  Patient has complaints of vaginal irritation   Apt made to see Sixtosommer Morejon today      The patient was oriented to our practice and all questions were answered    Interviewed by: Janak Hector RN 19

## 2019-07-19 NOTE — PROGRESS NOTES
Assessment/Plan:    1  Yeast infection  miconazole (MONISTAT-7) 2 % vaginal cream   2  Early stage of pregnancy              Explained wet mount was positive for yeast  Safe and effective use of Monistat 7 was provided  Discussed comfort measures  Plan  Use Monistat 7 as directed  Call with needs or concerns  Return for prenatal history and physical  Pt verbalized understanding of all discussed  Subjective:      Patient ID: Marta Xie is a 26 y o  female  HPI   Pt presents for prenatal intake with the nurse Des Arrieta  Pt describes vaginal discharge and vulvar itching for    several days  1 partner x 4 years        The following portions of the patient's history were reviewed and updated as appropriate: allergies, current medications, past family history, past medical history, past social history, past surgical history and problem list     Review of Systems    Pertinent items were noted in the HPI  Objective:      LMP 04/23/2019 (Exact Date)     /69 Wt  139     Physical Exam    Alert and oriented  Denies pain  Vulva negative lesions, slight erythema  Vagina erythema, positive thick white discharge   Cervix positive thick white discharge, negative lesions  Wet mount positive for yeast

## 2019-07-22 ENCOUNTER — ROUTINE PRENATAL (OUTPATIENT)
Dept: PERINATAL CARE | Facility: OTHER | Age: 24
End: 2019-07-22
Payer: COMMERCIAL

## 2019-07-22 VITALS
HEART RATE: 97 BPM | WEIGHT: 140.8 LBS | DIASTOLIC BLOOD PRESSURE: 72 MMHG | BODY MASS INDEX: 25.91 KG/M2 | SYSTOLIC BLOOD PRESSURE: 108 MMHG | HEIGHT: 62 IN

## 2019-07-22 DIAGNOSIS — Z36.9 ENCOUNTER FOR FETAL ULTRASOUND: ICD-10-CM

## 2019-07-22 DIAGNOSIS — Z13.79 GENETIC SCREENING: ICD-10-CM

## 2019-07-22 DIAGNOSIS — Z3A.12 12 WEEKS GESTATION OF PREGNANCY: Primary | ICD-10-CM

## 2019-07-22 PROCEDURE — 76813 OB US NUCHAL MEAS 1 GEST: CPT | Performed by: OBSTETRICS & GYNECOLOGY

## 2019-07-22 PROCEDURE — 99213 OFFICE O/P EST LOW 20 MIN: CPT | Performed by: OBSTETRICS & GYNECOLOGY

## 2019-07-22 NOTE — PROGRESS NOTES
Ob ultrasound: An ultrasound for viability, dating and nuchal translucency was completed today  See Ob Procedures in EPIC  1  Live, be fetus with size = dates; JESE 01-   Normal nuchal translucency  Today's ultrasound findings and suggested follow-up were discussed  with the patient  The Sequential Screen was discussed in detail, including the sensitivity for detection of Down syndrome  Definitive prenatal diagnosis is possible only through genetic amniocentesis or CVS       The patient decided NOT TO HAVE a fingerstick blood collection for hCG and JOEY-A to complete the initial component of the Sequential Screen  2   MSAFP at 16 weeks gestation  3   Fetal Level II ultrasound imaging is scheduled at about 20 weeks gestation  In addition to review of the ultrasound results I completed a consultation in 15 minutes with > 50% in direct face to face contact and coordination of a plan of care  Thank you for referring your patient to our offices  The limitations of ultrasound to detect all anomalies was reviewed and how it is not  a test to rule out aneuploidy  If you have any further questions do not hesitate to contact us as [phone]      Karon Roche MD

## 2019-08-09 ENCOUNTER — TELEPHONE (OUTPATIENT)
Dept: OBGYN CLINIC | Facility: CLINIC | Age: 24
End: 2019-08-09

## 2019-08-13 ENCOUNTER — ROUTINE PRENATAL (OUTPATIENT)
Dept: OBGYN CLINIC | Facility: CLINIC | Age: 24
End: 2019-08-13

## 2019-08-13 VITALS — BODY MASS INDEX: 26.19 KG/M2 | SYSTOLIC BLOOD PRESSURE: 110 MMHG | DIASTOLIC BLOOD PRESSURE: 64 MMHG | WEIGHT: 143.2 LBS

## 2019-08-13 DIAGNOSIS — Z36.9 ENCOUNTER FOR FETAL ULTRASOUND: ICD-10-CM

## 2019-08-13 DIAGNOSIS — Z13.79 GENETIC SCREENING: ICD-10-CM

## 2019-08-13 DIAGNOSIS — Z3A.16 16 WEEKS GESTATION OF PREGNANCY: Primary | ICD-10-CM

## 2019-08-13 DIAGNOSIS — J45.40 MODERATE PERSISTENT ASTHMA WITHOUT COMPLICATION: ICD-10-CM

## 2019-08-13 PROCEDURE — 87591 N.GONORRHOEAE DNA AMP PROB: CPT | Performed by: OBSTETRICS & GYNECOLOGY

## 2019-08-13 PROCEDURE — 87491 CHLMYD TRACH DNA AMP PROBE: CPT | Performed by: OBSTETRICS & GYNECOLOGY

## 2019-08-13 PROCEDURE — G0143 SCR C/V CYTO,THINLAYER,RESCR: HCPCS | Performed by: OBSTETRICS & GYNECOLOGY

## 2019-08-13 PROCEDURE — 87210 SMEAR WET MOUNT SALINE/INK: CPT | Performed by: OBSTETRICS & GYNECOLOGY

## 2019-08-13 PROCEDURE — 99214 OFFICE O/P EST MOD 30 MIN: CPT | Performed by: OBSTETRICS & GYNECOLOGY

## 2019-08-13 RX ORDER — ALBUTEROL SULFATE 90 UG/1
2 AEROSOL, METERED RESPIRATORY (INHALATION) EVERY 6 HOURS PRN
Qty: 18 G | Refills: 0 | Status: SHIPPED | OUTPATIENT
Start: 2019-08-13 | End: 2019-12-04 | Stop reason: SDUPTHER

## 2019-08-13 NOTE — PROGRESS NOTES
OB/GYN  PRENATAL H&P VISIT  Jacquie Muller  2019  11:39 AM  Dr Edy Tabor MD      SUBJECTIVE  Patient is a 19yo  at 16w0d here for initial prenatal H&P  This is an unintended and desired pregnancy  FOB is involved, not currently present with patient  She is currently doing well, but a little tired  She works at [employer]  She denies a hx of STD/STI, denies a hx of TB or close contacts with persons with TB  She denies a family history of inheritable conditions such as physical or intellectual disabilities, birth defects, blood disorders, heart or neural tube defects  She has not had MRSA  She denies recent travel or travel planned in the near future  She denies use of nicotine or recreational drug use  She denies vaginal bleeding, cramping, leakage, abnormal discharge  She has a medical history notable for asthma, and takes albuterol PRN for it  No surgeries in the past, no medications other than albuterol and prenatal vitamins  Complete a full 7 day course of monistat for a yeast infection after her last visit, with improvement in her symptoms of discharge and discomfort, but as of the past day or two, she has been feeling return of discomfort, and feels like "it's about to come back again " Desires testing for recurrence of yeast infection  OB History    Para Term  AB Living   1 0 0 0 0 0   SAB TAB Ectopic Multiple Live Births   0 0 0 0 0      # Outcome Date GA Lbr Antolin/2nd Weight Sex Delivery Anes PTL Lv   1 Current                Review of Systems   Constitutional: Negative for fever  HENT: Negative for rhinorrhea, sinus pressure, sneezing and sore throat  Eyes: Negative for visual disturbance  Respiratory: Negative for cough, shortness of breath and wheezing  Cardiovascular: Negative for chest pain, palpitations and leg swelling  Gastrointestinal: Negative for abdominal distention, abdominal pain, blood in stool, nausea and vomiting  Genitourinary: Positive for vaginal pain  Negative for dysuria, flank pain, vaginal bleeding and vaginal discharge  Musculoskeletal: Negative for neck pain and neck stiffness  Skin: Negative for color change, pallor and rash  Neurological: Negative for light-headedness, numbness and headaches         Past Medical History:   Diagnosis Date    Asthma     Albuterol prn    Varicella     as child and immunized       Past Surgical History:   Procedure Laterality Date    NO PAST SURGERIES         Social History     Socioeconomic History    Marital status: Single     Spouse name: Not on file    Number of children: Not on file    Years of education: Not on file    Highest education level: Not on file   Occupational History    Not on file   Social Needs    Financial resource strain: Not on file    Food insecurity:     Worry: Not on file     Inability: Not on file    Transportation needs:     Medical: Not on file     Non-medical: Not on file   Tobacco Use    Smoking status: Never Smoker    Smokeless tobacco: Never Used   Substance and Sexual Activity    Alcohol use: Not Currently     Frequency: Monthly or less     Drinks per session: 1 or 2    Drug use: Never    Sexual activity: Yes     Partners: Male     Birth control/protection: None   Lifestyle    Physical activity:     Days per week: Not on file     Minutes per session: Not on file    Stress: Not on file   Relationships    Social connections:     Talks on phone: Not on file     Gets together: Not on file     Attends Faith service: Not on file     Active member of club or organization: Not on file     Attends meetings of clubs or organizations: Not on file     Relationship status: Not on file    Intimate partner violence:     Fear of current or ex partner: Not on file     Emotionally abused: Not on file     Physically abused: Not on file     Forced sexual activity: Not on file   Other Topics Concern    Not on file   Social History Narrative Student       OBJECTIVE  Vitals:    19 1059   BP: 110/64     Physical Exam   Constitutional: She appears well-developed and well-nourished  No distress  Genitourinary: Pelvic exam was performed with patient supine  There is no rash, tenderness, lesion or injury on the right labia  There is no rash, tenderness, lesion or injury on the left labia  Vagina exhibits no lesion and no rugosity  No erythema, tenderness or bleeding in the vagina  No foreign body in the vagina  No signs of injury around the vagina  No vaginal discharge found  Right adnexum does not display fullness  Left adnexum does not display fullness  Cervix exhibits discharge (white, watery discharge)  Cervix does not exhibit motion tenderness, lesion, friability or polyp  Uterus is not tender or mobile  HENT:   Head: Normocephalic and atraumatic  Eyes: Conjunctivae and EOM are normal    Neck: Normal range of motion  Neck supple  Cardiovascular: Normal rate, regular rhythm, normal heart sounds and intact distal pulses  Exam reveals no gallop and no friction rub  No murmur heard  Pulmonary/Chest: Effort normal and breath sounds normal  No stridor  No respiratory distress  She has no wheezes  She has no rales  She exhibits no tenderness  Abdominal: Soft  She exhibits no distension and no mass  There is no tenderness  There is no rebound and no guarding  No hernia  Musculoskeletal: Normal range of motion  She exhibits no edema or deformity  Neurological: She is alert  Skin: Skin is warm and dry  She is not diaphoretic  No erythema  Wet mount: no clue cells, no infection noted  KOH: no hyphae, no abnormalities noted  ASSESSMENT AND PLAN    23 y o , , with /64   Wt 65 kg (143 lb 3 2 oz)   LMP 2019 (Exact Date) , at 16w0d here for her prenatal H&P   by doppler    1  Pregnancy: H&P completed today  PN Labs not yet performed, encouraged patient to obtain labs today   Labor expectations discussed with patient, including appointment schedule, nutrition, exercise, medications, sexual intercourse, and nausea/vomiting  Patient's BMI is 26  Recommended weight gain is 15-25lbs  2  Screening: Pap smear collected today  GC/CT collected  Sequential screening reviewed with patient - first part completed, will complete second part soon  No infections noted on wet mount/KOH, but could be confounded by use of monistat cream today  Follow up with patient on symptoms at next visit    3  Consents: Delivery process including potential OVD and  reviewed  Sign delivery consent form at 28 weeks  4  Labor: For analgesia, patient plans on epidural     5  Postpartum: Patient plans on breastfeeding  Different methods of contraception were discussed with patient, including progesterone only oral pills, depo provera, nexplanon, mirena, and paragard  Patient is uncertain would like to use during postpartum phase  6  Follow up: RTC in 4 weeks  Precautions regarding labor, leakage, bleeding, and fetal movement reviewed  7  Asthma: continue home medications, provided with refill on albuterol script today and also provided with peak flow meter      Ignacio Kim MD  2019  11:39 AM

## 2019-08-15 LAB
C TRACH DNA SPEC QL NAA+PROBE: NEGATIVE
N GONORRHOEA DNA SPEC QL NAA+PROBE: NEGATIVE

## 2019-08-16 ENCOUNTER — TELEPHONE (OUTPATIENT)
Dept: LABOR AND DELIVERY | Facility: HOSPITAL | Age: 24
End: 2019-08-16

## 2019-08-16 DIAGNOSIS — B37.9 YEAST INFECTION: Primary | ICD-10-CM

## 2019-08-16 LAB
LAB AP GYN PRIMARY INTERPRETATION: NORMAL
Lab: NORMAL
PATH INTERP SPEC-IMP: NORMAL

## 2019-08-16 RX ORDER — FLUCONAZOLE 150 MG/1
150 TABLET ORAL ONCE
Qty: 1 TABLET | Refills: 1 | Status: SHIPPED | OUTPATIENT
Start: 2019-08-16 | End: 2019-08-16

## 2019-08-16 NOTE — TELEPHONE ENCOUNTER
Called patient to inform her of negative GC/CT results, as well as negative pap smear and need for repeat pap smear in 3 years  Also informed patient that yeast was noted on pap smear, suggesting recurrence of her yeast infection  We had discussed that she had started feeling symptoms of recurrence at last visit  Ordered a 1 time dose of fluconazole, given that cream did not cause full remission  Plan for patient to take one time dose of fluoconazole, and to repeat dose in 7 days if symptoms recur  Patient expressed understanding, and will start medication  Patient stated she will be getting labs done tomorrow      Kevin Bear MD  Ob/Gyn PGY-1  08/16/19

## 2019-08-17 DIAGNOSIS — J45.40 MODERATE PERSISTENT ASTHMA WITHOUT COMPLICATION: ICD-10-CM

## 2019-08-23 ENCOUNTER — APPOINTMENT (OUTPATIENT)
Dept: LAB | Facility: HOSPITAL | Age: 24
End: 2019-08-23
Payer: COMMERCIAL

## 2019-08-23 DIAGNOSIS — Z3A.13 13 WEEKS GESTATION OF PREGNANCY: ICD-10-CM

## 2019-08-23 LAB
ABO GROUP BLD: NORMAL
BASOPHILS # BLD AUTO: 0.07 THOUSANDS/ΜL (ref 0–0.1)
BASOPHILS NFR BLD AUTO: 1 % (ref 0–1)
BILIRUB UR QL STRIP: NEGATIVE
BLD GP AB SCN SERPL QL: NEGATIVE
CLARITY UR: NORMAL
COLOR UR: YELLOW
EOSINOPHIL # BLD AUTO: 0.26 THOUSAND/ΜL (ref 0–0.61)
EOSINOPHIL NFR BLD AUTO: 3 % (ref 0–6)
ERYTHROCYTE [DISTWIDTH] IN BLOOD BY AUTOMATED COUNT: 13.7 % (ref 11.6–15.1)
GLUCOSE UR STRIP-MCNC: NEGATIVE MG/DL
HCT VFR BLD AUTO: 38 % (ref 34.8–46.1)
HGB BLD-MCNC: 12.1 G/DL (ref 11.5–15.4)
HGB UR QL STRIP.AUTO: NEGATIVE
IMM GRANULOCYTES # BLD AUTO: 0.05 THOUSAND/UL (ref 0–0.2)
IMM GRANULOCYTES NFR BLD AUTO: 1 % (ref 0–2)
KETONES UR STRIP-MCNC: NEGATIVE MG/DL
LEUKOCYTE ESTERASE UR QL STRIP: NEGATIVE
LYMPHOCYTES # BLD AUTO: 1.92 THOUSANDS/ΜL (ref 0.6–4.47)
LYMPHOCYTES NFR BLD AUTO: 23 % (ref 14–44)
MCH RBC QN AUTO: 28.3 PG (ref 26.8–34.3)
MCHC RBC AUTO-ENTMCNC: 31.8 G/DL (ref 31.4–37.4)
MCV RBC AUTO: 89 FL (ref 82–98)
MONOCYTES # BLD AUTO: 0.85 THOUSAND/ΜL (ref 0.17–1.22)
MONOCYTES NFR BLD AUTO: 10 % (ref 4–12)
NEUTROPHILS # BLD AUTO: 5.39 THOUSANDS/ΜL (ref 1.85–7.62)
NEUTS SEG NFR BLD AUTO: 62 % (ref 43–75)
NITRITE UR QL STRIP: NEGATIVE
NRBC BLD AUTO-RTO: 0 /100 WBCS
PH UR STRIP.AUTO: 6.5 [PH]
PLATELET # BLD AUTO: 305 THOUSANDS/UL (ref 149–390)
PMV BLD AUTO: 9.8 FL (ref 8.9–12.7)
PROT UR STRIP-MCNC: NEGATIVE MG/DL
RBC # BLD AUTO: 4.27 MILLION/UL (ref 3.81–5.12)
RH BLD: POSITIVE
SP GR UR STRIP.AUTO: 1.02 (ref 1–1.03)
SPECIMEN EXPIRATION DATE: NORMAL
UROBILINOGEN UR QL STRIP.AUTO: 0.2 E.U./DL
WBC # BLD AUTO: 8.54 THOUSAND/UL (ref 4.31–10.16)

## 2019-08-23 PROCEDURE — 87086 URINE CULTURE/COLONY COUNT: CPT

## 2019-08-23 PROCEDURE — 81003 URINALYSIS AUTO W/O SCOPE: CPT

## 2019-08-23 PROCEDURE — 83020 HEMOGLOBIN ELECTROPHORESIS: CPT

## 2019-08-23 PROCEDURE — 36415 COLL VENOUS BLD VENIPUNCTURE: CPT

## 2019-08-23 PROCEDURE — 80081 OBSTETRIC PANEL INC HIV TSTG: CPT

## 2019-08-24 ENCOUNTER — HOSPITAL ENCOUNTER (EMERGENCY)
Facility: HOSPITAL | Age: 24
Discharge: HOME | End: 2019-08-24
Attending: EMERGENCY MEDICINE | Admitting: EMERGENCY MEDICINE
Payer: COMMERCIAL

## 2019-08-24 VITALS
WEIGHT: 148.37 LBS | OXYGEN SATURATION: 99 % | SYSTOLIC BLOOD PRESSURE: 118 MMHG | DIASTOLIC BLOOD PRESSURE: 59 MMHG | BODY MASS INDEX: 27.14 KG/M2 | RESPIRATION RATE: 18 BRPM | HEART RATE: 84 BPM | TEMPERATURE: 97.6 F

## 2019-08-24 DIAGNOSIS — K62.5 BRBPR (BRIGHT RED BLOOD PER RECTUM): Primary | ICD-10-CM

## 2019-08-24 LAB
BILIRUB UR QL STRIP: NEGATIVE
CLARITY UR: CLEAR
COLOR UR: YELLOW
GLUCOSE UR STRIP-MCNC: NEGATIVE MG/DL
HGB UR QL STRIP.AUTO: NEGATIVE
KETONES UR STRIP-MCNC: NEGATIVE MG/DL
LEUKOCYTE ESTERASE UR QL STRIP: NEGATIVE
NITRITE UR QL STRIP: NEGATIVE
PH UR STRIP.AUTO: 7 [PH]
PROT UR STRIP-MCNC: NEGATIVE MG/DL
RPR SER QL: NORMAL
RUBV IGG SERPL IA-ACNC: 38.6 IU/ML
SP GR UR STRIP.AUTO: 1.02 (ref 1–1.03)
UROBILINOGEN UR QL STRIP.AUTO: 0.2 E.U./DL

## 2019-08-24 PROCEDURE — 99284 EMERGENCY DEPT VISIT MOD MDM: CPT

## 2019-08-24 PROCEDURE — 81003 URINALYSIS AUTO W/O SCOPE: CPT | Performed by: EMERGENCY MEDICINE

## 2019-08-24 PROCEDURE — 99283 EMERGENCY DEPT VISIT LOW MDM: CPT | Performed by: EMERGENCY MEDICINE

## 2019-08-24 PROCEDURE — 76815 OB US LIMITED FETUS(S): CPT | Performed by: EMERGENCY MEDICINE

## 2019-08-24 RX ORDER — DOCUSATE SODIUM 100 MG/1
100 CAPSULE, LIQUID FILLED ORAL 2 TIMES DAILY PRN
Qty: 20 CAPSULE | Refills: 0 | Status: SHIPPED | OUTPATIENT
Start: 2019-08-24 | End: 2019-11-07 | Stop reason: ALTCHOICE

## 2019-08-24 NOTE — ED PROVIDER NOTES
History  Chief Complaint   Patient presents with    Rectal Bleeding     Pt states that she is 17 weeks pregnant, denies OB related complaints, states that after she had a BM today there "was a lot of blood in the toilet and on the toilet paper", pt states she is sure it was not vaginal bleeding  States afterward she had about an hour of sharp abdominal pain which has now subsided  A 25 y/o female  at 16 weeks gestation; presents with bright red blood per rectum  Patient states having a bowel movement several hours ago, and noticing bright red blood mixed with her stool and on the toilet paper  Patient states shortly afterwards she did have lower abdominal pain, however that has since resolved  Patient states her stool was formed, denying constipation or straining  Patient otherwise denies fever, chills, chest pain, shortness of breath, nausea, vomiting, diarrhea, dysuria, peripheral edema and rashes  Patient states her pregnancy has been uncomplicated thus far  Patient denies vaginal bleeding and vaginal discharge  Patient does report feeling the baby move  A/P:  Bright red blood per rectum, likely secondary to hemorrhoid  No external hemorrhoids or anal fissures appreciated  Gravid uterus palpated, however otherwise benign abdominal exam   Fetal heart tones obtained by ultrasound and within normal limits  Will recommend stool softeners and follow up with OB  History provided by:  Patient      Prior to Admission Medications   Prescriptions Last Dose Informant Patient Reported? Taking?    Prenatal MV-Min-Fe Fum-FA-DHA (PRENATAL 1 PO)  Self Yes No   Sig: Take by mouth   Prenatal Vit-Fe Fumarate-FA (PRENATAL VITAMIN) 27-0 8 MG TABS  Self No No   Sig: Take 1 tablet by mouth daily   VENTOLIN  (90 Base) MCG/ACT inhaler   No No   Sig: TAKE 2 PUFFS BY MOUTH EVERY 6 HOURS AS NEEDED FOR WHEEZE   albuterol (PROVENTIL HFA,VENTOLIN HFA) 90 mcg/act inhaler   No No   Sig: Inhale 2 puffs every 6 (six) hours as needed for wheezing      Facility-Administered Medications: None       Past Medical History:   Diagnosis Date    Asthma     Albuterol prn    Varicella     as child and immunized       Past Surgical History:   Procedure Laterality Date    NO PAST SURGERIES         Family History   Problem Relation Age of Onset    Cancer Maternal Grandmother     Breast cancer Maternal Aunt     No Known Problems Mother     No Known Problems Father      I have reviewed and agree with the history as documented  Social History     Tobacco Use    Smoking status: Never Smoker    Smokeless tobacco: Never Used   Substance Use Topics    Alcohol use: Not Currently     Frequency: Monthly or less     Drinks per session: 1 or 2    Drug use: Never        Review of Systems   Gastrointestinal: Positive for abdominal pain and blood in stool  All other systems reviewed and are negative  Physical Exam  Physical Exam  General Appearance: alert and oriented, nad, non toxic appearing  Skin:  Warm, dry, intact  HEENT: atraumatic, normocephalic  Neck: Supple, trachea midline  Cardiac: RRR; no murmurs, rub, gallops  Pulmonary: lungs CTAB; no wheezes, rales, rhonchi  Gastrointestinal: abdomen soft, nontender, nondistended; no guarding or rebound tenderness; good bowel sounds, no mass or bruits  Rectal exam chaperoned by RN  No external hemorrhoids or fissures    Extremities:  no pedal edema, 2+ pulses; no calf tenderness, no clubbing, no cyanosis  Neuro:  no focal motor or sensory deficits, CN 2-12 grossly intact  Psych:  Normal mood and affect, normal judgement and insight      Vital Signs  ED Triage Vitals [08/24/19 1542]   Temperature Pulse Respirations Blood Pressure SpO2   97 6 °F (36 4 °C) 84 18 118/59 99 %      Temp Source Heart Rate Source Patient Position - Orthostatic VS BP Location FiO2 (%)   Temporal Monitor Sitting Right arm --      Pain Score       No Pain           Vitals:    08/24/19 1542   BP: 118/59 Pulse: 84   Patient Position - Orthostatic VS: Sitting         Visual Acuity      ED Medications  Medications - No data to display    Diagnostic Studies  Results Reviewed     Procedure Component Value Units Date/Time    UA w Reflex to Microscopic [[id number]] Collected:  08/24/19 1625    Lab Status:  Final result Specimen:  Urine, Clean Catch Updated:  08/24/19 1634     Color, UA Yellow     Clarity, UA Clear     Specific [place], UA 1 020     pH, UA 7 0     Leukocytes, UA Negative     Nitrite, UA Negative     Protein, UA Negative mg/dl      Glucose, UA Negative mg/dl      Ketones, UA Negative mg/dl      Urobilinogen, UA 0 2 E U /dl      Bilirubin, UA Negative     Blood, UA Negative                 No orders to display              Procedures  Pelvic Ultrasound  Performed by: Joaquina Howard DO  Authorized by: Joaquina Howard DO     Procedure date/time:  8/24/2019 4:05 PM  Patient location:  ED  Procedure details:     Indications: pregnant with abdominal pain      Assess:  Fetal viability and intrauterine pregnancy    Technique:  Transabdominal obstetric (limited) exam  Uterine findings:     Intrauterine pregnancy: identified      Single gestation: identified      Fetal pole: identified      Fetal heart rate: identified      Fetal heart rate (bpm):  147           ED Course  ED Course as of Aug 24 1744   Sat Aug 24, 2019   1645 UA negative for infection  Will discharge home, recommending patient take stool softeners to alleviate symptoms  Recommend OBGYN follow up if abdominal pain returns/worsens or she develops vaginal bleeding                                    MDM    Disposition  Final diagnoses:   BRBPR (bright red blood per rectum)     Time reflects when diagnosis was documented in both MDM as applicable and the Disposition within this note     Time User Action Codes Description Comment    8/24/2019  4:46 PM Lena Kilpatrick Add [K62 5] BRBPR (bright red blood per rectum)       ED Disposition     ED Disposition Condition Date/Time Comment    Discharge Stable Sat Aug 24, 2019  4:46 PM [address] discharge to home/self care  Follow-up Information     Follow up With Specialties Details Why Contact Info Kylah Jack [facility] Schedule an appointment as soon as possible for a visit in 3 days For re-evaluation Salvatore Farr [id number]  [address], [address], [place], [place], [address] [address]  [facility] Emergency Department Emergency Medicine Go to  If symptoms worsen [facility] [id number] [phone] [place] ED, [address] [facility] , [place], [place], [address]          Discharge Medication List as of 8/24/2019  4:48 PM      START taking these medications    Details   docusate sodium (COLACE) 100 mg capsule Take 1 capsule (100 mg total) by mouth 2 (two) times a day as needed for constipation, Starting Sat 8/24/2019, Print         CONTINUE these medications which have NOT CHANGED    Details   !! albuterol (PROVENTIL HFA,VENTOLIN HFA) 90 mcg/act inhaler Inhale 2 puffs every 6 (six) hours as needed for wheezing, Starting Tue 8/13/2019, Print      Prenatal MV-Min-Fe Fum-FA-DHA (PRENATAL 1 PO) Take by mouth, Historical Med      Prenatal Vit-Fe Fumarate-FA (PRENATAL VITAMIN) 27-0 8 MG TABS Take 1 tablet by mouth daily, Starting Fri 6/21/2019, Normal      !! VENTOLIN  (90 Base) MCG/ACT inhaler TAKE 2 PUFFS BY MOUTH EVERY 6 HOURS AS NEEDED FOR WHEEZE, Normal       !! - Potential duplicate medications found  Please discuss with provider  No discharge procedures on file      ED Provider  Electronically Signed by           Carlos Richardson DO  08/24/19 2245

## 2019-08-25 LAB
BACTERIA UR CULT: ABNORMAL
HIV 1+2 AB+HIV1 P24 AG SERPL QL IA: NORMAL

## 2019-08-26 LAB
DEPRECATED HGB OTHER BLD-IMP: 0 %
HGB A MFR BLD: 2.2 % (ref 1.8–3.2)
HGB A MFR BLD: 97.8 % (ref 96.4–98.8)
HGB C MFR BLD: 0 %
HGB F MFR BLD: 0 % (ref 0–2)
HGB FRACT BLD-IMP: NORMAL
HGB S BLD QL SOLY: NEGATIVE
HGB S MFR BLD: 0 %

## 2019-08-28 LAB — HBV SURFACE AG SER QL: NORMAL

## 2019-09-09 ENCOUNTER — ROUTINE PRENATAL (OUTPATIENT)
Dept: OBGYN CLINIC | Facility: CLINIC | Age: 24
End: 2019-09-09

## 2019-09-09 ENCOUNTER — ROUTINE PRENATAL (OUTPATIENT)
Dept: PERINATAL CARE | Facility: OTHER | Age: 24
End: 2019-09-09
Payer: COMMERCIAL

## 2019-09-09 VITALS
BODY MASS INDEX: 28.55 KG/M2 | HEIGHT: 61 IN | HEART RATE: 85 BPM | DIASTOLIC BLOOD PRESSURE: 70 MMHG | SYSTOLIC BLOOD PRESSURE: 110 MMHG | WEIGHT: 151.2 LBS

## 2019-09-09 VITALS — BODY MASS INDEX: 27.44 KG/M2 | SYSTOLIC BLOOD PRESSURE: 114 MMHG | WEIGHT: 150 LBS | DIASTOLIC BLOOD PRESSURE: 71 MMHG

## 2019-09-09 DIAGNOSIS — Z34.92 PRENATAL CARE IN SECOND TRIMESTER: Primary | ICD-10-CM

## 2019-09-09 DIAGNOSIS — Z36.3 ENCOUNTER FOR ANTENATAL SCREENING FOR MALFORMATION: Primary | ICD-10-CM

## 2019-09-09 DIAGNOSIS — Z36.86 ENCOUNTER FOR ANTENATAL SCREENING FOR CERVICAL LENGTH: ICD-10-CM

## 2019-09-09 DIAGNOSIS — Z36.9 ENCOUNTER FOR FETAL ULTRASOUND: ICD-10-CM

## 2019-09-09 DIAGNOSIS — Z3A.19 19 WEEKS GESTATION OF PREGNANCY: ICD-10-CM

## 2019-09-09 PROBLEM — B37.9 YEAST INFECTION: Status: RESOLVED | Noted: 2019-07-19 | Resolved: 2019-09-09

## 2019-09-09 PROBLEM — Z34.90 EARLY STAGE OF PREGNANCY: Status: RESOLVED | Noted: 2019-07-19 | Resolved: 2019-09-09

## 2019-09-09 PROCEDURE — 76805 OB US >/= 14 WKS SNGL FETUS: CPT | Performed by: OBSTETRICS & GYNECOLOGY

## 2019-09-09 PROCEDURE — 76817 TRANSVAGINAL US OBSTETRIC: CPT | Performed by: OBSTETRICS & GYNECOLOGY

## 2019-09-09 PROCEDURE — 99214 OFFICE O/P EST MOD 30 MIN: CPT | Performed by: NURSE PRACTITIONER

## 2019-09-09 NOTE — PATIENT INSTRUCTIONS
Keep appointment for Level II Ultrasound  Call with vaginal bleeding, loss of fluid, regular contractions, other needs or concerns    Return in 4 weeks

## 2019-09-09 NOTE — LETTER
September 9, 2019     [facility] PROVIDER    Patient: Suman Tineo   YOB: 1995   Date of Visit: 9/9/2019       Dear   Provider: Thank you for referring Suman Tineo to me for evaluation  Below are my notes for this consultation  If you have questions, please do not hesitate to call me  I look forward to following your patient along with you  Sincerely,        Desire Macedo MD        CC: No Recipients  Desire Macedo MD  9/9/2019  5:48 PM  Sign at close encounter    Please refer to the [facility] ultrasound report in Ob Procedures for additional information regarding the visit to the [facility]  today    Desire Macedo MD

## 2019-09-09 NOTE — PROGRESS NOTES
Please refer to the [facility] ultrasound report in Ob Procedures for additional information regarding the visit to the [facility]  today    Tevin Stephens MD

## 2019-09-09 NOTE — PROGRESS NOTES
Assessment & Plan  26 y o  Rosa Muller at 19w6d presenting for routine prenatal visit  Has appointment for Level II U/S today    Problem List Items Addressed This Visit        Other    19 weeks gestation of pregnancy    Prenatal care in second trimester - Primary        ____________________________________________________________  Subjective  She is without complaint  She denies contractions, loss of fluid, or vaginal bleeding  She feels regular fetal movements      Objective  /71   Wt 68 kg (150 lb)   LMP 04/23/2019 (Exact Date)   BMI 27 44 kg/m²          Patient's Active Problem List  Patient Active Problem List   Diagnosis    Genetic screening    19 weeks gestation of pregnancy    Prenatal care in second trimester     Plan  Keep appointment for Level II Ultrasound  Call with vaginal bleeding, loss of fluid, regular contractions, other needs or concerns  Return in 4 weeks  Pt verbalized understanding of all discussed

## 2019-09-09 NOTE — PROGRESS NOTES
A transvaginal ultrasound was performed  Sonographer note on use of High Level Disinfection Process (Trophon) for transvaginal probe#  2  used, serial # [id number]    Christy Bhakta RDMS

## 2019-09-10 PROBLEM — Z36.9 ENCOUNTER FOR FETAL ULTRASOUND: Status: ACTIVE | Noted: 2019-09-10

## 2019-09-10 PROBLEM — Z13.79 GENETIC SCREENING: Status: ACTIVE | Noted: 2019-09-10

## 2019-09-12 PROBLEM — Z34.92 PRENATAL CARE IN SECOND TRIMESTER: Status: ACTIVE | Noted: 2019-09-12

## 2019-09-20 ENCOUNTER — HOSPITAL ENCOUNTER (EMERGENCY)
Facility: HOSPITAL | Age: 24
Discharge: HOME | End: 2019-09-20
Attending: EMERGENCY MEDICINE
Payer: COMMERCIAL

## 2019-09-20 VITALS
SYSTOLIC BLOOD PRESSURE: 112 MMHG | BODY MASS INDEX: 28.49 KG/M2 | WEIGHT: 150.79 LBS | RESPIRATION RATE: 18 BRPM | HEART RATE: 104 BPM | DIASTOLIC BLOOD PRESSURE: 60 MMHG | TEMPERATURE: 99.1 F | OXYGEN SATURATION: 98 %

## 2019-09-20 DIAGNOSIS — R51.9 HEADACHE: ICD-10-CM

## 2019-09-20 DIAGNOSIS — B34.9 VIRAL ILLNESS: ICD-10-CM

## 2019-09-20 DIAGNOSIS — R50.9 FEVER: Primary | ICD-10-CM

## 2019-09-20 LAB
ALBUMIN SERPL BCP-MCNC: 2.7 G/DL (ref 3.5–5)
ALP SERPL-CCNC: 127 U/L (ref 46–116)
ALT SERPL W P-5'-P-CCNC: 23 U/L (ref 12–78)
ANION GAP SERPL CALCULATED.3IONS-SCNC: 10 MMOL/L (ref 4–13)
AST SERPL W P-5'-P-CCNC: 19 U/L (ref 5–45)
BASOPHILS # BLD AUTO: 0.04 THOUSANDS/ΜL (ref 0–0.1)
BASOPHILS NFR BLD AUTO: 1 % (ref 0–1)
BILIRUB DIRECT SERPL-MCNC: 0.14 MG/DL (ref 0–0.2)
BILIRUB SERPL-MCNC: 0.33 MG/DL (ref 0.2–1)
BILIRUB UR QL STRIP: NEGATIVE
BUN SERPL-MCNC: 3 MG/DL (ref 5–25)
CALCIUM SERPL-MCNC: 8.6 MG/DL (ref 8.3–10.1)
CHLORIDE SERPL-SCNC: 101 MMOL/L (ref 100–108)
CLARITY UR: CLEAR
CO2 SERPL-SCNC: 23 MMOL/L (ref 21–32)
COLOR UR: YELLOW
CREAT SERPL-MCNC: 0.5 MG/DL (ref 0.6–1.3)
EOSINOPHIL # BLD AUTO: 0.07 THOUSAND/ΜL (ref 0–0.61)
EOSINOPHIL NFR BLD AUTO: 1 % (ref 0–6)
ERYTHROCYTE [DISTWIDTH] IN BLOOD BY AUTOMATED COUNT: 13.6 % (ref 11.6–15.1)
GFR SERPL CREATININE-BSD FRML MDRD: 138 ML/MIN/1.73SQ M
GLUCOSE SERPL-MCNC: 87 MG/DL (ref 65–140)
GLUCOSE UR STRIP-MCNC: NEGATIVE MG/DL
HCT VFR BLD AUTO: 34.3 % (ref 34.8–46.1)
HGB BLD-MCNC: 11.1 G/DL (ref 11.5–15.4)
HGB UR QL STRIP.AUTO: NEGATIVE
IMM GRANULOCYTES # BLD AUTO: 0.04 THOUSAND/UL (ref 0–0.2)
IMM GRANULOCYTES NFR BLD AUTO: 1 % (ref 0–2)
KETONES UR STRIP-MCNC: NEGATIVE MG/DL
LEUKOCYTE ESTERASE UR QL STRIP: NEGATIVE
LYMPHOCYTES # BLD AUTO: 1.31 THOUSANDS/ΜL (ref 0.6–4.47)
LYMPHOCYTES NFR BLD AUTO: 19 % (ref 14–44)
MAGNESIUM SERPL-MCNC: 1.8 MG/DL (ref 1.6–2.6)
MCH RBC QN AUTO: 28.2 PG (ref 26.8–34.3)
MCHC RBC AUTO-ENTMCNC: 32.4 G/DL (ref 31.4–37.4)
MCV RBC AUTO: 87 FL (ref 82–98)
MONOCYTES # BLD AUTO: 1.02 THOUSAND/ΜL (ref 0.17–1.22)
MONOCYTES NFR BLD AUTO: 15 % (ref 4–12)
NEUTROPHILS # BLD AUTO: 4.54 THOUSANDS/ΜL (ref 1.85–7.62)
NEUTS SEG NFR BLD AUTO: 63 % (ref 43–75)
NITRITE UR QL STRIP: NEGATIVE
NRBC BLD AUTO-RTO: 0 /100 WBCS
PH UR STRIP.AUTO: 8 [PH]
PLATELET # BLD AUTO: 294 THOUSANDS/UL (ref 149–390)
PMV BLD AUTO: 9.3 FL (ref 8.9–12.7)
POTASSIUM SERPL-SCNC: 3.3 MMOL/L (ref 3.5–5.3)
PROT SERPL-MCNC: 7 G/DL (ref 6.4–8.2)
PROT UR STRIP-MCNC: NEGATIVE MG/DL
RBC # BLD AUTO: 3.93 MILLION/UL (ref 3.81–5.12)
SODIUM SERPL-SCNC: 134 MMOL/L (ref 136–145)
SP GR UR STRIP.AUTO: <=1.005 (ref 1–1.03)
TSH SERPL DL<=0.05 MIU/L-ACNC: 1.42 UIU/ML (ref 0.36–3.74)
UROBILINOGEN UR QL STRIP.AUTO: 0.2 E.U./DL
WBC # BLD AUTO: 7.02 THOUSAND/UL (ref 4.31–10.16)

## 2019-09-20 PROCEDURE — 99284 EMERGENCY DEPT VISIT MOD MDM: CPT | Performed by: EMERGENCY MEDICINE

## 2019-09-20 PROCEDURE — 96374 THER/PROPH/DIAG INJ IV PUSH: CPT

## 2019-09-20 PROCEDURE — 36415 COLL VENOUS BLD VENIPUNCTURE: CPT | Performed by: EMERGENCY MEDICINE

## 2019-09-20 PROCEDURE — 84443 ASSAY THYROID STIM HORMONE: CPT | Performed by: EMERGENCY MEDICINE

## 2019-09-20 PROCEDURE — 80048 BASIC METABOLIC PNL TOTAL CA: CPT | Performed by: EMERGENCY MEDICINE

## 2019-09-20 PROCEDURE — 83735 ASSAY OF MAGNESIUM: CPT | Performed by: EMERGENCY MEDICINE

## 2019-09-20 PROCEDURE — 99283 EMERGENCY DEPT VISIT LOW MDM: CPT

## 2019-09-20 PROCEDURE — 96361 HYDRATE IV INFUSION ADD-ON: CPT

## 2019-09-20 PROCEDURE — 85025 COMPLETE CBC W/AUTO DIFF WBC: CPT | Performed by: EMERGENCY MEDICINE

## 2019-09-20 PROCEDURE — 81003 URINALYSIS AUTO W/O SCOPE: CPT | Performed by: EMERGENCY MEDICINE

## 2019-09-20 PROCEDURE — 80076 HEPATIC FUNCTION PANEL: CPT | Performed by: EMERGENCY MEDICINE

## 2019-09-20 RX ORDER — METOCLOPRAMIDE 10 MG/1
10 TABLET ORAL EVERY 6 HOURS
Qty: 30 TABLET | Refills: 0 | Status: SHIPPED | OUTPATIENT
Start: 2019-09-20 | End: 2019-11-07 | Stop reason: ALTCHOICE

## 2019-09-20 RX ORDER — METOCLOPRAMIDE HYDROCHLORIDE 5 MG/ML
10 INJECTION INTRAMUSCULAR; INTRAVENOUS ONCE
Status: COMPLETED | OUTPATIENT
Start: 2019-09-20 | End: 2019-09-20

## 2019-09-20 RX ADMIN — METOCLOPRAMIDE 10 MG: 5 INJECTION, SOLUTION INTRAMUSCULAR; INTRAVENOUS at 22:05

## 2019-09-20 RX ADMIN — SODIUM CHLORIDE 1000 ML: 0.9 INJECTION, SOLUTION INTRAVENOUS at 22:04

## 2019-09-21 NOTE — DISCHARGE INSTRUCTIONS
Continue to take acetaminophen regularly for your fevers  Use Reglan for headaches, make sure to drink plenty of fluids  Speak with your family doctor, you should be seen in the office to make sure your symptoms are improving

## 2019-09-21 NOTE — ED PROVIDER NOTES
History  Chief Complaint   Patient presents with    Fever - 9 weeks to 74 years     Patient reports intermittent fevers, bodyaches and headache for past 2 days  Patient is about 21 weeks pregnant, denies abdominal pain or vaginal bleeding  Took 500 mg of Tylenol 1 hr pta  28 YO female presents with fevers for the last 3 days  States this is associated with diffuse myalgias and a headache  Pt is currently ~21 weeks pregnant, denies abdominal discomfort, vaginal bleeding or discharge, dysuria  States her temperature has been 101 0 constantly for the last few days despite taking acetaminophen  She dnies known sick contacts, has no nasal congestion, runny nose, sore throat  She has been having a non-productive cough for the last few days, no chest pain or shortness of breath  Pt denies CP/SOB/N/V/D/C, no dysuria, burning on urination or blood in urine          History provided by:  Patient and relative   used: No    Fever - 9 weeks to 74 years   Max temp prior to arrival:  101 0  Temp source:  Oral  Severity:  Moderate  Onset quality:  Gradual  Duration:  3 days  Timing:  Constant  Progression:  Unchanged  Chronicity:  New  Relieved by:  Nothing  Worsened by:  Nothing  Ineffective treatments:  Acetaminophen  Associated symptoms: chills, cough, headaches and myalgias    Associated symptoms: no chest pain, no confusion, no congestion, no diarrhea, no dysuria, no rash, no rhinorrhea, no sore throat and no vomiting    Cough:     Cough characteristics:  Non-productive    Severity:  Mild    Onset quality:  Gradual    Duration:  3 days    Timing:  Intermittent    Progression:  Waxing and waning    Chronicity:  New  Headaches:     Severity:  Moderate    Onset quality:  Gradual    Duration:  3 days    Timing:  Intermittent    Progression:  Waxing and waning    Chronicity:  New  Myalgias:     Location:  Generalized    Quality:  Aching    Severity:  Moderate    Onset quality:  Gradual    Duration:  3 days Timing:  Constant    Progression:  Waxing and waning      Prior to Admission Medications   Prescriptions Last Dose Informant Patient Reported? Taking? Prenatal MV-Min-Fe Fum-FA-DHA (PRENATAL 1 PO)  Self Yes Yes   Sig: Take by mouth   Prenatal Vit-Fe Fumarate-FA (PRENATAL VITAMIN) 27-0 8 MG TABS  Self No Yes   Sig: Take 1 tablet by mouth daily   VENTOLIN  (90 Base) MCG/ACT inhaler  Self No Yes   Sig: TAKE 2 PUFFS BY MOUTH EVERY 6 HOURS AS NEEDED FOR WHEEZE   albuterol (PROVENTIL HFA,VENTOLIN HFA) 90 mcg/act inhaler  Self No Yes   Sig: Inhale 2 puffs every 6 (six) hours as needed for wheezing   docusate sodium (COLACE) 100 mg capsule  Self No Yes   Sig: Take 1 capsule (100 mg total) by mouth 2 (two) times a day as needed for constipation      Facility-Administered Medications: None       Past Medical History:   Diagnosis Date    Asthma     Albuterol prn    Varicella     as child and immunized       Past Surgical History:   Procedure Laterality Date    NO PAST SURGERIES         Family History   Problem Relation Age of Onset    Cancer Maternal Grandmother     Breast cancer Maternal Aunt     No Known Problems Mother     No Known Problems Father      I have reviewed and agree with the history as documented  Social History     Tobacco Use    Smoking status: Never Smoker    Smokeless tobacco: Never Used   Substance Use Topics    Alcohol use: Not Currently     Frequency: Monthly or less     Drinks per session: 1 or 2    Drug use: Never        Review of Systems   Constitutional: Positive for chills and fever  Negative for fatigue  HENT: Negative for congestion, dental problem, rhinorrhea and sore throat  Eyes: Negative for visual disturbance  Respiratory: Positive for cough  Negative for shortness of breath  Cardiovascular: Negative for chest pain  Gastrointestinal: Negative for abdominal pain, diarrhea and vomiting  Genitourinary: Negative for dysuria and frequency     Musculoskeletal: Positive for myalgias  Negative for arthralgias  Skin: Negative for rash  Neurological: Positive for headaches  Negative for dizziness, weakness and light-headedness  Psychiatric/Behavioral: Negative for agitation, behavioral problems and confusion  All other systems reviewed and are negative  Physical Exam  Physical Exam   Constitutional: She is oriented to person, place, and time  She appears well-developed and well-nourished  HENT:   Head: Normocephalic and atraumatic  Eyes: Pupils are equal, round, and reactive to light  EOM are normal    Neck: Normal range of motion  Cardiovascular: Normal rate, regular rhythm and normal heart sounds  Pulmonary/Chest: Effort normal and breath sounds normal    Abdominal: Soft  Musculoskeletal: Normal range of motion  Neurological: She is alert and oriented to person, place, and time  Skin: Skin is warm and dry  Psychiatric: She has a normal mood and affect  Her behavior is normal  Thought content normal    Nursing note and vitals reviewed        Vital Signs  ED Triage Vitals [09/20/19 2125]   Temperature Pulse Respirations Blood Pressure SpO2   99 1 °F (37 3 °C) 104 18 112/60 98 %      Temp Source Heart Rate Source Patient Position - Orthostatic VS BP Location FiO2 (%)   Oral Monitor Sitting Right arm --      Pain Score       2           Vitals:    09/20/19 2125   BP: 112/60   Pulse: 104   Patient Position - Orthostatic VS: Sitting         Visual Acuity      ED Medications  Medications   sodium chloride 0 9 % bolus 1,000 mL (0 mL Intravenous Stopped 9/20/19 2306)   metoclopramide (REGLAN) injection 10 mg (10 mg Intravenous Given 9/20/19 2205)       Diagnostic Studies  Results Reviewed     Procedure Component Value Units Date/Time    Hepatic function panel [[id number]]  (Abnormal) Collected:  09/20/19 2202    Lab Status:  Final result Specimen:  Blood from Arm, Left Updated:  09/20/19 2243     Total Bilirubin 0 33 mg/dL      Bilirubin, Direct 0 14 mg/dL Alkaline Phosphatase 127 U/L      AST 19 U/L      ALT 23 U/L      Total Protein 7 0 g/dL      Albumin 2 7 g/dL     TSH [[id number]]  (Normal) Collected:  09/20/19 2202    Lab Status:  Final result Specimen:  Blood from Arm, Left Updated:  09/20/19 2243     TSH 3RD GENERATON 1 421 uIU/mL     Narrative:       Patients undergoing fluorescein dye angiography may retain small amounts of fluorescein in the body for 48-72 hours post procedure  Samples containing fluorescein can produce falsely depressed TSH values  If the patient had this procedure,a specimen should be resubmitted post fluorescein clearance        Magnesium [[id number]]  (Normal) Collected:  09/20/19 2202    Lab Status:  Final result Specimen:  Blood from Arm, Left Updated:  09/20/19 2243     Magnesium 1 8 mg/dL     UA w Reflex to Microscopic [[id number]] Collected:  09/20/19 2225    Lab Status:  Final result Specimen:  Urine, Clean Catch Updated:  09/20/19 2236     Color, UA Yellow     Clarity, UA Clear     Specific Gravity, UA <=1 005     pH, UA 8 0     Leukocytes, UA Negative     Nitrite, UA Negative     Protein, UA Negative mg/dl      Glucose, UA Negative mg/dl      Ketones, UA Negative mg/dl      Urobilinogen, UA 0 2 E U /dl      Bilirubin, UA Negative     Blood, UA Negative    Basic metabolic panel [[id number]]  (Abnormal) Collected:  09/20/19 2202    Lab Status:  Final result Specimen:  Blood from Arm, Left Updated:  09/20/19 2232     Sodium 134 mmol/L      Potassium 3 3 mmol/L      Chloride 101 mmol/L      CO2 23 mmol/L      ANION GAP 10 mmol/L      BUN 3 mg/dL      Creatinine 0 50 mg/dL      Glucose 87 mg/dL      Calcium 8 6 mg/dL      eGFR 138 ml/min/1 73sq m     Narrative:       Gutierrez guidelines for Chronic Kidney Disease (CKD):     Stage 1 with normal or high GFR (GFR > 90 mL/min/1 73 square meters)    Stage 2 Mild CKD (GFR = 60-89 mL/min/1 73 square meters)    Stage 3A Moderate CKD (GFR = 45-59 mL/min/1 73 square meters)    Stage 3B Moderate CKD (GFR = 30-44 mL/min/1 73 square meters)    Stage 4 Severe CKD (GFR = 15-29 mL/min/1 73 square meters)    Stage 5 End Stage CKD (GFR <15 mL/min/1 73 square meters)  Note: GFR calculation is accurate only with a steady state creatinine    CBC and differential [[id number]]  (Abnormal) Collected:  09/20/19 2202    Lab Status:  Final result Specimen:  Blood from Arm, Left Updated:  09/20/19 2215     WBC 7 02 Thousand/uL      RBC 3 93 Million/uL      Hemoglobin 11 1 g/dL      Hematocrit 34 3 %      MCV 87 fL      MCH 28 2 pg      MCHC 32 4 g/dL      RDW 13 6 %      MPV 9 3 fL      Platelets 952 Thousands/uL      nRBC 0 /100 WBCs      Neutrophils Relative 63 %      Immat GRANS % 1 %      Lymphocytes Relative 19 %      Monocytes Relative 15 %      Eosinophils Relative 1 %      Basophils Relative 1 %      Neutrophils Absolute 4 54 Thousands/µL      Immature Grans Absolute 0 04 Thousand/uL      Lymphocytes Absolute 1 31 Thousands/µL      Monocytes Absolute 1 02 Thousand/µL      Eosinophils Absolute 0 07 Thousand/µL      Basophils Absolute 0 04 Thousands/µL                  No orders to display              Procedures  Procedures       ED Course  ED Course as of Sep 21 0039   Fri Sep 20, 2019   2302 Pt states improvement in symptoms, denies headache at this time  MDM  Number of Diagnoses or Management Options  Fever: new and requires workup  Headache: new and requires workup  Viral illness: new and requires workup  Diagnosis management comments: 1  Fever - Pt appears well, afebrile in department  Will check urine for infection, metabolic panel for electrolyte abnormalities and dehydration, CBC  Will give fluids and reglan, recommend continued use of acetaminophen for likely viral illness and follow up with PCP and OB          Amount and/or Complexity of Data Reviewed  Clinical lab tests: ordered and reviewed  Obtain history from someone other than the patient: yes    Patient Progress  Patient progress: improved      Disposition  Final diagnoses:   Fever   Viral illness   Headache     Time reflects when diagnosis was documented in both MDM as applicable and the Disposition within this note     Time User Action Codes Description Comment    9/20/2019 11:00 PM Yeny LARKIN Add [R50 9] Fever     9/20/2019 11:01 PM Yeny LARKIN Add [B34 9] Viral illness     9/20/2019 11:02 PM [place] [place] Headache       ED Disposition     ED Disposition Condition Date/Time Comment    Discharge Stable Fri Sep 20, 2019 11:00 PM [address] discharge to home/self care  Follow-up Information     Follow up With Specialties Details Why Contact Info    Roger Courtney PA-C Family Medicine, Physician Assistant   [address] [facility]  [address] [facility] [address]  [phone]            Discharge Medication List as of 9/20/2019 11:03 PM      START taking these medications    Details   metoclopramide (REGLAN) 10 mg tablet Take 1 tablet (10 mg total) by mouth every 6 (six) hours, Starting Fri 9/20/2019, Print         CONTINUE these medications which have NOT CHANGED    Details   !! albuterol (PROVENTIL HFA,VENTOLIN HFA) 90 mcg/act inhaler Inhale 2 puffs every 6 (six) hours as needed for wheezing, Starting Tue 8/13/2019, Print      docusate sodium (COLACE) 100 mg capsule Take 1 capsule (100 mg total) by mouth 2 (two) times a day as needed for constipation, Starting Sat 8/24/2019, Print      Prenatal MV-Min-Fe Fum-FA-DHA (PRENATAL 1 PO) Take by mouth, Historical Med      Prenatal Vit-Fe Fumarate-FA (PRENATAL VITAMIN) 27-0 8 MG TABS Take 1 tablet by mouth daily, Starting Fri 6/21/2019, Normal      !! VENTOLIN  (90 Base) MCG/ACT inhaler TAKE 2 PUFFS BY MOUTH EVERY 6 HOURS AS NEEDED FOR WHEEZE, Normal       !! - Potential duplicate medications found  Please discuss with provider  No discharge procedures on file      ED Provider  Electronically Signed by           Vincenzo Murray MD  09/21/19 6907

## 2019-09-27 ENCOUNTER — ROUTINE PRENATAL (OUTPATIENT)
Dept: OBGYN CLINIC | Facility: CLINIC | Age: 24
End: 2019-09-27

## 2019-09-27 VITALS — SYSTOLIC BLOOD PRESSURE: 118 MMHG | WEIGHT: 150.2 LBS | DIASTOLIC BLOOD PRESSURE: 71 MMHG | BODY MASS INDEX: 28.38 KG/M2

## 2019-09-27 DIAGNOSIS — Z34.92 PRENATAL CARE IN SECOND TRIMESTER: Primary | ICD-10-CM

## 2019-09-27 DIAGNOSIS — Z3A.22 22 WEEKS GESTATION OF PREGNANCY: ICD-10-CM

## 2019-09-27 DIAGNOSIS — B37.9 YEAST INFECTION: ICD-10-CM

## 2019-09-27 LAB
BV WHIFF TEST VAG QL: NEGATIVE
CLUE CELLS SPEC QL WET PREP: NEGATIVE
PH SMN: 4.5 [PH]
SL AMB POCT WET MOUNT: ABNORMAL
T VAGINALIS VAG QL WET PREP: NEGATIVE
YEAST VAG QL WET PREP: POSITIVE

## 2019-09-27 PROCEDURE — 99214 OFFICE O/P EST MOD 30 MIN: CPT | Performed by: NURSE PRACTITIONER

## 2019-09-27 PROCEDURE — 87210 SMEAR WET MOUNT SALINE/INK: CPT | Performed by: NURSE PRACTITIONER

## 2019-09-27 NOTE — PROGRESS NOTES
Assessment & Plan  27 y o  Kaylee Ramsay at 22w3d presenting for routine prenatal visit  Pt had WNL Level II U/S growth and anatomy, has follow up U/S at 28 weeks for missed anatomy of fetal head due to fetal position  Pt C/O vaginal itching and discharge  Pt was previously ordered F;uconazole and still notes symptoms, Pt states she tried Monistat & for 1 night and had buring so she stopped  Pt states she tried Monistat in the past and did not have burning  Pt is willing to try Monistat & again, safe and effective use is provided    Physical exam  Alert and oriented  Denies pain  WNL Respiratory effort  Vulva negative lesions or erythema  Vagina positive thick white discharge   Cervix closed negative lesions, thick white discharge  Wet mount was positive for yeast    Problem List Items Addressed This Visit        Other    Prenatal care in second trimester - Primary    Relevant Medications    miconazole (MONISTAT-7) 2 % vaginal cream    22 weeks gestation of pregnancy    Yeast infection    Relevant Medications    miconazole (MONISTAT-7) 2 % vaginal cream    Other Relevant Orders    POCT wet mount (Completed)        ____________________________________________________________  Subjective     She denies contractions, loss of fluid, or vaginal bleeding  She feels regular fetal movements      Objective  /71   Wt 68 1 kg (150 lb 3 2 oz)   LMP 04/23/2019 (Exact Date)   BMI 28 38 kg/m²          Patient's Active Problem List  Patient Active Problem List   Diagnosis    Prenatal care in second trimester    22 weeks gestation of pregnancy    Yeast infection     Plan  Use Monistat & as directed  Wear loose clothes and cotton underwear  Call with vaginal bleeding, loss of fluid, regular contractions, other needs or concerns  Return in 4 weeks  Pt verbalized understanding of all discussed

## 2019-09-27 NOTE — PATIENT INSTRUCTIONS
Use Monistat & as directed  Wear loose clothes and cotton underwear  Call with vaginal bleeding, loss of fluid, regular contractions, other needs or concerns    Return in 4 weeks

## 2019-09-30 ENCOUNTER — TELEPHONE (OUTPATIENT)
Dept: OTHER | Facility: OTHER | Age: 24
End: 2019-09-30

## 2019-09-30 ENCOUNTER — TELEPHONE (OUTPATIENT)
Dept: LABOR AND DELIVERY | Facility: HOSPITAL | Age: 24
End: 2019-09-30

## 2019-10-01 ENCOUNTER — ROUTINE PRENATAL (OUTPATIENT)
Dept: OBGYN CLINIC | Facility: CLINIC | Age: 24
End: 2019-10-01

## 2019-10-01 VITALS
WEIGHT: 150 LBS | SYSTOLIC BLOOD PRESSURE: 114 MMHG | HEART RATE: 83 BPM | HEIGHT: 61 IN | BODY MASS INDEX: 28.32 KG/M2 | DIASTOLIC BLOOD PRESSURE: 62 MMHG

## 2019-10-01 DIAGNOSIS — R30.0 DYSURIA DURING PREGNANCY IN SECOND TRIMESTER: ICD-10-CM

## 2019-10-01 DIAGNOSIS — Z3A.23 23 WEEKS GESTATION OF PREGNANCY: Primary | ICD-10-CM

## 2019-10-01 DIAGNOSIS — O26.892 DYSURIA DURING PREGNANCY IN SECOND TRIMESTER: ICD-10-CM

## 2019-10-01 LAB
BACTERIA UR QL AUTO: ABNORMAL /HPF
BILIRUB UR QL STRIP: NEGATIVE
CLARITY UR: ABNORMAL
COLOR UR: YELLOW
GLUCOSE UR STRIP-MCNC: NEGATIVE MG/DL
HGB UR QL STRIP.AUTO: ABNORMAL
KETONES UR STRIP-MCNC: NEGATIVE MG/DL
LEUKOCYTE ESTERASE UR QL STRIP: ABNORMAL
NITRITE UR QL STRIP: NEGATIVE
NON-SQ EPI CELLS URNS QL MICRO: ABNORMAL /HPF
PH UR STRIP.AUTO: 7 [PH]
PROT UR STRIP-MCNC: NEGATIVE MG/DL
RBC #/AREA URNS AUTO: ABNORMAL /HPF
SP GR UR STRIP.AUTO: 1.02 (ref 1–1.03)
UROBILINOGEN UR QL STRIP.AUTO: 1 E.U./DL
WBC #/AREA URNS AUTO: ABNORMAL /HPF

## 2019-10-01 PROCEDURE — 81001 URINALYSIS AUTO W/SCOPE: CPT | Performed by: OBSTETRICS & GYNECOLOGY

## 2019-10-01 PROCEDURE — PNV: Performed by: OBSTETRICS & GYNECOLOGY

## 2019-10-01 PROCEDURE — 87086 URINE CULTURE/COLONY COUNT: CPT | Performed by: OBSTETRICS & GYNECOLOGY

## 2019-10-01 RX ORDER — NITROFURANTOIN 25; 75 MG/1; MG/1
100 CAPSULE ORAL 2 TIMES DAILY
Qty: 10 CAPSULE | Refills: 0 | Status: SHIPPED | OUTPATIENT
Start: 2019-10-01 | End: 2019-10-06

## 2019-10-01 NOTE — PROGRESS NOTES
OB/GYN prenatal visit    S: 27 y o  Kyara Lang 23w0d here for PN problem visit  She has dysuria, urgency, frequency, and hematuria  She states her urine appears orange tinged  She denies fevers, chills, nausea, and vomiting  She had a yeast infection a few weeks ago that was successfully treated with Monistat  She has no other obstetric complaints, including pelvic pain, contractions, vaginal bleeding, loss of fluid, or decreased fetal movement  O:  Vitals:    10/01/19 1128   BP: 114/62   Pulse: 83       Gen: no acute distress, nonlabored breathing    Physical Exam   Constitutional: She appears well-developed and well-nourished  Cardiovascular: Normal rate, regular rhythm and normal heart sounds  Exam reveals no gallop and no friction rub  No murmur heard  Pulmonary/Chest: Effort normal and breath sounds normal  No respiratory distress  She has no wheezes  Abdominal: Soft  Bowel sounds are normal  There is no tenderness  There is no CVA tenderness  Fetal heart tones: 151bpm       A/P:    Problem List Items Addressed This Visit     None        1)UTI   UA and urine culture sent, treated with Macrobid 100mg BID for 5 days  2) Return in 4 weeks for prenatal and 28 week lab slips      20 weeks: level II ultrasound order  24-28 weeks: 28 week labs (CBC, RPR, 1hr GTT), Rh status/rhogam, [facility]  28-32 weeks: tdap, flu vaccine, sign consent form, check in card, [facility]  34 weeks: perineal massage card, contraception and birth plan, [employer]  36 weeks: collect GBS/PCN allergy, check fetal position, contraception and birth plan, Ronita Gowers, MD  10/1/2019  11:38 AM

## 2019-10-01 NOTE — LETTER
October 1, 2019     Patient: Glen Quan   YOB: 1995   Date of Visit: 10/1/2019       To Whom it May Concern:    Jacquie Laurel Kapoor is under my professional care  She was seen in my office on 10/1/2019  She may return to work on 10/2/19  If you have any questions or concerns, please don't hesitate to call           Sincerely,          Sinan Gutierrez MD        CC: No Recipients

## 2019-10-01 NOTE — TELEPHONE ENCOUNTER
Jacquie called with complaints of burning with urination  She states that she has symptoms of a UTI  She also had a "little tiny bit of spotting" when she urinated  She has a history of a yeast infection that has been difficult to treat  She was prescribed Monistat 7 day but states she only completed 3 days of treatment due to burning  She was advised to try the cream again but felt better so she did not try again  She also states that earlier this morning she had some cramping but she is unsure if it was due to constipation or upset stomach (both of which she also endorses)  She was counseled that given vaginal bleeding, burning with urinary and cramping, she should be evaluated on L&D for safety  She declined stating that she would like an appointment tomorrow at [facility] instead  She was counseled that should she have any more episodes of bleeding or cramping, she should report to [facility] L&D  She agreed and states that she will call  in the morning for an appointment  Michelle Denney MD  OB/GYN  10/1/2019  5:18 AM

## 2019-10-01 NOTE — TELEPHONE ENCOUNTER
Standard Call Report # [id number]  Health Call  Patient Name: Dru Juares  (Female)  : 1995    Age: 27 Y 15 D  Return Phone #:  [phone] (Home)  Address: [place] [address]  City/State/Zip: [address] [place] [address]  Practice Name:  Sanjana Blake Name: Ani Handley - self  Return Phone Number: [phone] (Home)  Presenting Problem: "I think I have a UTI and I am  spotting  I am 22 weeks pregnant "  Service Type: Triage  Charged Service 1: N/A  Nurse Assessment  Nurse: Rochelle Martinez RN, Jaqui Tineo Date/Time: 2019 7:38:40 PM  Type of assessment required:  General (Adult or Child)  Duration of Current S/S:  Onset of vaginal spotting 1630 today /   Onset of burning at the end of passing urine at the same time  Location/Radiation: Vaginal spotting and 23 weeks pregnant with first pregnancy  Temperature (F) and route: Denies fever  Symptom Specific Meds (Dose/Time):  ---None / on Pre trista vitamins and Docusate for constipation  Other S/S  ---Vaginal spotting / burning at end of passing urine / some abdominal cramping earlier  today but none presently  Pain Scale on scale of 1-10, 10 being the worst:  Cramping was 5/10 earlier  Symptom progression:  same  Intake and Output  WNL / WNL  LMP/ Pregnancy:  19 / Pregnant 23 weeks  Breastfeeding  No  Last Exam/Treatment:  19 for routine pre  care  Protocols  Protocol Title Nurse Date/Time  Pregnancy - Vaginal Bleeding Greater Than 20 Arpan Byrne RN, Jaqui Tineo 2019 7:49:09 PM  Question Caller Affirmed  Disp  Time Disposition Final User  2019 7:54:52 PM Go to L&D Now Silvana Bryan RN, Hellen Clam  2019 8:34:13 PM RN Triaged Yes Silvana Bryan RN, [address] Advice Given Per Protocol  GO TO L&D NOW: You need to be seen  Go to the Labor and Delivery Unit or the Emergency Room at [address] [place] [address]  now  Drive carefully  * Another adult should drive   BRING A TOWEL FOR THE CAR RIDE (FOR THE BLEEDING): * Place a  menstrual pad inside your underwear  * Bring a towel for the car ride; you may wish to put it on the seat of your car  NOTHING BY  MOUTH: Do not eat or drink anything for now  (Reason: condition may need a procedure, surgery, or general anesthesia) CALL EMS  IF: * You feel too weak to stand * You pass out or feel like passing out * You feel much worse  CARE ADVICE given per Pregnancy   Vaginal Bleeding over 20 Weeks EGA (Adult) guideline  Caller Understands: Yes  Caller Disagree/Comply: Comply  PreDisposition: Unsure  Comments  User: Hernán Murillo RN Date/Time: 9/30/2019 8:20:12 PM  Ms César Carroll is NOT a pt of [facility]  She goes to [facility] at [place] [address]  User: Hernán Murillo RN Date/Time: 9/30/2019 8:33:21 PM  Initially Ms Lakisha Silva agreed that she would go to the ER/ L&D at [place] [address]  She then became reluctant and  was not sure that she would immediately go but would wait and see if she would feel better  Advised her that she should just  wait, and I will call the the [facility] Resident on call  Either the resident or I will call her back  // Paged and spoke with Dr July Joseph, the resident on call for [facility]  Presented the pt's history regarding the possible UTI, spotting on the toilet  paper x's 2, and the abdominal cramping  Also informed her that after initially agreeing to go to the hospital, she then became  reluctatnt and verbalized that perhaps she might wait  Dr Fei Sam stated that she will call the pt  back to determine her status  and make any further recommendations

## 2019-10-02 LAB — BACTERIA UR CULT: ABNORMAL

## 2019-10-31 ENCOUNTER — ROUTINE PRENATAL (OUTPATIENT)
Dept: OBGYN CLINIC | Facility: CLINIC | Age: 24
End: 2019-10-31

## 2019-10-31 VITALS — WEIGHT: 156.2 LBS | SYSTOLIC BLOOD PRESSURE: 110 MMHG | BODY MASS INDEX: 29.51 KG/M2 | DIASTOLIC BLOOD PRESSURE: 70 MMHG

## 2019-10-31 DIAGNOSIS — Z3A.23 23 WEEKS GESTATION OF PREGNANCY: ICD-10-CM

## 2019-10-31 DIAGNOSIS — Z3A.27 27 WEEKS GESTATION OF PREGNANCY: Primary | ICD-10-CM

## 2019-10-31 PROCEDURE — PNV: Performed by: OBSTETRICS & GYNECOLOGY

## 2019-10-31 PROCEDURE — 90471 IMMUNIZATION ADMIN: CPT | Performed by: OBSTETRICS & GYNECOLOGY

## 2019-10-31 PROCEDURE — 90686 IIV4 VACC NO PRSV 0.5 ML IM: CPT | Performed by: OBSTETRICS & GYNECOLOGY

## 2019-10-31 NOTE — PROGRESS NOTES
OB/GYN prenatal visit    S: 27 y o  Shannon Torres 27w2d here for PN visit  She has some pain under the ribs that wraps around to the back on the right side that comes and goes  She denies pelvic pain, contractions, cramping, vaginal bleeding, loss of fluid, or decreased fetal movement  She was previously  She was previously treated for a UTI and finished her course of Macrobid  Denies any dysuria and hematuria today      O:  Vitals:    10/31/19 1649   BP: 110/70       Gen: no acute distress, nonlabored breathing   Fundal height: 27cm  FHR: 138       A/P:    Problem List Items Addressed This Visit        Other    23 weeks gestation of pregnancy      Other Visit Diagnoses     27 weeks gestation of pregnancy    -  Primary    Relevant Orders    CBC and differential    RPR    Glucose, 1H PG    influenza vaccine, [id number], quadrivalent, 0 5 mL, preservative-free, for adult and pediatric patients 6 mos+ (AFLURIA, FLUARIX, FLULAVAL, FLUZONE)          Plan:  -28 week labs and 1 hour glucose ordered  -Patient is Rh positive, does not need Rhogam   -Completed Macrobid for a UTI, urine dipstick negative for infection    -Flu shot given today  -FKC and PTL precautions reviewed  -RTC in 3 weeks      Shashi Negrete MD  10/31/2019  5:01 PM

## 2019-10-31 NOTE — PATIENT INSTRUCTIONS
We are Moving!!  Dear Friends and Patients, We are excited to announce that we will be moving to a new nearby location in early December, 2019! The new address is:    [address], [facility] [address]    Please be on the lookout for additional information as it becomes available  Thank you for trusting us with your care and we look forward to continuing to serve you in the future

## 2019-11-07 ENCOUNTER — ULTRASOUND (OUTPATIENT)
Dept: PERINATAL CARE | Facility: OTHER | Age: 24
End: 2019-11-07
Payer: COMMERCIAL

## 2019-11-07 VITALS
WEIGHT: 156.4 LBS | SYSTOLIC BLOOD PRESSURE: 120 MMHG | HEIGHT: 61 IN | HEART RATE: 87 BPM | DIASTOLIC BLOOD PRESSURE: 58 MMHG | BODY MASS INDEX: 29.53 KG/M2

## 2019-11-07 DIAGNOSIS — Z36.3 ENCOUNTER FOR ANTENATAL SCREENING FOR MALFORMATION: Primary | ICD-10-CM

## 2019-11-07 DIAGNOSIS — Z3A.28 28 WEEKS GESTATION OF PREGNANCY: ICD-10-CM

## 2019-11-07 PROCEDURE — 76816 OB US FOLLOW-UP PER FETUS: CPT | Performed by: OBSTETRICS & GYNECOLOGY

## 2019-11-07 NOTE — LETTER
November 7, 2019     [facility], [address] [facility]  [address] [facility]    Patient: Chaparro Peres   YOB: 1995   Date of Visit: 11/7/2019       Dear Dr Rd Doyle: Thank you for referring Chaparro Peres to me for evaluation  Below are my notes for this consultation  If you have questions, please do not hesitate to call me  I look forward to following your patient along with you  Sincerely,        Noam Lee MD        CC: No Recipients  Noam Lee MD  11/7/2019  3:12 PM  Signed    Please refer to the [facility] ultrasound report in Ob Procedures for additional information regarding the visit to the [facility]  today    Noam Lee MD

## 2019-11-07 NOTE — PROGRESS NOTES
Please refer to the [facility] ultrasound report in Ob Procedures for additional information regarding the visit to the [facility]  today    Lee Olmedo MD

## 2019-11-07 NOTE — LETTER
November 7, 2019     Gem Rose MD  [address] [address]    Patient: Alon Ortiz   YOB: 1995   Date of Visit: 11/7/2019       Dear Dr Karla Hayes: Thank you for referring Alon Ortiz to me for evaluation  Below are my notes for this consultation  If you have questions, please do not hesitate to call me  I look forward to following your patient along with you  Sincerely,        Za Mitchell MD        CC: No Recipients  Za Mitchell MD  11/7/2019  3:07 PM  Sign at close encounter    Please refer to the [facility] ultrasound report in Ob Procedures for additional information regarding the visit to the [facility]  today    Za Mitchell MD

## 2019-11-20 ENCOUNTER — ROUTINE PRENATAL (OUTPATIENT)
Dept: OBGYN CLINIC | Facility: CLINIC | Age: 24
End: 2019-11-20

## 2019-11-20 VITALS — BODY MASS INDEX: 30 KG/M2 | WEIGHT: 158.8 LBS | SYSTOLIC BLOOD PRESSURE: 113 MMHG | DIASTOLIC BLOOD PRESSURE: 71 MMHG

## 2019-11-20 DIAGNOSIS — Z34.93 PRENATAL CARE IN THIRD TRIMESTER: ICD-10-CM

## 2019-11-20 DIAGNOSIS — Z3A.30 30 WEEKS GESTATION OF PREGNANCY: Primary | ICD-10-CM

## 2019-11-20 PROBLEM — B37.9 YEAST INFECTION: Status: RESOLVED | Noted: 2019-09-27 | Resolved: 2019-11-20

## 2019-11-20 PROCEDURE — 99215 OFFICE O/P EST HI 40 MIN: CPT | Performed by: NURSE PRACTITIONER

## 2019-11-20 NOTE — PATIENT INSTRUCTIONS
Complete blood work as direted  Call with vaginal bleeding, loss of fluid, regular contractions, decreased fetal movement, other needs or concerns  Return in 2 weeks      We are Moving!!  Dear Friends and Patients, We are excited to announce that we will be moving to a new nearby location in early December, 2019! The new address is:    [address], [address] [place] [address]    Please be on the lookout for additional information as it becomes available  Thank you for trusting us with your care and we look forward to continuing to serve you in the future

## 2019-11-20 NOTE — PROGRESS NOTES
Assessment & Plan  27 y o  Keven Dad at 30w1d presenting for routine prenatal visit  Aware of need for 28 week labs, plans to complete this week  28 week education provided, birth plan completed, all questions answered  Plans TDAP next visit    Problem List Items Addressed This Visit        Other    Prenatal care in third trimester    30 weeks gestation of pregnancy - Primary        ____________________________________________________________  Subjective  She is without complaint  She denies contractions, loss of fluid, or vaginal bleeding  She feels regular fetal movements      Objective  /71   Wt 72 kg (158 lb 12 8 oz)   LMP 04/23/2019 (Exact Date)   BMI 30 00 kg/m²     Fetal Heart Rate: 140    Patient's Active Problem List  Patient Active Problem List   Diagnosis    Prenatal care in third trimester    30 weeks gestation of pregnancy     Plan  Complete blood work as direted  Call with vaginal bleeding, loss of fluid, regular contractions, decreased fetal movement, other needs or concerns  Return in 2 weeks  Pt verbalized understanding of all discussed

## 2019-12-02 ENCOUNTER — APPOINTMENT (OUTPATIENT)
Dept: LAB | Facility: HOSPITAL | Age: 24
End: 2019-12-02
Attending: OBSTETRICS & GYNECOLOGY
Payer: COMMERCIAL

## 2019-12-02 DIAGNOSIS — Z3A.27 27 WEEKS GESTATION OF PREGNANCY: ICD-10-CM

## 2019-12-02 LAB
BASOPHILS # BLD AUTO: 0.05 THOUSANDS/ΜL (ref 0–0.1)
BASOPHILS NFR BLD AUTO: 1 % (ref 0–1)
EOSINOPHIL # BLD AUTO: 0.24 THOUSAND/ΜL (ref 0–0.61)
EOSINOPHIL NFR BLD AUTO: 4 % (ref 0–6)
ERYTHROCYTE [DISTWIDTH] IN BLOOD BY AUTOMATED COUNT: 15.3 % (ref 11.6–15.1)
GLUCOSE 1H P 50 G GLC PO SERPL-MCNC: 130 MG/DL
HCT VFR BLD AUTO: 36 % (ref 34.8–46.1)
HGB BLD-MCNC: 10.9 G/DL (ref 11.5–15.4)
IMM GRANULOCYTES # BLD AUTO: 0.05 THOUSAND/UL (ref 0–0.2)
IMM GRANULOCYTES NFR BLD AUTO: 1 % (ref 0–2)
LYMPHOCYTES # BLD AUTO: 1.59 THOUSANDS/ΜL (ref 0.6–4.47)
LYMPHOCYTES NFR BLD AUTO: 24 % (ref 14–44)
MCH RBC QN AUTO: 27.1 PG (ref 26.8–34.3)
MCHC RBC AUTO-ENTMCNC: 30.3 G/DL (ref 31.4–37.4)
MCV RBC AUTO: 90 FL (ref 82–98)
MONOCYTES # BLD AUTO: 0.38 THOUSAND/ΜL (ref 0.17–1.22)
MONOCYTES NFR BLD AUTO: 6 % (ref 4–12)
NEUTROPHILS # BLD AUTO: 4.29 THOUSANDS/ΜL (ref 1.85–7.62)
NEUTS SEG NFR BLD AUTO: 64 % (ref 43–75)
NRBC BLD AUTO-RTO: 0 /100 WBCS
PLATELET # BLD AUTO: 332 THOUSANDS/UL (ref 149–390)
PMV BLD AUTO: 9.7 FL (ref 8.9–12.7)
RBC # BLD AUTO: 4.02 MILLION/UL (ref 3.81–5.12)
WBC # BLD AUTO: 6.6 THOUSAND/UL (ref 4.31–10.16)

## 2019-12-02 PROCEDURE — 86592 SYPHILIS TEST NON-TREP QUAL: CPT

## 2019-12-02 PROCEDURE — 36415 COLL VENOUS BLD VENIPUNCTURE: CPT

## 2019-12-02 PROCEDURE — 82950 GLUCOSE TEST: CPT

## 2019-12-02 PROCEDURE — 85025 COMPLETE CBC W/AUTO DIFF WBC: CPT

## 2019-12-03 LAB — RPR SER QL: NORMAL

## 2019-12-04 ENCOUNTER — ROUTINE PRENATAL (OUTPATIENT)
Dept: OBGYN CLINIC | Facility: CLINIC | Age: 24
End: 2019-12-04

## 2019-12-04 VITALS
DIASTOLIC BLOOD PRESSURE: 69 MMHG | SYSTOLIC BLOOD PRESSURE: 103 MMHG | HEART RATE: 100 BPM | HEIGHT: 61 IN | BODY MASS INDEX: 30.21 KG/M2 | WEIGHT: 160 LBS

## 2019-12-04 DIAGNOSIS — J45.40 MODERATE PERSISTENT ASTHMA WITHOUT COMPLICATION: ICD-10-CM

## 2019-12-04 DIAGNOSIS — O99.013 ANEMIA DURING PREGNANCY IN THIRD TRIMESTER: ICD-10-CM

## 2019-12-04 DIAGNOSIS — Z3A.32 32 WEEKS GESTATION OF PREGNANCY: Primary | ICD-10-CM

## 2019-12-04 PROBLEM — O99.019 ANEMIA IN PREGNANCY: Status: ACTIVE | Noted: 2019-12-04

## 2019-12-04 PROCEDURE — 90715 TDAP VACCINE 7 YRS/> IM: CPT

## 2019-12-04 PROCEDURE — 90471 IMMUNIZATION ADMIN: CPT

## 2019-12-04 PROCEDURE — 99213 OFFICE O/P EST LOW 20 MIN: CPT | Performed by: OBSTETRICS & GYNECOLOGY

## 2019-12-04 RX ORDER — ALBUTEROL SULFATE 90 UG/1
2 AEROSOL, METERED RESPIRATORY (INHALATION) EVERY 6 HOURS PRN
Qty: 18 G | Refills: 2 | Status: SHIPPED | OUTPATIENT
Start: 2019-12-04 | End: 2019-12-04 | Stop reason: SDUPTHER

## 2019-12-04 RX ORDER — FERROUS SULFATE TAB EC 324 MG (65 MG FE EQUIVALENT) 324 (65 FE) MG
324 TABLET DELAYED RESPONSE ORAL
Qty: 30 TABLET | Refills: 3 | Status: SHIPPED | OUTPATIENT
Start: 2019-12-04

## 2019-12-04 RX ORDER — ALBUTEROL SULFATE 90 UG/1
2 AEROSOL, METERED RESPIRATORY (INHALATION) EVERY 6 HOURS PRN
Qty: 18 G | Refills: 2 | Status: SHIPPED | OUTPATIENT
Start: 2019-12-04 | End: 2020-03-28 | Stop reason: SDUPTHER

## 2019-12-04 NOTE — PROGRESS NOTES
Jacquie Galloway presents today for routine OB visit at [address] [place] [address]  /69   Pulse 100   Ht 5' 1" (1 549 m)   Wt 72 6 kg (160 lb)   LMP 04/23/2019 (Exact Date)   Breastfeeding? No   BMI 30 23 kg/m²   Urine dip: negaitve  She reports no complaints  Denies uterine contractions  Denies vaginal bleeding or leaking of fluid  Reports adequate fetal movement of at least 10 movements in 2 hours once daily  Third trimester bloodwork anemic- rx iron  Vaccinations: tdap today  Scheduled for next ultrasound none  Reviewed premature labor precautions and fetal kick counts  Advised to continue prenatal vitamins and return in 2 weeks      G1 Problems (from 06/26/19 to present)     Problem Noted Resolved    Anemia in pregnancy 12/4/2019 by Helena Baez MD No    30 weeks gestation of pregnancy 9/27/2019 by TERE Moore No    Fetal ultrasound 7/3/2019 by TERE Harris 9/9/2019 by TERE Moore    Overview Signed 7/3/2019  8:22 AM by Bob Harris [address]     7/1/19 (9w6d) EDC confirmed

## 2019-12-18 ENCOUNTER — ROUTINE PRENATAL (OUTPATIENT)
Dept: OBGYN CLINIC | Facility: CLINIC | Age: 24
End: 2019-12-18

## 2019-12-18 VITALS — SYSTOLIC BLOOD PRESSURE: 101 MMHG | BODY MASS INDEX: 30.91 KG/M2 | WEIGHT: 163.6 LBS | DIASTOLIC BLOOD PRESSURE: 67 MMHG

## 2019-12-18 DIAGNOSIS — Z3A.34 34 WEEKS GESTATION OF PREGNANCY: Primary | ICD-10-CM

## 2019-12-18 DIAGNOSIS — Z34.93 PRENATAL CARE IN THIRD TRIMESTER: ICD-10-CM

## 2019-12-18 PROCEDURE — 99215 OFFICE O/P EST HI 40 MIN: CPT | Performed by: NURSE PRACTITIONER

## 2019-12-18 NOTE — PROGRESS NOTES
Assessment & Plan  27 y o  Johnathan Ruffin at 34w1d presenting for routine prenatal visit  Problem List Items Addressed This Visit        Other    34 weeks gestation of pregnancy - Primary    Prenatal care in third trimester        ____________________________________________________________  Subjective  She is without complaint  She denies contractions, loss of fluid, or vaginal bleeding  She feels regular fetal movements      Objective  /67   Wt 74 2 kg (163 lb 9 6 oz)   LMP 04/23/2019 (Exact Date)   BMI 30 91 kg/m²          Patient's Active Problem List  Patient Active Problem List   Diagnosis    34 weeks gestation of pregnancy    Anemia in pregnancy    Prenatal care in third trimester     Plan  To call with vaginal bleeding, loss of fluid, regular contractions, decreased fetal movement, other needs or concerns    Call OB Resident on call at [phone] with needs  Return in 2 weeks

## 2019-12-18 NOTE — PATIENT INSTRUCTIONS
The Third Trimester  (28-42 weeks)  YOUR BABY   * your baby sucks its thumb now! * your baby can hear voices and respond to touch   so talk to him or her!!   * your babys brain grows and develops most in the last 2 months of pregnancy   * babys head and bones are soft and flexible so they can fit through the birth canal   * babys movements change towards the end of pregnancy because there is less room for kicking and stretching in your belly   * babys lungs are not fully developed and completely ready to breathe on their own until the last 3-4 weeks before your due date    YOUR BODY   * your belly is growing a lot now   * it may become more difficult to sleep well at night or to be as active as you usually are   * you may sweat more than usual   * you will become more off-balancebe careful not to fall!!   * you may develop hemorrhoids (which can be painful and make it difficult to sit down)   * the last two months of pregnancy can become very uncomfortablewith backaches, headaches, and heartburn   * you can start to have contractions  as long as they are irregular and less than 5 per hour, this is a normal part of your body getting ready to have a baby   * your cervix may start to thin out and open upto get ready for delivery   * you may find yourself needing to pee very often  because baby is pressing on your bladder so much   * you may get out of breathe more quickly than usual      FETAL KICK COUNTS    In the third trimester (after 28 weeks gestation) you should be performing fetal kick counts every day  Your baby should move at least 10 times in 2 hours during an active time, once a day  Choose atime of day when your baby is most active  Try to do this around the same time each day  Get into a comfortable position and then write down the time your baby first moves  Count each movement until the baby moves 10 times  These movements include kicks, punches, nudges, flutters, or rolls    This can take anywhere from 5 minutes to 2 hours  Write down the time you feel the baby's 10th movement  If 2 hours has passed and your baby has not moved at least 10 times, you should CALL THE OFFICE RIGHT AWAY  [phone]  PREMATURE LABOR     When to call [phone]:  * I need to call immediately if I have even a small amount of LIQUID leaking from my vagina, with or without contractions  * I need to call if I am BLEEDING from my vagina  * I need to call if I am feeling CRAMPING that continues after drinking 2-3 glasses of water and lying down on my side for one hour and that feels like I am having a period  * I need to call if I feel CONTRACTIONS  more than 4 times in an hour that feels like the baby is balling up even after I try drinking 2-3 glasses of water and lying down on my side for an hour  * I need to call if I notice a change in my vaginal DISCHARGE  * I need to call if I am feeling PELVIC PRESSURE  that feels like the baby is pushing down into my vagina and lasts more than an hour  * I need to call if I have LOW BACKACHE which is new and near my tailbone  It may either come and go several times during an hour or stay there constantly  PRE-ECLAMPSIA     What is it? Pre-eclampsia is a serious disease that can occur during pregnancy related to high blood pressures  It can happen to any woman  Why should I care? Women who develop pre-eclampsia have serious risks which can include seizures, stroke, organ damage, premature birth of their baby  In the very worst cases, it can cause death of the mother and/or their baby  What should I pay attention to?    Signs and symptoms of pre-eclampsia can include:   * Severe swelling of face or hands    * A headache that will not go away even after you have taken Tylenol   * Seeing spots or changes in eyesight    * Pain in the upper abdomen or shoulder    * New nausea and vomiting (in the second half of pregnancy)    * Sudden weight gain    * Difficulty breathing     What should I do? If you experience any of the above symptoms of pre-eclampsia, contact your OB provider  Finding pre-eclampsia early is important for you and your baby  Call us at [phone]  [facility] BREASTFEEDING     BENEFITS FOR BABIES   * stronger immune systems (less allergies, eczema, asthma, and childhood cancers)   * less diarrhea and constipation or other GI diseases   * fewer colds and ear infections   * better vision and teeth (fewer cavities)   * improves IQ   * lower rates of diabetes and obesity in childhood     BENEFITS FOR MOMS   * promotes faster weight loss after delivery   * lower risk for postpartum depression   * lower risk for breast, uterine, and ovarian cancers   * lower risk for osteoporosis developing with age   * easier than formula - is always right with you, clean, and the right temperature   * less expensive than formulaits FREE !!!!     KEYS TO SUCCESSFUL BREASTFEEDING   * keep baby skin-to-skin until after first feeding event   * keep baby in your room with you during your hospital stay after delivery   * avoid any bottle feedings (unless medically necessary)   * limit the use of pacifiers and swaddling   * ask for help if you are having any issueslactation consultants (who specialize in breastfeeding) are available to help you   * a healthy diet for momeating a variety of foods and portions in moderation    THINGS YOU SHOULD KNOW ABOUT BREASTFEEDING   * most medications are considered compatible with breastfeeding by the [address] [place] [address] [employer] of Pediatrics, but you should check with your health care provider or lactation consultant prior to taking a new medicationjust to be sure it is safe   * alcohol (beer, wine, liquor) can be passed from mother to baby through breast milkan occasional, social drink is deemed acceptable by the [employer] of [place] [employer]   more than that should be avoided   * breastfeeding is NOT an effective method of birth control   * nicotine (in cigarettes) can pass from mother to baby through breast milk   however, for mothers who smoke, it is still healthier to breastfeed than use formula   * caffeine should be limited to 1-3 cups per dayincludes coffee, soda, energy drinks         PERINEAL / VAGINAL MASSAGE    What can I do now to decrease my chances of tearing during delivery? Massaging around the vaginal opening by you (or your partner), either antepartum (before birth) or during the second stage of labor, can reduce the likelihood of perineal tearing during childbirth  Likewise, the use of warm packs held on the perineum during the pushing stage of labor can reduce the severity of your tear  This will happen during the pushing stage of labor  At home, you can also help reduce the chances of injury that may occur during the birth of your child through perineal massage  When should I do this? Starting around or shortly after 34 weeks of pregnancy, you or your partner should provide 5-10 minutes of vaginal massage 1-4 times per week  How? Use either almond, coconut, or olive oil and water mixture on 1 or 2 fingers (depending on comfort)  Insert finger(s) 3-5cm into the vagina  Apply sweeping downward/sideward pressure from 3 to 9 o'clock for 5-10 minutes, 1-4 times per week  WARNING SIGNS DURING PREGNANCY  Call our office at [phone] if you experience any of the followin  Vaginal bleeding  2  Sharp abdominal pain that does not go away  3  Fever (more than 100 4 and is not relieved by Tylenol)  4  Persistent vomiting lasting greater than 24 hours  5  Chest pain   6  Pain or burning when you urinate  7  Severe headache that doesn't resolve with Tylenol  8  Blurred vision or seeing spots in your vision  9  Sudden swelling of your face or hands  10  Redness, swelling or pain in a leg  11  A sudden weight gain in just a few days  12   Decrease in your baby's movement (after 28 weeks or the 6th month of pregnancy)  13  A loss of watery fluid from your vagina - can be a gush, a trickle or continuous wetness  14  After 20 weeks of pregnancy, rhythmic cramping (greater than 4 per hour) or menstrual like low/pelvic pain          VACCINES IN PREGNANCY    TDAP  Whopping cough (or pertusSsis) can be serious for anyone, but for your , it can be life-threatning  Up to 20 babies die each year in the [place]  Due to whopping cough  About half of babies younger than 3year old who get whopping cough need treatment in the hospital   The younger the baby is when he or she gets whopping cough, the more likely he or she will need to be treated in a hospital   When you receive the whopping cough vaccine (Tdap) during your pregnancy, your body will create protective antibodies and pass some of them to your baby before birth  These antibodies can help protect your baby from getting whopping cough until they are old enough to be vaccinated themselves (usually around 7 months of age)  INFLUENZA  Changes in your immune, heart, and lung functions during pregnancy make you more likely to get seriously ill from the flu  Catching the flu also increases your chances for serious problems for your developing baby, including premature labor and delivery  It is recommended that all women who are pregnant during flu season should receive an influenza vaccine

## 2020-01-02 ENCOUNTER — ROUTINE PRENATAL (OUTPATIENT)
Dept: OBGYN CLINIC | Facility: CLINIC | Age: 25
End: 2020-01-02

## 2020-01-02 VITALS
SYSTOLIC BLOOD PRESSURE: 110 MMHG | DIASTOLIC BLOOD PRESSURE: 70 MMHG | HEIGHT: 61 IN | BODY MASS INDEX: 31.34 KG/M2 | WEIGHT: 166 LBS | HEART RATE: 101 BPM

## 2020-01-02 DIAGNOSIS — Z3A.36 36 WEEKS GESTATION OF PREGNANCY: Primary | ICD-10-CM

## 2020-01-02 PROCEDURE — 87653 STREP B DNA AMP PROBE: CPT | Performed by: OBSTETRICS & GYNECOLOGY

## 2020-01-02 PROCEDURE — PNV: Performed by: OBSTETRICS & GYNECOLOGY

## 2020-01-02 NOTE — PROGRESS NOTES
OB/GYN  PN Visit  Jacquie March  [id number]  2020  12:15 PM  Dr Una Wilkins MD    S: 27 y o  Kasi Close 36w2d here for PN visit  She has no obstetric complaints, including pelvic pain, contractions, vaginal bleeding, loss of fluid, or decreased fetal movement  O:  Vitals:    20 1135   BP: 110/70   Pulse: 101       Gen: no acute distress, nonlabored breathing  Fundal height: 35          Presentation: VERTEX    A/P:    Problem List Items Addressed This Visit     None      Visit Diagnoses     36 weeks gestation of pregnancy    -  Primary    Relevant Orders    Strep B DNA probe, amplification              D 27 y o  Kasi Close 36w2d  here for prenatal care without obstetric complaints today  In this visit we addressed:    IUP at 36 2 weeks  Prenatal panel 2019 HIV negative, RPR negative, Rubella immune, hep B negative  CL/GC negative 2019  Hgb Electrophoresis negative 2019  Last US  Vertex, EFW 1382(68%), placenta posterior   Normal 28 week labs  Tdap vaccine provided in 2018, influenza vaccine provided 10/31/2019  We collected GBS today  Vertex by bedside US    Anemia in pregnancy  Patient is taking iron supplementation  Last hemoglobin 10 9    Birth plan: , she would like Epidural in her labor  Breastfeeding: yes  Contraception: patient would like OCPs or depo-provera shot  In next appointment patient will let us know which method she prefers    Different options including LARCs  were discussed with her  Discussed  labor precautions and fetal kick counts    Return to clinic in 1 weeks    Patient evaluated with Dr Blade Diaz MD  2020  12:15 PM

## 2020-01-02 NOTE — PATIENT INSTRUCTIONS
LABOR PRECAUTIONS  Call our office at [phone] for any of the following:    * I need to call immediately I if I have even a small amount of LIQUID  leaking from my vagina, with or without contractions  * I need to call if I am BLEEDING an amount equal to or more than a period  A small amount of bloody vaginal discharge is normal at the end of the pregnancy  * I need to call if I am having CONTRACTIONS  every five minutes for at least an hour  I will need a watch in order to time them  I should time them from the beginning of one contraction until the beginning of the next one  * I need to call BEFORE  I go to the hospital    * I need to have a plan for TRANSPORTATION  to get to the hospital when I am in labor  Labor is generally not an emergency which requires an ambulance  FETAL KICK COUNTS    In the third trimester (after 28 weeks gestation) you should be performing fetal kick counts every day  Your baby should move at least 10 times in 2 hours during an active time, once a day  Choose atime of day when your baby is most active  Try to do this around the same time each day  Get into a comfortable position and then write down the time your baby first moves  Count each movement until the baby moves 10 times  These movements include kicks, punches, nudges, flutters, or rolls  This can take anywhere from 5 minutes to 2 hours  Write down the time you feel the baby's 10th movement  If 2 hours has passed and your baby has not moved at least 10 times, you should CALL THE OFFICE RIGHT AWAY  [phone]        PERINEAL / VAGINAL MASSAGE    What can I do now to decrease my chances of tearing during delivery? Massaging around the vaginal opening by you (or your partner), either antepartum (before birth) or during the second stage of labor, can reduce the likelihood of perineal tearing during childbirth    Likewise, the use of warm packs held on the perineum during the pushing stage of labor can reduce the severity of your tear  This will happen during the pushing stage of labor  At home, you can also help reduce the chances of injury that may occur during the birth of your child through perineal massage  When should I do this? Starting around or shortly after 34 weeks of pregnancy, you or your partner should provide 5-10 minutes of vaginal massage 1-4 times per week  How? Use either almond, coconut, or olive oil and water mixture on 1 or 2 fingers (depending on comfort)  Insert finger(s) 3-5cm into the vagina  Apply sweeping downward/sideward pressure from 3 to 9 o'clock for 5-10 minutes, 1-4 times per week  GROUP B STREP    Group B Strep (GBS) is a common vaginal bacteria  Approximately 25% of women normally have GBS bacteria present in the vagina  It is NOT a sexually-transmitted infection  In fact, it is not an infection AT ALL! It is just a normal vaginal bacteria for many women  However, the GBS bacteria can be dangerous to a  baby if the baby is exposed to that particular bacteria during labor and birth AND develops an infection from it  The likelihood of a  GBS infection for a woman who has GBS bacteria in the vagina is about 1%-2%  But if it does occur, a baby could become severely ill  For this reason, we do a vaginal culture (Q-tip swab of the vagina and rectum) for ALL pregnant women at approximately 36 weeks of pregnancy  If the culture shows that there is GBS bacteria present, it is NOT a reason to panic! Because in this situation we will give this woman antibiotics through her IV while she is in labor  When a mother is treated with antibiotics during labor and delivery, her baby ALMOST NEVER becomes infected with GBS bacteria

## 2020-01-03 LAB — GP B STREP DNA SPEC QL NAA+PROBE: ABNORMAL

## 2020-01-15 ENCOUNTER — ROUTINE PRENATAL (OUTPATIENT)
Dept: OBGYN CLINIC | Facility: CLINIC | Age: 25
End: 2020-01-15

## 2020-01-15 VITALS
BODY MASS INDEX: 32.1 KG/M2 | SYSTOLIC BLOOD PRESSURE: 125 MMHG | HEART RATE: 96 BPM | DIASTOLIC BLOOD PRESSURE: 78 MMHG | WEIGHT: 170 LBS | HEIGHT: 61 IN

## 2020-01-15 DIAGNOSIS — Z3A.38 38 WEEKS GESTATION OF PREGNANCY: Primary | ICD-10-CM

## 2020-01-15 PROBLEM — B95.1 POSITIVE GBS TEST: Status: ACTIVE | Noted: 2020-01-15

## 2020-01-15 PROCEDURE — 99213 OFFICE O/P EST LOW 20 MIN: CPT | Performed by: OBSTETRICS & GYNECOLOGY

## 2020-01-15 NOTE — PROGRESS NOTES
OB/GYN  PN Visit  Jacquie Molina  [id number]  1/15/2020  1:28 PM  Cliff Griffin MD    S: 27 y o  [place] Bl[place] 38w1d here for PN visit  Reports occasional contractions at night  Denies LOF or VB  Reports good fetal movement  Without complaint today  O:  Vitals:    01/15/20 1325   BP: 125/78   Pulse: 96       Gen: no acute distress, nonlabored breathing, pleasant demeanor  OB exam completed: fundal height 27 cm,  bpm, Vertex on TAUS    A/P:  #1  38w1d GESTATION  S/p Flu and Tdap  Labor precautions and how to get a hold of OB on call reviewed  Fetal kick counts reviewed  RTC in 1 week    #2  GBS positive- reviewed with patient  Will need PCN in labor, patient denies PCN allergy    #3  Anemia  Last Hg 10 9 on 19   Continue Fe supplementation    #4  Asthma- Well controlled  Albuterol PRN    #5  Contraception  Reviewed appropriate pregnancy spacing with at least 18 months in between pregnancies to reduce risk of  delivery and SGA  We discussed postpartum forms of contraception including LARCs (hormonal and non hormonal IUD, implant) Depo provera, POPs, condoms and natural planning methods     At this time patient is interested in POPs with exclusive breast feeding    Future Appointments   Date Time Provider Flaco Campo   1/15/2020  1:30 PM OBGYN PGY-3 RESIDENT [address] MD Carol Ann  1/15/2020  1:28 PM

## 2020-01-15 NOTE — PATIENT INSTRUCTIONS
Pregnancy at 28 to [address]:   You are considered full term at the beginning of 37 weeks  Your breathing may be easier if your baby has moved down into a head-down position  You may need to urinate more often because the baby may be pressing on your bladder  You may also feel more discomfort and get tired easily  DISCHARGE INSTRUCTIONS:   Seek care immediately if:   · You develop a severe headache that does not go away  · You have new or increased vision changes, such as blurred or spotted vision  · You have new or increased swelling in your face or hands  · You have vaginal spotting or bleeding  · Your water broke or you feel warm water gushing or trickling from your vagina  Contact your healthcare provider if:   · You have more than 5 contractions in 1 hour  · You notice any changes in your baby's movements  · You have abdominal cramps, pressure, or tightening  · You have a change in vaginal discharge  · You have chills or a fever  · You have vaginal itching, burning, or pain  · You have yellow, green, white, or foul-smelling vaginal discharge  · You have pain or burning when you urinate, less urine than usual, or pink or bloody urine  · You have questions or concerns about your condition or care  How to care for yourself at this stage of your pregnancy:   · Eat a variety of healthy foods  Healthy foods include fruits, vegetables, whole-grain breads, low-fat dairy foods, beans, lean meats, and fish  Drink liquids as directed  Ask how much liquid to drink each day and which liquids are best for you  Limit caffeine to less than 200 milligrams each day  Limit your intake of fish to 2 servings each week  Choose fish low in mercury such as canned light tuna, shrimp, salmon, cod, or tilapia  Do not  eat fish high in mercury such as swordfish, tilefish, blu mackerel, and shark  · Take prenatal vitamins as directed    Your need for certain vitamins and minerals, such as folic acid, increases during pregnancy  Prenatal vitamins provide some of the extra vitamins and minerals you need  Prenatal vitamins may also help to decrease the risk of certain birth defects  · Rest as needed  Put your feet up if you have swelling in your ankles and feet  · Do not smoke  If you smoke, it is never too late to quit  Smoking increases your risk of a miscarriage and other health problems during your pregnancy  Smoking can cause your baby to be born too early or weigh less at birth  Ask your healthcare provider for information if you need help quitting  · Do not drink alcohol  Alcohol passes from your body to your baby through the placenta  It can affect your baby's brain development and cause fetal alcohol syndrome (FAS)  FAS is a group of conditions that causes mental, behavior, and growth problems  · Talk to your healthcare provider before you take any medicines  Many medicines may harm your baby if you take them when you are pregnant  Do not take any medicines, vitamins, herbs, or supplements without first talking to your healthcare provider  Never use illegal or street drugs (such as marijuana or cocaine) while you are pregnant  · Talk to your healthcare provider before you travel  You may not be able to travel in an airplane after 36 weeks  He may also recommend that you avoid long road trips  Safety tips:   · Avoid hot tubs and saunas  Do not use a hot tub or sauna while you are pregnant, especially during your first trimester  Hot tubs and saunas may raise your baby's temperature and increase the risk of birth defects  · Avoid toxoplasmosis  This is an infection caused by eating raw meat or being around infected cat feces  It can cause birth defects, miscarriages, and other problems  Wash your hands after you touch raw meat  Make sure any meat is well-cooked before you eat it  Avoid raw eggs and unpasteurized milk   Use gloves or ask someone else to clean your cat's litter box while you are pregnant  · Ask your healthcare provider about travel  The most comfortable time to travel is during the second trimester  Ask your healthcare provider if you can travel after 36 weeks  You may not be able to travel in an airplane after 36 weeks  He may also recommend that you avoid long road trips  Changes that are happening with your baby:  By 38 weeks, your baby may weigh between 6 and 9 pounds  Your baby may be about 14 inches long from the top of the head to the rump (baby's bottom)  Your baby hears well enough to know your voice  As your baby gets larger, you may feel fewer kicks and more stretching and rolling  Your baby may move into a head-down position  Your baby will also rest lower in your abdomen  What you need to know about prenatal care: Your healthcare provider will check your blood pressure and weight  You may also need the following:  · A urine test  may also be done to check for sugar and protein  These can be signs of gestational diabetes or infection  Protein in your urine may also be a sign of preeclampsia  Preeclampsia is a condition that can develop during week 20 or later of your pregnancy  It causes high blood pressure, and it can cause problems with your kidneys and other organs  · A blood test  may be done to check for anemia (low iron level)  · A Tdap vaccine  may be recommended by your healthcare provider  · A group B strep test  is a test that is done to check for group B strep infection  Group B strep is a type of bacteria that may be found in the vagina or rectum  It can be passed to your baby during delivery if you have it  Your healthcare provider will take swab your vagina or rectum and send the sample to the lab for tests  · Fundal height  is a measurement of your uterus to check your baby's growth  This number is usually the same as the number of weeks that you have been pregnant   Your healthcare provider may also check your baby's position  · Your baby's heart rate  will be checked  © 2017 [address] Information is for End User's use only and may not be sold, redistributed or otherwise used for commercial purposes  All illustrations and images included in CareNotes® are the copyrighted property of [employer] M [employer]  or Bhupendra Shafer  The above information is an  only  It is not intended as medical advice for individual conditions or treatments  Talk to your doctor, nurse or pharmacist before following any medical regimen to see if it is safe and effective for you

## 2020-01-22 ENCOUNTER — ROUTINE PRENATAL (OUTPATIENT)
Dept: OBGYN CLINIC | Facility: CLINIC | Age: 25
End: 2020-01-22

## 2020-01-22 VITALS
SYSTOLIC BLOOD PRESSURE: 120 MMHG | DIASTOLIC BLOOD PRESSURE: 77 MMHG | WEIGHT: 177 LBS | HEIGHT: 61 IN | HEART RATE: 100 BPM | BODY MASS INDEX: 33.42 KG/M2

## 2020-01-22 DIAGNOSIS — Z3A.39 39 WEEKS GESTATION OF PREGNANCY: Primary | ICD-10-CM

## 2020-01-22 PROBLEM — Z3A.38 38 WEEKS GESTATION OF PREGNANCY: Status: RESOLVED | Noted: 2019-09-27 | Resolved: 2020-01-22

## 2020-01-22 PROCEDURE — 99213 OFFICE O/P EST LOW 20 MIN: CPT | Performed by: OBSTETRICS & GYNECOLOGY

## 2020-01-22 NOTE — PROGRESS NOTES
OB/GYN  PN Visit  Jacquie Ernst  [id number]  2020  1:07 PM  Saul Dueñas MD    S: 27 y o   39w1d here for PN visit  Reports occasional contractions, but none regular  Denies any LOF or VB  Reports excellent fetal movement  Patient declines cervical check or membrane sweeping today which was offered  I in addition offered her elective induction but she would like to hold off until next week and see how she does  Denies any other complaints today  O:  Vitals:    20 1253   BP: 120/77   Pulse: 100       Gen: no acute distress, nonlabored breathing, pleasant demeanor  OB exam completed: fundal height 38cm, FHTs 130 bpm    A/P:  #1  39w1d GESTATION  S/p Flu and Tdap  Will set up for induction next week- discussed recommended delivery by 41 weeks  Labor precautions and how to get a hold of OB on call reviewed  Fetal kick counts reviewed  RTC in 1 week     #2  GBS positive  Will need PCN in labor, patient denies PCN allergy     #3  Anemia  Last Hg 10 9 on 19   Continue Fe supplementation     #4  Asthma- Well controlled  Albuterol PRN     #5   Contraception  Desires [place] postpartum with exclusive breast feeding    Future Appointments   Date Time Provider Flaco Campo   2020  1:15 PM OBGYN PGY-3 RESIDENT [address] MD Carol Ann  2020  1:07 PM

## 2020-01-22 NOTE — PATIENT INSTRUCTIONS
Pregnancy at 44 to 40 Weeks   [address] [place], [place] [address] :   You are now getting close to your due date  Your due date is just an estimate of when your baby will be born  Your baby may be born before or after your due date  Your breathing may be easier if your baby has moved down into a head-down position  You may need to urinate more often because the baby may be pressing on your bladder  You may also feel more discomfort and tire easily  You may also be having trouble sleeping  DISCHARGE INSTRUCTIONS:   Seek care immediately if:   · You develop a severe headache that does not go away  · You have new or increased vision changes, such as blurred or spotted vision  · You have new or increased swelling in your face or hands  · You have vaginal spotting or bleeding  · Your water broke or you feel warm water gushing or trickling from your vagina  Contact your healthcare provider if:   · You have more than 5 contractions in 1 hour  · You notice any changes in your baby's movements  · You have abdominal cramps, pressure, or tightening  · You have a change in vaginal discharge  · You have chills or a fever  · You have vaginal itching, burning, or pain  · You have yellow, green, white, or foul-smelling vaginal discharge  · You have pain or burning when you urinate, less urine than usual, or pink or bloody urine  · You have questions or concerns about your condition or care  How to care for yourself at this stage of your pregnancy:   · Eat a variety of healthy foods  Healthy foods include fruits, vegetables, whole-grain breads, low-fat dairy foods, beans, lean meats, and fish  Drink liquids as directed  Ask how much liquid to drink each day and which liquids are best for you  Limit caffeine to less than 200 milligrams each day  Limit your intake of fish to 2 servings each week  Choose fish low in mercury such as canned light tuna, shrimp, crab, salmon, cod, or tilapia   Do not  eat fish high in mercury such as swordfish, tilefish, blu mackerel, and shark  · Take prenatal vitamins as directed  Your need for certain vitamins and minerals, such as folic acid, increases during pregnancy  Prenatal vitamins provide some of the extra vitamins and minerals you need  Prenatal vitamins may also help to decrease the risk of certain birth defects  · Rest as needed  Put your feet up if you have swelling in your ankles and feet  · Do not smoke  If you smoke, it is never too late to quit  Smoking increases your risk of a miscarriage and other health problems during your pregnancy  Smoking can cause your baby to be born too early or weigh less at birth  Ask your healthcare provider for information if you need help quitting  · Do not drink alcohol  Alcohol passes from your body to your baby through the placenta  It can affect your baby's brain development and cause fetal alcohol syndrome (FAS)  FAS is a group of conditions that causes mental, behavior, and growth problems  · Talk to your healthcare provider before you take any medicines  Many medicines may harm your baby if you take them when you are pregnant  Do not take any medicines, vitamins, herbs, or supplements without first talking to your healthcare provider  Never use illegal or street drugs (such as marijuana or cocaine) while you are pregnant  · Talk to your healthcare provider before you travel  You may not be able to travel in an airplane after 36 weeks  He may also recommend that you avoid long road trips  Safety tips:   · Avoid hot tubs and saunas  Do not use a hot tub or sauna while you are pregnant, especially during your first trimester  Hot tubs and saunas may raise your baby's temperature and increase the risk of birth defects  · Avoid toxoplasmosis  This is an infection caused by eating raw meat or being around infected cat feces  It can cause birth defects, miscarriages, and other problems   Wash your hands after you touch raw meat  Make sure any meat is well-cooked before you eat it  Avoid raw eggs and unpasteurized milk  Use gloves or ask someone else to clean your cat's litter box while you are pregnant  · Ask your healthcare provider about travel  The most comfortable time to travel is during the second trimester  Ask your healthcare provider if you can travel after 36 weeks  You may not be able to travel in an airplane after 36 weeks  He may also recommend that you avoid long road trips  Changes that are happening with your baby: Your baby is ready to be born  At birth, your baby may weigh about 6 to 9 pounds and be about 19 to 21 inches long  Your baby may be in a head-down position  Your baby will also rest lower in your abdomen  What you need to know about prenatal care: Your healthcare provider will check your blood pressure and weight  You may also need the following:  · A urine test  may also be done to check for sugar and protein  These can be signs of gestational diabetes or infection  Protein in your urine may also be a sign of preeclampsia  Preeclampsia is a condition that can develop during week 20 or later of your pregnancy  It causes high blood pressure, and it can cause problems with your kidneys and other organs  · Your baby's heart rate  will be checked  © 2017 [address] Information is for End User's use only and may not be sold, redistributed or otherwise used for commercial purposes  All illustrations and images included in CareNotes® are the copyrighted property of [employer] M [employer] Inc  or Bhupendra Shafer  The above information is an  only  It is not intended as medical advice for individual conditions or treatments  Talk to your doctor, nurse or pharmacist before following any medical regimen to see if it is safe and effective for you

## 2020-01-28 ENCOUNTER — ANESTHESIA (INPATIENT)
Dept: LABOR AND DELIVERY | Facility: HOSPITAL | Age: 25
DRG: 540 | End: 2020-01-28
Payer: COMMERCIAL

## 2020-01-28 ENCOUNTER — HOSPITAL ENCOUNTER (INPATIENT)
Facility: HOSPITAL | Age: 25
LOS: 4 days | Discharge: HOME | DRG: 540 | End: 2020-02-01
Attending: OBSTETRICS & GYNECOLOGY | Admitting: OBSTETRICS & GYNECOLOGY
Payer: COMMERCIAL

## 2020-01-28 DIAGNOSIS — Z98.891 STATUS POST PRIMARY LOW TRANSVERSE CESAREAN SECTION: ICD-10-CM

## 2020-01-28 DIAGNOSIS — Z30.9 CONTRACEPTION MANAGEMENT: ICD-10-CM

## 2020-01-28 DIAGNOSIS — Z3A.40 40 WEEKS GESTATION OF PREGNANCY: Primary | ICD-10-CM

## 2020-01-28 LAB
ABO GROUP BLD: NORMAL
BLD GP AB SCN SERPL QL: NEGATIVE
ERYTHROCYTE [DISTWIDTH] IN BLOOD BY AUTOMATED COUNT: 15.9 % (ref 11.6–15.1)
HCT VFR BLD AUTO: 41.5 % (ref 34.8–46.1)
HGB BLD-MCNC: 13.2 G/DL (ref 11.5–15.4)
MCH RBC QN AUTO: 27.7 PG (ref 26.8–34.3)
MCHC RBC AUTO-ENTMCNC: 31.8 G/DL (ref 31.4–37.4)
MCV RBC AUTO: 87 FL (ref 82–98)
PLATELET # BLD AUTO: 302 THOUSANDS/UL (ref 149–390)
PMV BLD AUTO: 10.1 FL (ref 8.9–12.7)
RBC # BLD AUTO: 4.76 MILLION/UL (ref 3.81–5.12)
RH BLD: POSITIVE
SPECIMEN EXPIRATION DATE: NORMAL
WBC # BLD AUTO: 7.09 THOUSAND/UL (ref 4.31–10.16)

## 2020-01-28 PROCEDURE — 86592 SYPHILIS TEST NON-TREP QUAL: CPT | Performed by: OBSTETRICS & GYNECOLOGY

## 2020-01-28 PROCEDURE — 86901 BLOOD TYPING SEROLOGIC RH(D): CPT | Performed by: OBSTETRICS & GYNECOLOGY

## 2020-01-28 PROCEDURE — 85027 COMPLETE CBC AUTOMATED: CPT | Performed by: OBSTETRICS & GYNECOLOGY

## 2020-01-28 PROCEDURE — 99024 POSTOP FOLLOW-UP VISIT: CPT | Performed by: OBSTETRICS & GYNECOLOGY

## 2020-01-28 PROCEDURE — 99203 OFFICE O/P NEW LOW 30 MIN: CPT

## 2020-01-28 PROCEDURE — 4A1HXCZ MONITORING OF PRODUCTS OF CONCEPTION, CARDIAC RATE, EXTERNAL APPROACH: ICD-10-PCS | Performed by: OBSTETRICS & GYNECOLOGY

## 2020-01-28 PROCEDURE — 3E0P7VZ INTRODUCTION OF HORMONE INTO FEMALE REPRODUCTIVE, VIA NATURAL OR ARTIFICIAL OPENING: ICD-10-PCS | Performed by: OBSTETRICS & GYNECOLOGY

## 2020-01-28 PROCEDURE — 86850 RBC ANTIBODY SCREEN: CPT | Performed by: OBSTETRICS & GYNECOLOGY

## 2020-01-28 PROCEDURE — 86900 BLOOD TYPING SEROLOGIC ABO: CPT | Performed by: OBSTETRICS & GYNECOLOGY

## 2020-01-28 RX ORDER — PROMETHAZINE HYDROCHLORIDE 25 MG/ML
25 INJECTION, SOLUTION INTRAMUSCULAR; INTRAVENOUS ONCE
Status: COMPLETED | OUTPATIENT
Start: 2020-01-28 | End: 2020-01-28

## 2020-01-28 RX ORDER — ROPIVACAINE HYDROCHLORIDE 2 MG/ML
INJECTION, SOLUTION EPIDURAL; INFILTRATION; PERINEURAL AS NEEDED
Status: DISCONTINUED | OUTPATIENT
Start: 2020-01-28 | End: 2020-01-29 | Stop reason: SURG

## 2020-01-28 RX ORDER — LIDOCAINE HYDROCHLORIDE AND EPINEPHRINE 15; 5 MG/ML; UG/ML
INJECTION, SOLUTION EPIDURAL
Status: COMPLETED | OUTPATIENT
Start: 2020-01-28 | End: 2020-01-28

## 2020-01-28 RX ORDER — OXYTOCIN/RINGER'S LACTATE 30/500 ML
1-30 PLASTIC BAG, INJECTION (ML) INTRAVENOUS
Status: DISCONTINUED | OUTPATIENT
Start: 2020-01-28 | End: 2020-01-28

## 2020-01-28 RX ORDER — BUTORPHANOL TARTRATE 1 MG/ML
1 INJECTION, SOLUTION INTRAMUSCULAR; INTRAVENOUS ONCE
Status: COMPLETED | OUTPATIENT
Start: 2020-01-28 | End: 2020-01-28

## 2020-01-28 RX ORDER — OXYTOCIN/RINGER'S LACTATE 30/500 ML
1-30 PLASTIC BAG, INJECTION (ML) INTRAVENOUS
Status: DISCONTINUED | OUTPATIENT
Start: 2020-01-28 | End: 2020-02-01 | Stop reason: HOSPADM

## 2020-01-28 RX ORDER — ROPIVACAINE HYDROCHLORIDE 5 MG/ML
INJECTION, SOLUTION EPIDURAL; INFILTRATION; PERINEURAL AS NEEDED
Status: DISCONTINUED | OUTPATIENT
Start: 2020-01-28 | End: 2020-01-29 | Stop reason: SURG

## 2020-01-28 RX ORDER — ROPIVACAINE HYDROCHLORIDE 2 MG/ML
INJECTION, SOLUTION EPIDURAL; INFILTRATION; PERINEURAL CONTINUOUS PRN
Status: DISCONTINUED | OUTPATIENT
Start: 2020-01-28 | End: 2020-01-29 | Stop reason: SURG

## 2020-01-28 RX ORDER — ROPIVACAINE HYDROCHLORIDE 2 MG/ML
INJECTION, SOLUTION EPIDURAL; INFILTRATION; PERINEURAL
Status: COMPLETED
Start: 2020-01-28 | End: 2020-01-28

## 2020-01-28 RX ORDER — ONDANSETRON 2 MG/ML
4 INJECTION INTRAMUSCULAR; INTRAVENOUS EVERY 6 HOURS PRN
Status: DISCONTINUED | OUTPATIENT
Start: 2020-01-28 | End: 2020-01-29

## 2020-01-28 RX ORDER — SODIUM CHLORIDE, SODIUM LACTATE, POTASSIUM CHLORIDE, CALCIUM CHLORIDE 600; 310; 30; 20 MG/100ML; MG/100ML; MG/100ML; MG/100ML
125 INJECTION, SOLUTION INTRAVENOUS CONTINUOUS
Status: DISCONTINUED | OUTPATIENT
Start: 2020-01-28 | End: 2020-02-01 | Stop reason: HOSPADM

## 2020-01-28 RX ADMIN — MISOPROSTOL 50 MCG: 100 TABLET ORAL at 08:00

## 2020-01-28 RX ADMIN — ROPIVACAINE HYDROCHLORIDE 8 ML/HR: 2 INJECTION, SOLUTION EPIDURAL; INFILTRATION at 15:55

## 2020-01-28 RX ADMIN — ROPIVACAINE HYDROCHLORIDE 4 ML: 2 INJECTION, SOLUTION EPIDURAL; INFILTRATION at 21:22

## 2020-01-28 RX ADMIN — BUTORPHANOL TARTRATE 1 MG: 1 INJECTION, SOLUTION INTRAMUSCULAR; INTRAVENOUS at 11:21

## 2020-01-28 RX ADMIN — SODIUM CHLORIDE 2.5 MILLION UNITS: 9 INJECTION, SOLUTION INTRAVENOUS at 13:08

## 2020-01-28 RX ADMIN — Medication 2 MILLI-UNITS/MIN: at 11:22

## 2020-01-28 RX ADMIN — SODIUM CHLORIDE, SODIUM LACTATE, POTASSIUM CHLORIDE, AND CALCIUM CHLORIDE 500 ML/HR: .6; .31; .03; .02 INJECTION, SOLUTION INTRAVENOUS at 19:15

## 2020-01-28 RX ADMIN — ROPIVACAINE HYDROCHLORIDE: 2 INJECTION, SOLUTION EPIDURAL; INFILTRATION at 15:59

## 2020-01-28 RX ADMIN — LIDOCAINE HYDROCHLORIDE AND EPINEPHRINE 5 ML: 15; 5 INJECTION, SOLUTION EPIDURAL at 15:51

## 2020-01-28 RX ADMIN — SODIUM CHLORIDE, SODIUM LACTATE, POTASSIUM CHLORIDE, AND CALCIUM CHLORIDE 500 ML/HR: .6; .31; .03; .02 INJECTION, SOLUTION INTRAVENOUS at 22:00

## 2020-01-28 RX ADMIN — SODIUM CHLORIDE 2.5 MILLION UNITS: 9 INJECTION, SOLUTION INTRAVENOUS at 21:37

## 2020-01-28 RX ADMIN — SODIUM CHLORIDE 5 MILLION UNITS: 0.9 INJECTION, SOLUTION INTRAVENOUS at 07:46

## 2020-01-28 RX ADMIN — ROPIVACAINE HYDROCHLORIDE 4 ML: 5 INJECTION, SOLUTION EPIDURAL; INFILTRATION; PERINEURAL at 21:22

## 2020-01-28 RX ADMIN — SODIUM CHLORIDE, SODIUM LACTATE, POTASSIUM CHLORIDE, AND CALCIUM CHLORIDE 125 ML/HR: .6; .31; .03; .02 INJECTION, SOLUTION INTRAVENOUS at 07:46

## 2020-01-28 RX ADMIN — ROPIVACAINE HYDROCHLORIDE: 2 INJECTION, SOLUTION EPIDURAL; INFILTRATION at 22:39

## 2020-01-28 RX ADMIN — SODIUM CHLORIDE, SODIUM LACTATE, POTASSIUM CHLORIDE, AND CALCIUM CHLORIDE 500 ML/HR: .6; .31; .03; .02 INJECTION, SOLUTION INTRAVENOUS at 21:10

## 2020-01-28 RX ADMIN — PROMETHAZINE HYDROCHLORIDE 25 MG: 25 INJECTION INTRAMUSCULAR; INTRAVENOUS at 11:21

## 2020-01-28 RX ADMIN — SODIUM CHLORIDE, SODIUM LACTATE, POTASSIUM CHLORIDE, AND CALCIUM CHLORIDE 125 ML/HR: .6; .31; .03; .02 INJECTION, SOLUTION INTRAVENOUS at 23:20

## 2020-01-28 RX ADMIN — SODIUM CHLORIDE 2.5 MILLION UNITS: 9 INJECTION, SOLUTION INTRAVENOUS at 16:55

## 2020-01-28 RX ADMIN — BUTORPHANOL TARTRATE 1 MG: 1 INJECTION, SOLUTION INTRAMUSCULAR; INTRAVENOUS at 14:03

## 2020-01-28 NOTE — OB LABOR/OXYTOCIN SAFETY PROGRESS
Oxytocin Safety Progress Check Note - Jacquie Soto 27 y o  female MRN: [id number]    Unit/Bed#: L&D 321-01 Encounter: [id number]    Dose (trevon-units/min) Oxytocin: 8 trevon-units/min  Contraction Frequency (minutes): 2  Contraction Quality: Mild  Tachysystole: No   Dilation: 3        Effacement (%): 60  Station: -2  Baseline Rate: 135 bpm  Fetal Heart Rate: 135 BPM  FHR Category: Category II  Oxytocin Safety Progress Check: Safety check completed          Notes/comments:   Evaluated patient after comfortable with epidural  Several late decelerations after epidural placement  More comfortable now with epidural  Category II tracing for late decelerations, now Category I  Contractions q1-4 minutes  Found to be changed to 3cm dilated  Will continue to monitor      Discussed with Dr Thea Belcher MD 1/28/2020 4:18 PM

## 2020-01-28 NOTE — PLAN OF CARE
Problem: Labor & Delivery  Goal: Manages discomfort  Description  Assess and monitor for signs and symptoms of discomfort  Assess patient's pain level regularly and per hospital policy  Administer medications as ordered  Support use of nonpharmacological methods to help control pain such as distraction, imagery, relaxation, and application of heat and cold  Collaborate with interdisciplinary team and patient to determine appropriate pain management plan  1  Include patient in decisions related to comfort  2  Offer non-pharmacological pain management interventions  3  Report ineffective pain management to physician  Outcome: Progressing  Goal: Patient vital signs are stable  Description  1  Assess vital signs - vaginal delivery    Outcome: Progressing     Problem: PAIN - ADULT  Goal: Verbalizes/displays adequate comfort level or baseline comfort level  Description  Interventions:  - Encourage patient to monitor pain and request assistance  - Assess pain using appropriate pain scale  - Administer analgesics based on type and severity of pain and evaluate response  - Implement non-pharmacological measures as appropriate and evaluate response  - Consider cultural and social influences on pain and pain management  - Notify physician/advanced practitioner if interventions unsuccessful or patient reports new pain  Outcome: Progressing     Problem: INFECTION - ADULT  Goal: Absence or prevention of progression during hospitalization  Description  INTERVENTIONS:  - Assess and monitor for signs and symptoms of infection  - Monitor lab/diagnostic results  - Monitor all insertion sites, i e  indwelling lines, tubes, and drains  - Monitor endotracheal if appropriate and nasal secretions for changes in amount and color  - [place] appropriate cooling/warming therapies per order  - Administer medications as ordered  - Instruct and encourage patient and family to use good hand hygiene technique  - Identify and instruct in appropriate isolation precautions for identified infection/condition  Outcome: Progressing  Goal: Absence of fever/infection during neutropenic period  Description  INTERVENTIONS:  - Monitor WBC    Outcome: Progressing     Problem: SAFETY ADULT  Goal: Patient will remain free of falls  Description  INTERVENTIONS:  - Assess patient frequently for physical needs  -  Identify cognitive and physical deficits and behaviors that affect risk of falls    -  [place] fall precautions as indicated by assessment   - Educate patient/family on patient safety including physical limitations  - Instruct patient to call for assistance with activity based on assessment  - Modify environment to reduce risk of injury  - Consider OT/PT consult to assist with strengthening/mobility  Outcome: Progressing  Goal: Maintain or return to baseline ADL function  Description  INTERVENTIONS:  -  Assess patient's ability to carry out ADLs; assess patient's baseline for ADL function and identify physical deficits which impact ability to perform ADLs (bathing, care of mouth/teeth, toileting, grooming, dressing, etc )  - Assess/evaluate cause of self-care deficits   - Assess range of motion  - Assess patient's mobility; develop plan if impaired  - Assess patient's need for assistive devices and provide as appropriate  - Encourage maximum independence but intervene and supervise when necessary  - Involve family in performance of ADLs  - Assess for home care needs following discharge   - Consider OT consult to assist with ADL evaluation and planning for discharge  - Provide patient education as appropriate  Outcome: Progressing  Goal: Maintain or return mobility status to optimal level  Description  INTERVENTIONS:  - Assess patient's baseline mobility status (ambulation, transfers, stairs, etc )    - Identify cognitive and physical deficits and behaviors that affect mobility  - Identify mobility aids required to assist with transfers and/or ambulation (gait belt, sit-to-stand, lift, walker, cane, etc )  - [place] fall precautions as indicated by assessment  - Record patient progress and toleration of activity level on Mobility SBAR; progress patient to next Phase/Stage  - Instruct patient to call for assistance with activity based on assessment  - Consider rehabilitation consult to assist with strengthening/weightbearing, etc   Outcome: Progressing     Problem: Knowledge Deficit  Goal: Patient/family/caregiver demonstrates understanding of disease process, treatment plan, medications, and discharge instructions  Description  Complete learning assessment and assess knowledge base    Interventions:  - Provide teaching at level of understanding  - Provide teaching via preferred learning methods  Outcome: Progressing     Problem: DISCHARGE PLANNING  Goal: Discharge to home or other facility with appropriate resources  Description  INTERVENTIONS:  - Identify barriers to discharge w/patient and caregiver  - Arrange for needed discharge resources and transportation as appropriate  - Identify discharge learning needs (meds, wound care, etc )  - Arrange for interpretive services to assist at discharge as needed  - Refer to Case Management Department for coordinating discharge planning if the patient needs post-hospital services based on physician/advanced practitioner order or complex needs related to functional status, cognitive ability, or social support system  Outcome: Progressing

## 2020-01-28 NOTE — OB LABOR/OXYTOCIN SAFETY PROGRESS
Labor Progress Note - Jacquie Molina 27 y o  female MRN: [id number]    Unit/Bed#: L&D 321-01 Encounter: [id number]       Contraction Frequency (minutes): 2-3  Contraction Quality: Mild  Tachysystole: No   Dilation: 1        Effacement (%): 50  Station: -3  Baseline Rate: 135 bpm  Fetal Heart Rate: 135 BPM  FHR Category: Category I             Notes/comments:   Pt s/p 1 dose PO cytotec, SVE noted above  CTX q2-5mins and patient reports them as painful  Will start pitocin at this time  Pt declines any analgesia       pAril Cho MD 1/28/2020 11:04 AM

## 2020-01-28 NOTE — OB LABOR/OXYTOCIN SAFETY PROGRESS
Oxytocin Safety Progress Check Note - Jacquie Galloway 27 y o  female MRN: [id number]    Unit/Bed#: L&D 321-01 Encounter: [id number]    Dose (trevon-units/min) Oxytocin: 4 trevon-units/min  Contraction Frequency (minutes): 2  Contraction Quality: Not applicable  Tachysystole: No   Dilation: (deferred)        Effacement (%): (deferred)  Station: (deferred)  Baseline Rate: 125 bpm  Fetal Heart Rate: 135 BPM  FHR Category: Category I  Oxytocin Safety Progress Check: Safety check completed          Notes/comments:   Pt states the stadol and phenergan helped her sleep, but the contraction pain is starting to come back  Still reports leaking of fluid  SVE deferred at this time  Pt advised that if she wants more IV meds she can have another dose in 1 hr  Continue pitocin titration       Cedric Sanderson MD 1/28/2020 1:15 PM

## 2020-01-28 NOTE — ANESTHESIA PREPROCEDURE EVALUATION
Review of Systems/Medical History  Patient summary reviewed        Cardiovascular  Negative cardio ROS    Pulmonary  Negative pulmonary ROS Asthma , well controlled/ stable ,        GI/Hepatic  Negative GI/hepatic ROS          Negative  ROS        Endo/Other  Negative endo/other ROS      GYN  Negative gynecology ROS          Hematology  Negative hematology ROS Anemia ,     Musculoskeletal  Negative musculoskeletal ROS        Neurology  Negative neurology ROS      Psychology   Negative psychology ROS              Physical Exam    Airway    Mallampati score: II  TM Distance: >3 FB  Neck ROM: full     Dental       Cardiovascular  Comment: Negative ROS, Rhythm: regular, Rate: normal, Cardiovascular exam normal    Pulmonary  Pulmonary exam normal Breath sounds clear to auscultation,     Other Findings        Anesthesia Plan  ASA Score- 2     Anesthesia Type- epidural with ASA Monitors  Additional Monitors:   Airway Plan:         Plan Factors-    Induction-     Postoperative Plan-     Informed Consent- Anesthetic plan and risks discussed with patient

## 2020-01-28 NOTE — OB LABOR/OXYTOCIN SAFETY PROGRESS
Oxytocin Safety Progress Check Note - Jacquie Soto 27 y o  female MRN: [id number]    Unit/Bed#: L&D 321-01 Encounter: [id number]    Dose (trevon-units/min) Oxytocin: 12 trevon-units/min  Contraction Frequency (minutes): 1-3  Contraction Quality: Mild  Tachysystole: No   Dilation: 4        Effacement (%): 80  Station: -2  Baseline Rate: 130 bpm  Fetal Heart Rate: 135 BPM  FHR Category: Category I  Oxytocin Safety Progress Check: Safety check completed          Notes/comments:     Patient is comfortable with an epidural  Cervical exam is now 4/80/-2  Pitocin is at 12 trevon-units/min  Patient has had an intermittent Category 2 tracing  Oxygen has been placed on the patient and she has been turned side to side  Tracing has improved  FHT: Cat 2: 140/moderate/accelerations, no decelerations  [place]: q2 minutes  Plan: Continue to titrate  Place peanut ball         Cedric Jaramillo MD 1/28/2020 6:56 PM

## 2020-01-28 NOTE — H&P
History & Physical - OB/GYN   Jacquie Shaista Dasilva 27 y o  female MRN: [id number]  Unit/Bed#: L&D 329-02 Encounter: [id number]    She is a patient of [facility]    Chief complaint:  "I broke my water"    HPI: 27 y o  Sho Pacer at 40w0d by LMP who presents with prelabor rupture of membranes at midnight  Contractions:  no  Fetal movement:  yes  Vaginal bleeding:   no  Leaking of fluid:  yes    Pregnancy Complications:  Patient Active Problem List   Diagnosis    Anemia in pregnancy    Prenatal care in third trimester    Positive GBS test     PMH:  Past Medical History:   Diagnosis Date    38 weeks gestation of pregnancy 2019    Asthma     Albuterol prn    Varicella     as child and immunized       PSH:  Past Surgical History:   Procedure Laterality Date    NO PAST SURGERIES         Social Hx:  Smoking: none  Alcohol use in pregnancy: none   Illicit drug use: none   Safe at home: yes     OB Hx:  OB History    Para Term  AB Living   1 0 0 0 0 0   SAB TAB Ectopic Multiple Live Births   0 0 0 0 0      # Outcome Date GA Lbr Antolin/2nd Weight Sex Delivery Anes PTL Lv   1 Current                Meds:  No current facility-administered medications on file prior to encounter  Current Outpatient Medications on File Prior to Encounter   Medication Sig Dispense Refill    albuterol (PROVENTIL HFA,VENTOLIN HFA) 90 mcg/act inhaler Inhale 2 puffs every 6 (six) hours as needed for wheezing 18 g 2    ferrous sulfate 324 (65 Fe) mg Take 1 tablet (324 mg total) by mouth daily before breakfast 30 tablet 3    Prenatal Vit-Fe Fumarate-FA (PRENATAL VITAMIN) 27-0 8 MG TABS Take 1 tablet by mouth daily 90 tablet 4       Allergies:   Allergies   Allergen Reactions    Shellfish-Derived Products        Labs:  Blood type: O+  Antibody: negative  Group B strep: positive  HIV: negative  Hepatitis B: negative  RPR: NR  Rubella: Immune  Varicella Immune  1 hour Glucose: 130    Physical Exam:  /75 (BP Location: Left arm) Pulse 96   Temp 98 3 °F (36 8 °C) (Oral)   Resp 18   LMP 04/23/2019 (Exact Date)   SpO2 98%     Physical Exam   Constitutional: She is oriented to person, place, and time  She appears well-developed and well-nourished  No distress  Cardiovascular: Normal rate, regular rhythm and normal heart sounds  Exam reveals no gallop and no friction rub  No murmur heard  Pulmonary/Chest: Effort normal and breath sounds normal  No stridor  No respiratory distress  She has no wheezes  She has no rales  Abdominal: Soft  She exhibits distension  There is no tenderness  Musculoskeletal: Normal range of motion  She exhibits no edema, tenderness or deformity  Neurological: She is alert and oriented to person, place, and time  Skin: Skin is warm and dry  She is not diaphoretic  No erythema  Psychiatric: She has a normal mood and affect  Her behavior is normal  Judgment and thought content normal      Estimated Fetal Weight: 7 5 lbs  Presentation: vertex by US on admission     SVE: 0 / 40% / -3  FHT:  135 / Moderate 6 - 25 bpm / accelerations, no decelerations  [place]: irritability     Membranes: prelabor rupture at midnight     Assessment: 27 y o  Yue Gonzales at 40w0d by LMP who presents with prelabor rupture of membranes at midnight  Plan:   1  Admit to L&D  2  CBC, RPR, type and screen  3  GBS positive: PCN to be started   4  LR @125cc/hour   5  Start cytotec since patient has been ruptured for 7 hours   Epidural upon request    Discussed with Dr Shaan Infante

## 2020-01-28 NOTE — ANESTHESIA PROCEDURE NOTES
Epidural Block    Patient location during procedure: OB  Start time: 1/28/2020 3:46 PM  Reason for block: procedure for pain and at surgeon's request  Staffing  Anesthesiologist: Steven Curry DO  Performed: anesthesiologist   Preanesthetic Checklist  Completed: patient identified, site marked, surgical consent, pre-op evaluation, timeout performed, IV checked, risks and benefits discussed and monitors and equipment checked  Epidural  Patient position: sitting  Prep: ChloraPrep  Patient monitoring: cardiac monitor and frequent blood pressure checks  Approach: midline  Location: lumbar (1-5)  Injection technique: TIM air  Needle  Needle type: Tuohy   Needle gauge: 18 G  Catheter type: side hole  Catheter size: 20 G  Test dose: negativelidocaine 1 5% with epinephrine 1:200,000 test dose, 5 mL  Assessment  Sensory level: [id number] aspiration for CSF, negative aspiration for heme and no paresthesia on injection  patient tolerated the procedure well with no immediate complications

## 2020-01-29 PROBLEM — Z98.891 STATUS POST PRIMARY LOW TRANSVERSE CESAREAN SECTION: Status: ACTIVE | Noted: 2020-01-29

## 2020-01-29 LAB
BASE EXCESS BLDCOA CALC-SCNC: -5.5 MMOL/L (ref 3–11)
BASE EXCESS BLDCOV CALC-SCNC: -1.7 MMOL/L (ref 1–9)
ERYTHROCYTE [DISTWIDTH] IN BLOOD BY AUTOMATED COUNT: 16.3 % (ref 11.6–15.1)
HCO3 BLDCOA-SCNC: 21.5 MMOL/L (ref 17.3–27.3)
HCO3 BLDCOV-SCNC: 24.3 MMOL/L (ref 12.2–28.6)
HCT VFR BLD AUTO: 36.3 % (ref 34.8–46.1)
HGB BLD-MCNC: 11.3 G/DL (ref 11.5–15.4)
MCH RBC QN AUTO: 27.8 PG (ref 26.8–34.3)
MCHC RBC AUTO-ENTMCNC: 31.1 G/DL (ref 31.4–37.4)
MCV RBC AUTO: 89 FL (ref 82–98)
O2 CT VFR BLDCOA CALC: 7.3 ML/DL
OXYHGB MFR BLDCOA: 34.9 %
OXYHGB MFR BLDCOV: 52.1 %
PCO2 BLDCOA: 47.6 MM[HG] (ref 30–60)
PCO2 BLDCOV: 45.7 MM HG (ref 27–43)
PH BLDCOA: 7.27 [PH] (ref 7.23–7.43)
PH BLDCOV: 7.34 [PH] (ref 7.19–7.49)
PLATELET # BLD AUTO: 237 THOUSANDS/UL (ref 149–390)
PMV BLD AUTO: 10.2 FL (ref 8.9–12.7)
PO2 BLDCOA: 31.2 MM HG (ref 5–25)
PO2 BLDCOV: 22.7 MM HG (ref 15–45)
RBC # BLD AUTO: 4.06 MILLION/UL (ref 3.81–5.12)
RPR SER QL: NORMAL
SAO2 % BLDCOV: 10.9 ML/DL
WBC # BLD AUTO: 12.79 THOUSAND/UL (ref 4.31–10.16)

## 2020-01-29 PROCEDURE — 82805 BLOOD GASES W/O2 SATURATION: CPT | Performed by: OBSTETRICS & GYNECOLOGY

## 2020-01-29 PROCEDURE — 85027 COMPLETE CBC AUTOMATED: CPT | Performed by: OBSTETRICS & GYNECOLOGY

## 2020-01-29 PROCEDURE — 59514 CESAREAN DELIVERY ONLY: CPT | Performed by: OBSTETRICS & GYNECOLOGY

## 2020-01-29 RX ORDER — ONDANSETRON 2 MG/ML
4 INJECTION INTRAMUSCULAR; INTRAVENOUS EVERY 4 HOURS PRN
Status: ACTIVE | OUTPATIENT
Start: 2020-01-29 | End: 2020-01-30

## 2020-01-29 RX ORDER — IBUPROFEN 600 MG/1
600 TABLET ORAL EVERY 6 HOURS PRN
Status: DISCONTINUED | OUTPATIENT
Start: 2020-01-29 | End: 2020-01-30 | Stop reason: SDUPTHER

## 2020-01-29 RX ORDER — OXYCODONE HYDROCHLORIDE AND ACETAMINOPHEN 5; 325 MG/1; MG/1
2 TABLET ORAL EVERY 4 HOURS PRN
Status: DISCONTINUED | OUTPATIENT
Start: 2020-01-30 | End: 2020-02-01 | Stop reason: HOSPADM

## 2020-01-29 RX ORDER — KETOROLAC TROMETHAMINE 30 MG/ML
INJECTION, SOLUTION INTRAMUSCULAR; INTRAVENOUS AS NEEDED
Status: DISCONTINUED | OUTPATIENT
Start: 2020-01-29 | End: 2020-01-29 | Stop reason: SURG

## 2020-01-29 RX ORDER — HYDROMORPHONE HCL/PF 1 MG/ML
0.5 SYRINGE (ML) INJECTION
Status: DISCONTINUED | OUTPATIENT
Start: 2020-01-29 | End: 2020-02-01 | Stop reason: HOSPADM

## 2020-01-29 RX ORDER — OXYCODONE HYDROCHLORIDE 5 MG/1
10 TABLET ORAL EVERY 4 HOURS PRN
Status: ACTIVE | OUTPATIENT
Start: 2020-01-29 | End: 2020-01-30

## 2020-01-29 RX ORDER — LIDOCAINE HYDROCHLORIDE AND EPINEPHRINE 20; 5 MG/ML; UG/ML
INJECTION, SOLUTION EPIDURAL; INFILTRATION; INTRACAUDAL; PERINEURAL AS NEEDED
Status: DISCONTINUED | OUTPATIENT
Start: 2020-01-29 | End: 2020-01-29 | Stop reason: SURG

## 2020-01-29 RX ORDER — DEXAMETHASONE SODIUM PHOSPHATE 4 MG/ML
4 INJECTION, SOLUTION INTRA-ARTICULAR; INTRALESIONAL; INTRAMUSCULAR; INTRAVENOUS; SOFT TISSUE ONCE AS NEEDED
Status: ACTIVE | OUTPATIENT
Start: 2020-01-29 | End: 2020-01-30

## 2020-01-29 RX ORDER — ONDANSETRON 2 MG/ML
4 INJECTION INTRAMUSCULAR; INTRAVENOUS ONCE AS NEEDED
Status: DISCONTINUED | OUTPATIENT
Start: 2020-01-29 | End: 2020-01-29 | Stop reason: HOSPADM

## 2020-01-29 RX ORDER — ONDANSETRON 2 MG/ML
4 INJECTION INTRAMUSCULAR; INTRAVENOUS EVERY 8 HOURS PRN
Status: DISCONTINUED | OUTPATIENT
Start: 2020-01-29 | End: 2020-01-29

## 2020-01-29 RX ORDER — ONDANSETRON 2 MG/ML
4 INJECTION INTRAMUSCULAR; INTRAVENOUS EVERY 8 HOURS PRN
Status: DISCONTINUED | OUTPATIENT
Start: 2020-01-30 | End: 2020-02-01 | Stop reason: HOSPADM

## 2020-01-29 RX ORDER — ONDANSETRON 2 MG/ML
INJECTION INTRAMUSCULAR; INTRAVENOUS AS NEEDED
Status: DISCONTINUED | OUTPATIENT
Start: 2020-01-29 | End: 2020-01-29 | Stop reason: SURG

## 2020-01-29 RX ORDER — METOCLOPRAMIDE HYDROCHLORIDE 5 MG/ML
5 INJECTION INTRAMUSCULAR; INTRAVENOUS EVERY 6 HOURS PRN
Status: ACTIVE | OUTPATIENT
Start: 2020-01-29 | End: 2020-01-30

## 2020-01-29 RX ORDER — ACETAMINOPHEN 325 MG/1
650 TABLET ORAL EVERY 6 HOURS PRN
Status: DISCONTINUED | OUTPATIENT
Start: 2020-01-29 | End: 2020-02-01 | Stop reason: HOSPADM

## 2020-01-29 RX ORDER — OXYCODONE HYDROCHLORIDE AND ACETAMINOPHEN 5; 325 MG/1; MG/1
1 TABLET ORAL EVERY 4 HOURS PRN
Status: DISCONTINUED | OUTPATIENT
Start: 2020-01-30 | End: 2020-02-01 | Stop reason: HOSPADM

## 2020-01-29 RX ORDER — FENTANYL CITRATE/PF 50 MCG/ML
50 SYRINGE (ML) INJECTION
Status: DISCONTINUED | OUTPATIENT
Start: 2020-01-29 | End: 2020-02-01 | Stop reason: HOSPADM

## 2020-01-29 RX ORDER — OXYCODONE HYDROCHLORIDE 5 MG/1
5 TABLET ORAL EVERY 4 HOURS PRN
Status: DISPENSED | OUTPATIENT
Start: 2020-01-29 | End: 2020-01-30

## 2020-01-29 RX ORDER — PANTOPRAZOLE SODIUM 40 MG/1
40 TABLET, DELAYED RELEASE ORAL
Status: DISCONTINUED | OUTPATIENT
Start: 2020-01-29 | End: 2020-02-01 | Stop reason: HOSPADM

## 2020-01-29 RX ORDER — OXYTOCIN/RINGER'S LACTATE 30/500 ML
62.5 PLASTIC BAG, INJECTION (ML) INTRAVENOUS CONTINUOUS
Status: ACTIVE | OUTPATIENT
Start: 2020-01-29 | End: 2020-01-29

## 2020-01-29 RX ORDER — MORPHINE SULFATE 0.5 MG/ML
INJECTION, SOLUTION EPIDURAL; INTRATHECAL; INTRAVENOUS
Status: COMPLETED
Start: 2020-01-29 | End: 2020-01-29

## 2020-01-29 RX ORDER — MORPHINE SULFATE 0.5 MG/ML
INJECTION, SOLUTION EPIDURAL; INTRATHECAL; INTRAVENOUS AS NEEDED
Status: DISCONTINUED | OUTPATIENT
Start: 2020-01-29 | End: 2020-01-29 | Stop reason: SURG

## 2020-01-29 RX ORDER — DIPHENHYDRAMINE HYDROCHLORIDE 50 MG/ML
25 INJECTION INTRAMUSCULAR; INTRAVENOUS EVERY 6 HOURS PRN
Status: DISPENSED | OUTPATIENT
Start: 2020-01-29 | End: 2020-01-30

## 2020-01-29 RX ORDER — CALCIUM CARBONATE 200(500)MG
1000 TABLET,CHEWABLE ORAL DAILY PRN
Status: DISCONTINUED | OUTPATIENT
Start: 2020-01-29 | End: 2020-02-01 | Stop reason: HOSPADM

## 2020-01-29 RX ORDER — CEFAZOLIN SODIUM 1 G/3ML
INJECTION, POWDER, FOR SOLUTION INTRAMUSCULAR; INTRAVENOUS AS NEEDED
Status: DISCONTINUED | OUTPATIENT
Start: 2020-01-29 | End: 2020-01-29 | Stop reason: SURG

## 2020-01-29 RX ORDER — KETOROLAC TROMETHAMINE 30 MG/ML
30 INJECTION, SOLUTION INTRAMUSCULAR; INTRAVENOUS EVERY 6 HOURS PRN
Status: DISPENSED | OUTPATIENT
Start: 2020-01-29 | End: 2020-01-30

## 2020-01-29 RX ORDER — CEFAZOLIN SODIUM 2 G/50ML
2000 SOLUTION INTRAVENOUS EVERY 8 HOURS
Status: DISCONTINUED | OUTPATIENT
Start: 2020-01-29 | End: 2020-01-29

## 2020-01-29 RX ORDER — NALOXONE HYDROCHLORIDE 0.4 MG/ML
0.1 INJECTION, SOLUTION INTRAMUSCULAR; INTRAVENOUS; SUBCUTANEOUS
Status: ACTIVE | OUTPATIENT
Start: 2020-01-29 | End: 2020-01-30

## 2020-01-29 RX ADMIN — OXYCODONE HYDROCHLORIDE 5 MG: 5 TABLET ORAL at 08:13

## 2020-01-29 RX ADMIN — CEFAZOLIN SODIUM 2000 MG: 1 INJECTION, POWDER, FOR SOLUTION INTRAMUSCULAR; INTRAVENOUS at 05:08

## 2020-01-29 RX ADMIN — PHENYLEPHRINE HYDROCHLORIDE 200 MCG: 10 INJECTION INTRAVENOUS at 06:00

## 2020-01-29 RX ADMIN — AZITHROMYCIN 500 MG: 500 INJECTION, POWDER, LYOPHILIZED, FOR SOLUTION INTRAVENOUS at 05:16

## 2020-01-29 RX ADMIN — MORPHINE SULFATE 2 MG: 0.5 INJECTION, SOLUTION EPIDURAL; INTRATHECAL; INTRAVENOUS at 06:11

## 2020-01-29 RX ADMIN — Medication: at 06:21

## 2020-01-29 RX ADMIN — Medication 250 MILLI-UNITS/MIN: at 05:39

## 2020-01-29 RX ADMIN — ONDANSETRON 4 MG: 2 INJECTION INTRAMUSCULAR; INTRAVENOUS at 05:43

## 2020-01-29 RX ADMIN — SODIUM CHLORIDE, SODIUM LACTATE, POTASSIUM CHLORIDE, AND CALCIUM CHLORIDE 125 ML/HR: .6; .31; .03; .02 INJECTION, SOLUTION INTRAVENOUS at 10:35

## 2020-01-29 RX ADMIN — OXYCODONE HYDROCHLORIDE 5 MG: 5 TABLET ORAL at 23:33

## 2020-01-29 RX ADMIN — LIDOCAINE HYDROCHLORIDE,EPINEPHRINE BITARTRATE 5 ML: 20; .005 INJECTION, SOLUTION EPIDURAL; INFILTRATION; INTRACAUDAL; PERINEURAL at 05:17

## 2020-01-29 RX ADMIN — DIPHENHYDRAMINE HYDROCHLORIDE 25 MG: 50 INJECTION INTRAMUSCULAR; INTRAVENOUS at 11:55

## 2020-01-29 RX ADMIN — LIDOCAINE HYDROCHLORIDE,EPINEPHRINE BITARTRATE 5 ML: 20; .005 INJECTION, SOLUTION EPIDURAL; INFILTRATION; INTRACAUDAL; PERINEURAL at 05:24

## 2020-01-29 RX ADMIN — ACETAMINOPHEN 650 MG: 325 TABLET ORAL at 23:34

## 2020-01-29 RX ADMIN — PHENYLEPHRINE HYDROCHLORIDE 200 MCG: 10 INJECTION INTRAVENOUS at 06:05

## 2020-01-29 RX ADMIN — LIDOCAINE HYDROCHLORIDE,EPINEPHRINE BITARTRATE 5 ML: 20; .005 INJECTION, SOLUTION EPIDURAL; INFILTRATION; INTRACAUDAL; PERINEURAL at 05:11

## 2020-01-29 RX ADMIN — LIDOCAINE HYDROCHLORIDE,EPINEPHRINE BITARTRATE 10 ML: 20; .005 INJECTION, SOLUTION EPIDURAL; INFILTRATION; INTRACAUDAL; PERINEURAL at 02:27

## 2020-01-29 RX ADMIN — PANTOPRAZOLE SODIUM 40 MG: 40 TABLET, DELAYED RELEASE ORAL at 08:13

## 2020-01-29 RX ADMIN — ACETAMINOPHEN 650 MG: 325 TABLET ORAL at 02:15

## 2020-01-29 RX ADMIN — OXYCODONE HYDROCHLORIDE 5 MG: 5 TABLET ORAL at 13:06

## 2020-01-29 RX ADMIN — CEFAZOLIN SODIUM 2000 MG: 2 SOLUTION INTRAVENOUS at 04:42

## 2020-01-29 RX ADMIN — KETOROLAC TROMETHAMINE 30 MG: 30 INJECTION, SOLUTION INTRAMUSCULAR at 06:24

## 2020-01-29 RX ADMIN — MORPHINE SULFATE 3 MG: 0.5 INJECTION, SOLUTION EPIDURAL; INTRATHECAL; INTRAVENOUS at 06:10

## 2020-01-29 NOTE — OP NOTE
OPERATIVE REPORT  PATIENT NAME: Jacquie Aguirre    :  1995  MRN: [id number]  Pt Location: [facility] L&D OR ROOM 01    SURGERY DATE: 2020    Surgeon(s) and Role:     * Marry Gonzalez DO - Primary     * Barry Rascon MD - Assisting     Preop Diagnosis: IUP @40w1d     Procedure(s) (LRB):   SECTION () (N/A)    Specimen(s):  ID Type Source Tests Collected by Time Destination   A :  Tissue (Placenta on Hold) OB Only Placenta PLACENTA IN STORAGE Rober Remy RN 2020 [id number]        Estimated Blood Loss: 700cc    Drains:  Urethral Catheter (Active)   Reasons to continue Urinary Catheter  Post-operative urological requirements 2020  5:05 AM   Site Assessment Clean;Skin intact 2020  5:05 AM   Collection Container Standard drainage bag 2020  5:05 AM   Number of days: 0     Anesthesia Type: Epidural     Operative Findings:  1  Viable female  at 5:38 with APGARs of 9 and 9 at 1 and 5 minutes  Fetus weighted 7lb 7oz  2  Normal intact placenta with centrally inserted 3VC expressed at 5:47    3  Normal uterus, bilateral tubes and ovaries  4  Blood gases:   Venous pH: 7 343   Venous base excess: -1 7    The patient was taken to the operating room  Epidural anesthesia was already  established and Ancef and Azithromycin were given for preoperative prophylaxis  The patient was then placed in the dorsal supine position with a left tilt of the hips  The patient was then prepped with betadine for vaginal prep and chloraprep for abdominal prep and draped in the usual sterile fashion for a Pfannenstial skin incision  A time out was performed to confirm correct patient and correct procedure  An incision was made in the skin with a surgical scalpel and sharp dissection was carried out over subsequent layers of tissue including the fascia, followed by the Bovie electrocautery for hemostasis    The fascia was incised at the midline and the fascial incision was extended bilaterally using the curved De La Fuente scissors  The superior edge of the fascial incision was grasped with Kocher clamps, tented up and the underlying rectus muscles were dissected off bluntly and sharply using the De La Fuente scissors  The rectus muscles were then divided at midline and the peritoneum was identified, tented up at its upper margin taking care to avoid the bladder, and then entered  The peritoneal incision was extended superiorly and inferiorly  The [place] was inserted and the vesicouterine peritoneum was identified, and gently pushed down  The uterine incision was extended cephalad and caudal using blunt dissection  The amniotic sac was entered and the amniotic fluid was noted to be clear   The surgeon's hand was placed into the uterine cavity  The fetal head was identified and elevated through the uterine incision with the assistance of fundal pressure  Fetal position was direct occiput posterior  With gentle traction, the shoulder was delivered, followed by the rest of the fetal body  There was no nuchal cord noted  On delivery the cord was doubly clamped and cut after delayed cord clamping  The infant was then passed off the table to the awaiting  staff  The  was noted to cry spontaneously and moved all extremities  Venous and arterial blood gas, cord blood, and portion of cord was obtained for analysis and routine blood testing  The placenta delivery was then sent to storage  Placenta was noted to be intact with a centrally inserted three-vessel cord  Oxytocin was administered by IV infusion to enhance uterine contraction  The uterus was cleared of all clots and remaining products of conception  The uterine incision was re approximated using a 0-Vicryl in a running locked fashion  A second vertical imbricating stitch with 0-Vicryl was applied  The uterine incision was examined and noted to be hemostatic    The posterior cul-de-sac was cleared of all clots and products of conception  The pericolic gutters were cleared of all clots  The uterine incision was once again reexamined and noted to be hemostatic  The peritoneum was re-approximated with 3-0 plain gut  The fascia was re approximated using 0-Vicryl in a running nonlocked fashion  The subcutaneous tissue was irrigated and cleared of all clots and debris  Good hemostasis was noted with Bovie electrocautery  The subcutaneous  tissue was reapproximated with 3-0 plain gut  The skin incision was closed using 4-0 Monocryl  Good hemostasis was noted  Patient tolerated the procedure well  All needle, sponge, and instrument counts were noted to be correct x 2 at the end of the procedure  Patient was transferred to the recovery room in stable condition  Dr Salvador Reece was present for the procedure      Patient Disposition:  PACU     SIGNATURE: Ford Guzman MD  DATE: January 29, 2020  TIME: 6:43 AM

## 2020-01-29 NOTE — OB LABOR/OXYTOCIN SAFETY PROGRESS
Oxytocin Safety Progress Check Note - Jacquie Florencio Case 27 y o  female MRN: [id number]    Unit/Bed#: L&D 321-01 Encounter: [id number]    Dose (trevon-units/min) Oxytocin: 4 trevon-units/min  Contraction Frequency (minutes): 2-3(irritability at times)  Contraction Quality: Strong  Tachysystole: No   Dilation: 10        Effacement (%): 80  Station: 1  Baseline Rate: 165 bpm  Fetal Heart Rate: 135 BPM  FHR Category: Category I  Oxytocin Safety Progress Check: Safety check completed          Notes/comments:     Anesthesia came and bolused her epidural  In the meantime, the SOD and I were in another delivery  Upon re-evaluation, fetal position was ROP  Attempts were made to rotate fetal position to BETSY  However these were unsuccessful  Pushing was then attempted, however the patient stated that she was too tired to push  She was given the choice to have a  section or keep pushing  After 20 minutes of discussions with her family, she decided to have a  section        Flo Soares MD 2020 3:53 AM

## 2020-01-29 NOTE — OB LABOR/OXYTOCIN SAFETY PROGRESS
Oxytocin Safety Progress Check Note - Jacquie Wooten 27 y o  female MRN: [id number]    Unit/Bed#: L&D 321-01 Encounter: [id number]    Dose (trevon-units/min) Oxytocin: 4 trevon-units/min  Contraction Frequency (minutes): 2 5-3  Contraction Quality: Moderate  Tachysystole: No   Dilation: 10        Effacement (%): 80  Station: 1  Baseline Rate: 155 bpm  Fetal Heart Rate: 135 BPM  FHR Category: Category I  Oxytocin Safety Progress Check: Safety check completed          Notes/comments:     Patietn is feeling increasing rectal pressure  Cervical exam is 10/100/+1     FHT: Cat 2: 145/moderate/accelerations, variable decelerations  [place]: q2-3 minutes  Plan: prepare for delivery     Juventino De La Torre MD 1/29/2020 12:13 AM

## 2020-01-29 NOTE — LACTATION NOTE
This note was copied from a baby's chart  CONSULT - LACTATION  Baby Girl (Jacquei) Denman Habermann 0 days female MRN: [id number]    [facility] NURSERY Room / Bed: L&D 314(N)/L&D 314(N) Encounter: [id number]    Maternal Information     MOTHER:  Brooke Libman  Maternal Age: 27 y o    OB History: #: 1, Date: 20, Sex: Female, Weight: 3380 g (7 lb 7 2 oz), GA: 40w1d, Delivery: , Low Transverse, Apgar1: 5, Apgar5: 9, Living: Living, Birth Comments: Dr Doreen Chester present for delivery in OR   Previouse breast reduction surgery? No    Lactation history:   Has patient previously breast fed: How long had patient previously breast fed:     Previous breast feeding complications:       Past Surgical History:   Procedure Laterality Date    NO PAST SURGERIES         Birth information:  YOB: 2018   Time of birth: 5:38 AM   Sex: female   Delivery type: , Low Transverse   Birth Weight: 3380 g (7 lb 7 2 oz)   Percent of Weight Change: 0%     Gestational Age: 44w3d   [unfilled]    Assessment     Breast and nipple assessment: normal assessment     Assessment: normal assessment    Feeding assessment: feeding well  LATCH:  Latch: Grasps breast, tongue down, lips flanged, rhythmic sucking   Audible Swallowing: A few with stimulation   Type of Nipple: Everted (After stimulation)   Comfort (Breast/Nipple): Filling, red/small blisters/bruises, mild/moderate discomfort   Hold (Positioning): Partial assist, teach one side, mother does other, staff holds   LATCH Score: 7          Feeding recommendations:  breast feed on demand     Met with mother  Provided mother with Ready, Set, Baby booklet  Discussed Skin to Skin contact an benefits to mom and baby  Talked about the delay of the first bath until baby has adjusted  Spoke about the benefits of rooming in  Feeding on cue and what that means for recognizing infant's hunger   Avoidance of pacifiers for the first month discussed  Talked about exclusive breastfeeding for the first 6 months  Positioning and latch reviewed as well as showing images of other feeding positions  Discussed the properties of a good latch in any position  Reviewed hand/manual expression  Discussed s/s that baby is getting enough milk and some s/s that breastfeeding dyad may need further help  Gave information on common concerns, what to expect the first few weeks after delivery, preparing for other caregivers, and how partners can help  Resources for support also provided  Family supportive at bedside  Information on hand expression given  Discussed benefits of knowing how to manually express breast including stimulating milk supply, softening nipple for latch and evacuating breast in the event of engorgement  Worked on positioning infant up at chest level and starting to feed infant with nose arriving at the nipple  Then, using areolar compression to achieve a deep latch that is comfortable and exchanges optimum amounts of milk  Deep latch and stimulate to suck on left breast using football hold  Burped  Placed on right breast using football hold  A few short latches  Finished with relaxed tone  Encoraged MOB  to call for assistance, questions and concerns  Extension number for inpatient lactation support provided      Liz Wilcox RN 1/29/2020 3:06 PM

## 2020-01-29 NOTE — OB LABOR/OXYTOCIN SAFETY PROGRESS
Oxytocin Safety Progress Check Note - Jacquie Gonzalez 27 y o  female MRN: [id number]    Unit/Bed#: L&D 321-01 Encounter: [id number]    Dose (trevon-units/min) Oxytocin: 12 trevon-units/min  Contraction Frequency (minutes): 1-2  Contraction Quality: Mild  Tachysystole: No   Dilation: 5        Effacement (%): 80  Station: -2  Baseline Rate: 130 bpm  Fetal Heart Rate: 135 BPM  FHR Category: Category I  Oxytocin Safety Progress Check: Safety check completed          Notes/comments:     I went to evaluate the patient as she was having regular variable decelerations  The patient has oxygen in place, she was moved from side to side  Cervical exam is 5/80/-2  Patient is making steady cervical  Change  Pitocin was reduced  FHT improved to Category 1 tracing while I was in the room  Will continue to monitor  FHT: Cat 2: 135/moderate/accelerations,  Variable decelerations  [place]: q2-3 minutes  Plan: Continue to monitor   May place IUPC and FSE if necessary      Jessee Gloria MD 1/28/2020 7:57 PM

## 2020-01-29 NOTE — DISCHARGE SUMMARY
[facility] Discharge Summary  Curt Vásquez 27 y o  female MRN: [id number]    Unit/Bed#: LD OR  Encounter: [id number]    Admission Date: 2020   Discharge Date: 2020    Admitting Attending: Dr Aashish Umana  Delivering Attending: Dr Layne Baumann  Discharging Attending: Aashish Umana    Admitting Diagnosis:  Encounter for full-term uncomplicated delivery [V17]   1  Pregnancy at 40w1d  2  GBS positive   3  PROM  4  Anemia  5  Asthma    Discharge Diagnosis:  Same, delivered     Procedures: Primary low transverse  section    Complications:   None    Hospital Course:   Jacquie Yeung is a 27 y o   who was admitted at 40w0d wks for prelabor rupture of membranes  She received penicillin for GBS positive status  She received cytotec and pitocin for induction of labor  She received an epidural for analgesia  She became complete and started pushing, however, after 3 hours of pushing, patient declined vacuum assisted delivery and opted for  section for maternal exhaustion and failed induction of labor with arrest of descent  She underwent an uncomplicated 1LTCS and delivered a viable female infant weighing 7lbs 7 2oz with Apgars  9/9  QBL was 921 mL  Patient tolerated the procedure well and was transferred to recovery in stable condition   was then transferred to  nursery  The patient's post partum course was unremarkable  Preoperative hemoglobin was 13 2, postoperative was 11 3  Her postoperative pain was well controlled with oral analgesics  On day of discharge, she was ambulating and able to reasonably perform all ADLs  She was voiding and had appropriate bowel function  Pain was well controlled  She was discharged home on post-operative day #3 without complications  Patient was instructed to follow up with her OB as an outpatient and was given appropriate warnings to call provider if she develops signs of infection or uncontrolled pain       Condition at discharge: stable     Discharge Medications: Prenatal vitamin daily for 6 months or the duration of nursing whichever is longer  Motrin 600 mg orally every 6 hours as needed for pain  Tylenol (over the counter) per bottle directions as needed for pain  Hydrocortisone cream 1% (over the counter) applied 1-2x daily to hemorrhoids as needed  Witch hazel pads for hemorrhoidal discomfort as needed    Discharge instructions :   -Do not place anything (no partner, tampons or douche) in your vagina for 6 weeks  -You may walk for exercise for the first 6 weeks then gradually return to your usual activities    -Please do not drive for 1 week if you have no stitches and for 2 weeks if you have stitches or underwent a  delivery     -You may take baths or shower per your preference    -Please look at your bust (breasts) in the mirror daily and call for redness or tenderness or increased warmth  - If you have had a  please look at your incision daily as well and call us for increasing redness or steady drainage from the incision    -Please call us for temperature > 100 4*F or 38* C, worsening pain or a foul discharge  Disposition: Home    Provisions for Follow-Up Care: Follow up in office in 1 week for incision check  Please call  to schedule  Call sooner with questions or concerns      Planned Readmission: No

## 2020-01-29 NOTE — OB LABOR/OXYTOCIN SAFETY PROGRESS
Oxytocin Safety Progress Check Note - Jacquie Molina 27 y o  female MRN: [id number]    Unit/Bed#: L&D 321-01 Encounter: [id number]    Dose (trevon-units/min) Oxytocin: 4 trevon-units/min  Contraction Frequency (minutes): 2-2 5  Contraction Quality: Moderate  Tachysystole: No   Dilation: 10        Effacement (%): 80  Station: 1  Baseline Rate: 150 bpm  Fetal Heart Rate: 135 BPM  FHR Category: Category I  Oxytocin Safety Progress Check: Safety check completed          Notes/comments:     Patient has been pushing since 12:36  Her pushes are becoming less foreceful and she is increasingly uncomfortable  In addition, she has had a Category II tracing with variables  We discussed management options and patient elected for vacuum-assisted vaginal delivery  Patient has been counseled on the risks, benefits and other management options  She is having significant pain, so anesthesia was called to bolus prior to attempting a vacuum delivery       Arleen Boyle MD 1/29/2020 2:16 AM

## 2020-01-30 PROCEDURE — 99024 POSTOP FOLLOW-UP VISIT: CPT | Performed by: OBSTETRICS & GYNECOLOGY

## 2020-01-30 RX ADMIN — KETOROLAC TROMETHAMINE 30 MG: 30 INJECTION, SOLUTION INTRAMUSCULAR at 06:19

## 2020-01-30 RX ADMIN — OXYCODONE HYDROCHLORIDE AND ACETAMINOPHEN 2 TABLET: 5; 325 TABLET ORAL at 12:09

## 2020-01-30 RX ADMIN — OXYCODONE HYDROCHLORIDE AND ACETAMINOPHEN 2 TABLET: 5; 325 TABLET ORAL at 17:25

## 2020-01-30 RX ADMIN — PANTOPRAZOLE SODIUM 40 MG: 40 TABLET, DELAYED RELEASE ORAL at 08:19

## 2020-01-30 RX ADMIN — OXYCODONE HYDROCHLORIDE 5 MG: 5 TABLET ORAL at 06:23

## 2020-01-30 RX ADMIN — OXYCODONE HYDROCHLORIDE AND ACETAMINOPHEN 2 TABLET: 5; 325 TABLET ORAL at 21:20

## 2020-01-30 RX ADMIN — OXYCODONE HYDROCHLORIDE AND ACETAMINOPHEN 2 TABLET: 5; 325 TABLET ORAL at 08:19

## 2020-01-30 NOTE — PROGRESS NOTES
Progress Note - OB/GYN  Jacquie Del Rosario 27 y o  female MRN: [id number]  Unit/Bed#: L&D 314-01 Encounter: [id number]      A/P: POD # 1 s/p  1LTCS , baby in room  1) Labs   - Hgb 13 2 --> 11 3   - UOP: 79cc/hr --> f/u void trial    2) Continue routine post-op care   - Encourage ambulation   - Encourage breastfeeding   - Patient would like micronor for contraception   - Anticipate discharge on 2/1/20         ______________      Subjective:  Patient in pain this morning  She tried to get out of bed for the first time today and reports increased pain  Discussed expectations on post-op pain       Pain: moderately controlled  Tolerating PO: yes  Voiding: trial to follow  Flatus: no  BM: no  Ambulating: yes  Breastfeeding:  yes  Chest pain: no  Shortness of breath: no  Leg pain: no  Lochia: minimal    Vitals:   /65 (BP Location: Right arm)   Pulse 89   Temp 97 9 °F (36 6 °C) (Oral)   Resp 17   Ht 5' 1" (1 549 m)   Wt 80 3 kg (177 lb)   LMP 04/23/2019 (Exact Date)   SpO2 98%   Breastfeeding Yes   BMI 33 44 kg/m²       Intake/Output Summary (Last 24 hours) at 1/30/2020 0650  Last data filed at 1/30/2020 0000  Gross per 24 hour   Intake 2897 08 ml   Output 2250 ml   Net 647 08 ml       Invasive Devices     Drain            Urethral Catheter 1 day                Physical Exam:   GEN: Jacquie Del Rosario appears well, alert and oriented x 3, pleasant and cooperative   CARDIO: RRR, no murmurs or rubs  RESP:  CTAB, no wheezes or rales  ABDOMEN: soft, no tenderness, no distention, fundus @ -2, Incision C/D/I  EXTREMITIES: SCDs on, Negative Aileen's sign bilaterally      Labs:   Admission on 01/28/2020   Component Date Value    ABO Grouping 01/28/2020 O     Rh Factor 01/28/2020 Positive     Antibody Screen 01/28/2020 Negative     Specimen Expiration Date 01/28/2020 [id number]     WBC 01/28/2020 7 09     RBC 01/28/2020 4 76     Hemoglobin 01/28/2020 13 2     Hematocrit 01/28/2020 41 5     MCV 01/28/2020 [facility] 2020 27 7     MCHC 2020 31 8     RDW 2020 15 9*    Platelets 63/10/7307 302     MPV 2020 10 1     RPR 2020 Non-Reactive     pH, Cord Art 2020 7  273     pCO2, Cord Art 2020 47 6     pO2, Cord Art 2020 31 2*    HCO3, Cord Art 2020 21 5     Base Exc, Cord Art 2020 -5 5*    O2 Content, Cord Art 2020 7 3     O2 Hgb, Arterial Cord 2020 34 9     pH, Cord Jv 2020 7  343     pCO2, Cord Jv 2020 45 7*    pO2, Cord Jv 2020 22 7     HCO3, Cord Jv 2020 24 3    [facility] Exc, Cord Jv 2020 -1 7*    O2 Cont, Cord Jv 2020 10 9     O2 HGB,VENOUS CORD 2020 52 1     WBC 2020 12 79*    RBC 2020 4 06     Hemoglobin 2020 11 3*    Hematocrit 2020 36 3     MCV 2020 89     MCH 2020 27 8     MCHC 2020 31 1*    RDW 2020 16 3*    Platelets  237     MPV 2020 10 2          Patient Active Problem List   Diagnosis    Anemia in pregnancy    Prenatal care in third trimester    Positive GBS test    Status post primary low transverse  section            Evalina Snellen, MD  2020  6:50 AM

## 2020-01-30 NOTE — SOCIAL WORK
CM received script for breast pump  Referral made to [address] via [facility], awaiting response  CM to follow

## 2020-01-30 NOTE — LACTATION NOTE
This note was copied from a baby's chart  Mother verbalized breastfeeding is going well, but she started to supplement with formula last night because she was in too much pain and she felt like the baby was not getting enough  I discussed the risks of early supplementation and enc   excl  breastfeeding for at least 3-4 weeks    Enc to call for assistance as needed,phone # given

## 2020-01-30 NOTE — PLAN OF CARE
Problem: PAIN - ADULT  Goal: Verbalizes/displays adequate comfort level or baseline comfort level  Description  Interventions:  - Encourage patient to monitor pain and request assistance  - Assess pain using appropriate pain scale  - Administer analgesics based on type and severity of pain and evaluate response  - Implement non-pharmacological measures as appropriate and evaluate response  - Consider cultural and social influences on pain and pain management  - Notify physician/advanced practitioner if interventions unsuccessful or patient reports new pain  Outcome: Progressing     Problem: INFECTION - ADULT  Goal: Absence or prevention of progression during hospitalization  Description  INTERVENTIONS:  - Assess and monitor for signs and symptoms of infection  - Monitor lab/diagnostic results  - Monitor all insertion sites, i e  indwelling lines  -   - [place] appropriate cooling/warming therapies per order  - Administer medications as ordered  - Instruct and encourage patient and family to use good hand hygiene technique  -    Outcome: Progressing  Goal: Absence of fever/infection during neutropenic period  Description  INTERVENTIONS:  - Monitor WBC    Outcome: Progressing     Problem: SAFETY ADULT  Goal: Patient will remain free of falls  Description  INTERVENTIONS:  - Assess patient frequently for physical needs  -  Identify cognitive and physical deficits and behaviors that affect risk of falls    -  [place] fall precautions as indicated by assessment   - Educate patient/family on patient safety including physical limitations  - Instruct patient to call for assistance with activity based on assessment  - Modify environment to reduce risk of injury  - Consider OT/PT consult to assist with strengthening/mobility  Outcome: Progressing  Goal: Maintain or return to baseline ADL function  Description  INTERVENTIONS:  -  Assess patient's ability to carry out ADLs; assess patient's baseline for ADL function and identify physical deficits which impact ability to perform ADLs (bathing, care of mouth/teeth, toileting, grooming, dressing, etc )  - Assess/evaluate cause of self-care deficits   - Assess range of motion  - Assess patient's mobility; develop plan if impaired  - Assess patient's need for assistive devices and provide as appropriate  - Encourage maximum independence but intervene and supervise when necessary  - Involve family in performance of ADLs  - Assess for home care needs following discharge   - Consider OT consult to assist with ADL evaluation and planning for discharge  - Provide patient education as appropriate  Outcome: Progressing  Goal: Maintain or return mobility status to optimal level  Description  INTERVENTIONS:  - Assess patient's baseline mobility status (ambulation, transfers, stairs, etc )    - Identify cognitive and physical deficits and behaviors that affect mobility  - Identify mobility aids required to assist with transfers and/or ambulation (gait belt, sit-to-stand, lift, walker, cane, etc )  - [place] fall precautions as indicated by assessment  - Record patient progress and toleration of activity level on Mobility SBAR; progress patient to next Phase/Stage  - Instruct patient to call for assistance with activity based on assessment  - Consider rehabilitation consult to assist with strengthening/weightbearing, etc   Outcome: Progressing     Problem: Knowledge Deficit  Goal: Patient/family/caregiver demonstrates understanding of disease process, treatment plan, medications, and discharge instructions  Description  Complete learning assessment and assess knowledge base    Interventions:  - Provide teaching at level of understanding  - Provide teaching via preferred learning methods  Outcome: Progressing     Problem: DISCHARGE PLANNING  Goal: Discharge to home or other facility with appropriate resources  Description  INTERVENTIONS:  - Identify barriers to discharge w/patient and caregiver  - Arrange for needed discharge resources and transportation as appropriate  - Identify discharge learning needs (meds, wound care, etc )  - Arrange for interpretive services to assist at discharge as needed  - Refer to Case Management Department for coordinating discharge planning if the patient needs post-hospital services based on physician/advanced practitioner order or complex needs related to functional status, cognitive ability, or social support system  Outcome: Progressing

## 2020-01-30 NOTE — LACTATION NOTE
This note was copied from a baby's chart  Assisted mom with breastfeeding  She had baby latched shallowly  I demo  to her how to do the cross cradle hold and how to get a deep latch  Baby latched well  I also demo  to mom how to assemble and use her Zomee breast pump that was delivered today   Enc mom to call for assistance as needed,phone # given

## 2020-01-30 NOTE — DISCHARGE INSTRUCTIONS
Self Care After Delivery   AMBULATORY CARE:   The postpartum period  is the period of time from delivery to about 6 weeks  During this time you may experience many physical and emotional changes  It is important to understand what is normal and when you need to call your healthcare provider  It is also important to know how to care for yourself during this time  Call 911 for any of the following:   · You soak through 1 pad in 15 minutes, have blurry vision, clammy or pale skin, and feel faint  · You faint or lose consciousness  · Your heart is beating faster than normal      · You have trouble breathing  · You cough up blood  Seek care immediately if:   · You soak through 1 or more pads in an hour, or pass blood clots larger than a quarter from your vagina  · Your leg is painful, red, and larger than normal      · You have severe abdominal pain  · You have a bad headache or changes in your vision  · Your episiotomy or C section incision is red, swollen, bleeding, or draining pus  · Your incision comes apart  · You see or hear things that are not there, or have thoughts of harming yourself or your baby  Contact your obstetrician or midwife if:   · You have a fever  · You have new or worsening pain in your abdomen or vagina  · You continue to have the baby blues 10 days after you deliver  · You have trouble sleeping  · You have foul-smelling discharge from your vagina  · You have pain or burning when you urinate  · You do not have a bowel movement for 3 days or more  · You have nausea or are vomiting  · You have hard lumps or red streaks over your breasts  · You have cracked nipples or bleed from your nipples  · You have questions or concerns about your condition or care  Physical changes:   The following are normal changes after you give birth:  · Pain in the area between your anus and vagina    · Breast pain    · Constipation or hemorrhoids    · Hot or cold flashes    · Vaginal bleeding or discharge    · Mild to moderate abdominal cramping    · Difficulty controlling bowel movements or urine  Emotional changes: The following are symptoms of the baby blues, or normal emotions after you give birth  The changes in your emotions may be caused by a drop in hormone levels after you deliver  If these symptoms last longer than 1 to 2 weeks after you give birth, you may have postpartum depression  · Feeling irritable    · Feeling sad    · Crying for no reason    · Feeling anxious  Breast care for nursing mothers: You may have sore breasts for 3 to 6 days after you give birth  This happens as your milk begins to fill your breasts  You may also have sore breasts if you do not breastfeed frequently  Do the following to care for your breasts:  · Apply a moist, warm, compress to your breast as directed  This may help soothe your breasts  Make sure the washcloth is not too hot before you apply it to your breast      · Nurse your baby or pump your milk frequently  This may prevent clogged milk ducts  Ask your healthcare provider how often to nurse or pump  · Massage your breasts as directed  This may help increase your milk flow  Gently rub your breasts in a circular motion before you breastfeed  You may need to gently squeeze your breast or nipple to help release milk  You can also use a breast pump to help release milk from your breast      · Wash your breasts with warm water only  Do not put soap on your nipples  Soap may cause your nipples to become dry  · Apply lanolin cream to your nipples as directed  Lanolin cream may add moisture to your skin and prevent nipple dryness  Always  wash off lanolin cream with warm water before you breastfeed  · Place pads in your bra  Your nipples may leak milk when you are not breastfeeding  You can place pads inside of your bra to help prevent leaking onto your clothing   Ask your healthcare provider where to purchase bra pads  · Get breastfeeding support if needed  There are healthcare providers who can answer questions about breastfeeding and provide you with support  Ask your healthcare provider who you can contact if you need breastfeeding support  Breast care for non-nursing mothers:  Milk will fill your breasts even if you bottle feed your baby  Do the following to help stop your milk from filling your breasts and causing pain:  · Wear a bra with support at all times  A sports bra or a tight-fitting bra will help stop your milk from coming in  · Apply ice on each breast for 15 to 20 minutes every hour or as directed  Use an ice pack, or put crushed ice in a plastic bag  Cover it with a towel  Ice helps your milk ducts shrink  · Keep your breasts away from warm water  Warm water will make it easier for milk to fill your breasts  Stand with your breasts away from warm water in the shower  · Limit how much you touch your breasts  This will prevent them from filling with milk  Perineum care: Your perineum is the area between your rectum and vagina  It is normal to have swelling and pain in this area after you give birth  If you had an episiotomy, your healthcare provider may give you special instructions  · Clean your perineum after you use the bathroom  This may prevent infection and help with healing  Use a spray bottle with warm water to clean your perineum  You may also gently spray warm water against your perineum when you urinate  Always wipe front to back  · Take a sitz bath as directed  A sitz bath may help relieve swelling and pain  Fill your bath tub or bucket with water up to your hips and sit in the water  Use cold water for 2 days after you deliver  Then use warm water  Ask your healthcare provider for more information about a sitz bath  · Apply ice packs for the first 24 hours or as directed  Use a plastic glove filled with ice or buy an ice pack   Wrap the ice pack or plastic glove in a small towel or wash cloth  Place the ice pack on your perineum for 20 minutes at a time  · Sit on a donut-shaped pillow  This may relieve pressure on your perineum when you sit  · Use wipes with medicine or take pills as directed  Your healthcare provider may tell you to use witch hazel pads  You can place witch hazel pads in the refrigerator before you apply them to your perineum  He may also tell you to take NSAIDs  Ask your healthcare provider how often to take pills or use wipes with medicine  · Do not go swimming or take tub baths for 4 to 6 weeks or as directed  This will help prevent an infection in your vagina or uterus  Bowel and bladder care: It may take 3 to 5 days to have a bowel movement after you deliver your baby  You can do the following to prevent or manage constipation, and get control of your bowel or bladder:  · Take stool softeners as directed  A stool softener is medicine that will make your bowel movements softer  This may prevent or relieve constipation  A stool softener may also make bowel movements less painful  · Drink plenty of liquids  Ask how much liquid to drink each day and which liquids are best for you  Liquids may help prevent constipation  · Eat foods high in fiber  Examples include fruits, vegetables, grains, beans, and lentils  Ask your healthcare provider how much fiber you need each day  Fiber may prevent constipation  · Do Kegel exercises as directed  Kegel exercises will help strengthen the muscles that control bowel movements and urination  Ask your healthcare provider for more information on Kegel exercises  · Apply cold compresses or medicine to hemorrhoids as directed  This may relieve swelling and pain  Your healthcare provider may tell you to apply ice or wipes with medicine to your hemorrhoids  He may also tell you to use a sitz bath   Ask your healthcare for more information on how to manage hemorrhoids  Nutrition:  Good nutrition is important in the postpartum period  It will help you return to a healthy weight, increase your energy levels, and prevent constipation  It will also help you get enough nutrients and calories if you are going to breastfeed your baby  · Eat a variety of healthy foods  Healthy foods include fruits, vegetables, whole-grain breads, low-fat dairy products, beans, lean meats, and fish  You may need 500 to 700 additional calories each day if you breastfeed your baby  You may also need extra protein  · Limit foods with added sugar and high amounts of fat  These foods are high in calories and low in healthy nutrients  Read food labels so you know how much sugar and fat is in the food you want to eat  · Drink 8 to 10 glasses of water per day  Water will help you make plenty of milk for your baby  It will also help prevent constipation  Drink a glass of water every time you breastfeed your baby  · Take vitamins as directed  Ask your healthcare provider what vitamins you need  · Limit caffeine and alcohol if you are breastfeeding  Caffeine and alcohol can get into your breast milk  Caffeine and alcohol can make your baby fussy  They can also interfere with your baby's sleep  Ask your healthcare provider if you can drink alcohol or caffeine  Rest and sleep: You may feel very tired in the postpartum period  Enough sleep will help you heal and give you energy to care for your baby  The following may help you get sleep and rest:  · Nap when your baby naps  Your baby may nap several times during the day  Get rest during this time  · Limit visitors  Many people may want to see you and your baby  Ask friends or family to visit on different days  This will give you time to rest      · Do not plan too much for one day  Put off household chores so that you have time to rest  Gradually do more each day  · Ask for help from family, friends, or neighbors    Ask them to help you with laundry, cleaning, or errands  Also ask someone to watch the baby while you take a nap or relax  Ask your partner to help with the care of your baby  Pump some of your breast milk so your partner can feed your baby during the night  Exercise after delivery:  Wait until your healthcare provider says it is okay to exercise  Exercise can help you lose weight, increase your energy levels, and manage your mood  It can also prevent constipation and blood clots  Start with gentle exercises such as walking  Do more as you have more energy  You may need to avoid abdominal exercises for 1 to 2 weeks after you deliver  Talk to your healthcare provider about an exercise plan that is right for you  Sexual activity after delivery:   · Do not have sex until your healthcare provider says it is okay  You may need to wait 4 to 6 weeks before you have sex  This may prevent infection and allow time to heal      · Your menstrual cycle may begin as soon as 3 weeks after you deliver  Your period may be delayed if you breastfeed your baby  You can become pregnant before you get your first postpartum period  Talk to your healthcare provider about birth control that is right for you  Some types of birth control are not safe during breastfeeding  For support and more information:  Join a support group for new mothers  Ask for help from family and friends with chores, errands, and care of your baby  · Office of Women's Health, [place] Department of Health and Human Services  [address], [address] [place]  [place] , [address]  [address], [address] [place]  [place] , [address]  Phone: [phone]  Web Address: www womenshealth gov  · [facility]partum [address] [facility] , [address] [place] [address]  [address]  Web Address: [email or web address]/[email or web address]-care  aspx  Follow up with your obstetrician or midwife as directed:   You will need to follow up with your healthcare provider in 2 to 6 weeks after delivery  Write down your questions so you remember to ask them at your visits  ©  [address]  Information is for End User's use only and may not be sold, redistributed or otherwise used for commercial purposes  All illustrations and images included in CareNotes® are the copyrighted property of A D A M , Inc  or Bhupendra Shafer  The above information is an  only  It is not intended as medical advice for individual conditions or treatments  Talk to your doctor, nurse or pharmacist before following any medical regimen to see if it is safe and effective for you   Section   WHAT YOU SHOULD KNOW:   A  delivery, or , is abdominal surgery to deliver your baby  There are many reasons you may need a   · A  may be scheduled before labor if you had a  with your last baby  It may be scheduled if your baby is not positioned normally, or you are pregnant with more than 1 baby  · Your caregiver may perform an emergency  during labor to prevent life-threatening complications for you or your baby  A  may be done if your cervix does not dilate after several hours of active labor  · Other reasons for a  include maternal infections and problems with the placenta  AFTER YOU LEAVE:   Medicines:   · Prescription pain medicine  may be given  Ask how to take this medicine safely  · Acetaminophen  decreases pain and fever  It is available without a doctor's order  Ask how much to take and how often to take it  Follow directions  Acetaminophen can cause liver damage if not taken correctly  · NSAIDs  help decrease swelling and pain or fever  This medicine is available with or without a doctor's order  NSAIDs can cause stomach bleeding or kidney problems in certain people   If you take blood thinner medicine, always ask your obstetrician if NSAIDs are safe for you  Always read the medicine label and follow directions  · Take your medicine as directed  Contact your obstetrician (OB) if you think your medicine is not helping or if you have side effects  Tell him if you are allergic to any medicine  Keep a list of the medicines, vitamins, and herbs you take  Include the amounts, and when and why you take them  Bring the list or the pill bottles to follow-up visits  Carry your medicine list with you in case of an emergency  Follow up with your OB as directed: You may need to return to have your stitches or staples removed  Write down your questions so you remember to ask them during your visits  Wound care:  Carefully wash your wound with soap and water every day  Keep your wound clean and dry  Wear loose, comfortable clothes that do not rub against your wound  Ask your OB about bathing and showering  Drink plenty of liquids: You can lower your risk for a blood clot if you drink plenty of liquids  Ask how much liquid to drink each day and which liquids are best for you  Limit activity until you have fully recovered from surgery:   · Ask when it is safe for you to drive, walk up stairs, lift heavy objects, and have sex  · Ask when it is okay to exercise, and what types of exercise to do  Start slowly and do more as you get stronger  Contact your OB if:   · You have heavy vaginal bleeding that fills 1 or more sanitary pads in 1 hour  · You have a fever  · Your incision is swollen, red, or draining pus  · You have questions or concerns about yourself or your baby  Seek care immediately or call 911 if:   · Blood soaks through your bandage  · Your stitches come apart  · You feel lightheaded, short of breath, and have chest pain  · You cough up blood  · Your arm or leg feels warm, tender, and painful  It may look swollen and red    © 2014 [address] is for End User's use only and may not be sold, redistributed or otherwise used for commercial purposes  All illustrations and images included in CareNotes® are the copyrighted property of A D A M , Inc  or Bhupendra Shafer  The above information is an  only  It is not intended as medical advice for individual conditions or treatments  Talk to your doctor, nurse or pharmacist before following any medical regimen to see if it is safe and effective for you

## 2020-01-30 NOTE — PLAN OF CARE
Problem: PAIN - ADULT  Goal: Verbalizes/displays adequate comfort level or baseline comfort level  Description  Interventions:  - Encourage patient to monitor pain and request assistance  - Assess pain using appropriate pain scale  - Administer analgesics based on type and severity of pain and evaluate response  - Implement non-pharmacological measures as appropriate and evaluate response  - Consider cultural and social influences on pain and pain management  - Notify physician/advanced practitioner if interventions unsuccessful or patient reports new pain  Outcome: Progressing     Problem: INFECTION - ADULT  Goal: Absence or prevention of progression during hospitalization  Description  INTERVENTIONS:  - Assess and monitor for signs and symptoms of infection  - Monitor lab/diagnostic results  - Monitor all insertion sites, i e  indwelling lines  -   - [place] appropriate cooling/warming therapies per order  - Administer medications as ordered  - Instruct and encourage patient and family to use good hand hygiene technique  -    Outcome: Progressing  Goal: Absence of fever/infection during neutropenic period  Description  INTERVENTIONS:  - Monitor WBC    Outcome: Progressing     Problem: SAFETY ADULT  Goal: Patient will remain free of falls  Description  INTERVENTIONS:  - Assess patient frequently for physical needs  -  Identify cognitive and physical deficits and behaviors that affect risk of falls    -  [place] fall precautions as indicated by assessment   - Educate patient/family on patient safety including physical limitations  - Instruct patient to call for assistance with activity based on assessment  - Modify environment to reduce risk of injury  - Consider OT/PT consult to assist with strengthening/mobility  Outcome: Progressing  Goal: Maintain or return to baseline ADL function  Description  INTERVENTIONS:  -  Assess patient's ability to carry out ADLs; assess patient's baseline for ADL function and identify physical deficits which impact ability to perform ADLs (bathing, care of mouth/teeth, toileting, grooming, dressing, etc )  - Assess/evaluate cause of self-care deficits   - Assess range of motion  - Assess patient's mobility; develop plan if impaired  - Assess patient's need for assistive devices and provide as appropriate  - Encourage maximum independence but intervene and supervise when necessary  - Involve family in performance of ADLs  - Assess for home care needs following discharge   - Consider OT consult to assist with ADL evaluation and planning for discharge  - Provide patient education as appropriate  Outcome: Progressing  Goal: Maintain or return mobility status to optimal level  Description  INTERVENTIONS:  - Assess patient's baseline mobility status (ambulation, transfers, stairs, etc )    - Identify cognitive and physical deficits and behaviors that affect mobility  - Identify mobility aids required to assist with transfers and/or ambulation (gait belt, sit-to-stand, lift, walker, cane, etc )  - [place] fall precautions as indicated by assessment  - Record patient progress and toleration of activity level on Mobility SBAR; progress patient to next Phase/Stage  - Instruct patient to call for assistance with activity based on assessment  - Consider rehabilitation consult to assist with strengthening/weightbearing, etc   Outcome: Progressing     Problem: Knowledge Deficit  Goal: Patient/family/caregiver demonstrates understanding of disease process, treatment plan, medications, and discharge instructions  Description  Complete learning assessment and assess knowledge base    Interventions:  - Provide teaching at level of understanding  - Provide teaching via preferred learning methods  Outcome: Progressing     Problem: DISCHARGE PLANNING  Goal: Discharge to home or other facility with appropriate resources  Description  INTERVENTIONS:  - Identify barriers to discharge w/patient and caregiver  - Arrange for needed discharge resources and transportation as appropriate  - Identify discharge learning needs (meds, wound care, etc )  - Arrange for interpretive services to assist at discharge as needed  - Refer to Case Management Department for coordinating discharge planning if the patient needs post-hospital services based on physician/advanced practitioner order or complex needs related to functional status, cognitive ability, or social support system  Outcome: Progressing

## 2020-01-30 NOTE — PLAN OF CARE
Problem: PAIN - ADULT  Goal: Verbalizes/displays adequate comfort level or baseline comfort level  Description  Interventions:  - Encourage patient to monitor pain and request assistance  - Assess pain using appropriate pain scale  - Administer analgesics based on type and severity of pain and evaluate response  - Implement non-pharmacological measures as appropriate and evaluate response  - Consider cultural and social influences on pain and pain management  - Notify physician/advanced practitioner if interventions unsuccessful or patient reports new pain  1/30/2020 1105 by Hermes Nazario RN  Outcome: Progressing  1/30/2020 1103 by Hermes Nazario RN  Outcome: Progressing     Problem: INFECTION - ADULT  Goal: Absence or prevention of progression during hospitalization  Description  INTERVENTIONS:  - Assess and monitor for signs and symptoms of infection  - Monitor lab/diagnostic results  - Monitor all insertion sites, i e  indwelling lines  -   - [place] appropriate cooling/warming therapies per order  - Administer medications as ordered  - Instruct and encourage patient and family to use good hand hygiene technique  -    1/30/2020 1105 by Hermes Nazario RN  Outcome: Progressing  1/30/2020 1103 by Hermes Nazario RN  Outcome: Progressing  Goal: Absence of fever/infection during neutropenic period  Description  INTERVENTIONS:  - Monitor WBC    1/30/2020 1105 by Hermes Nazario RN  Outcome: Progressing  1/30/2020 1103 by Hermes Nazario RN  Outcome: Progressing     Problem: SAFETY ADULT  Goal: Patient will remain free of falls  Description  INTERVENTIONS:  - Assess patient frequently for physical needs  -  Identify cognitive and physical deficits and behaviors that affect risk of falls    -  [place] fall precautions as indicated by assessment   - Educate patient/family on patient safety including physical limitations  - Instruct patient to call for assistance with activity based on assessment  - Modify environment to reduce risk of injury  - Consider OT/PT consult to assist with strengthening/mobility  1/30/2020 1105 by Orestes Finley RN  Outcome: Progressing  1/30/2020 1103 by Orestse Finley RN  Outcome: Progressing  Goal: Maintain or return to baseline ADL function  Description  INTERVENTIONS:  -  Assess patient's ability to carry out ADLs; assess patient's baseline for ADL function and identify physical deficits which impact ability to perform ADLs (bathing, care of mouth/teeth, toileting, grooming, dressing, etc )  - Assess/evaluate cause of self-care deficits   - Assess range of motion  - Assess patient's mobility; develop plan if impaired  - Assess patient's need for assistive devices and provide as appropriate  - Encourage maximum independence but intervene and supervise when necessary  - Involve family in performance of ADLs  - Assess for home care needs following discharge   - Consider OT consult to assist with ADL evaluation and planning for discharge  - Provide patient education as appropriate  1/30/2020 1105 by Orestes Finley RN  Outcome: Progressing  1/30/2020 1103 by Orestes Finley RN  Outcome: Progressing  Goal: Maintain or return mobility status to optimal level  Description  INTERVENTIONS:  - Assess patient's baseline mobility status (ambulation, transfers, stairs, etc )    - Identify cognitive and physical deficits and behaviors that affect mobility  - Identify mobility aids required to assist with transfers and/or ambulation (gait belt, sit-to-stand, lift, walker, cane, etc )  - [place] fall precautions as indicated by assessment  - Record patient progress and toleration of activity level on Mobility SBAR; progress patient to next Phase/Stage  - Instruct patient to call for assistance with activity based on assessment  - Consider rehabilitation consult to assist with strengthening/weightbearing, etc   1/30/2020 1105 by Orestes Finley RN  Outcome: Progressing  1/30/2020 1103 by Curtis Sood RN  Outcome: Progressing     Problem: Knowledge Deficit  Goal: Patient/family/caregiver demonstrates understanding of disease process, treatment plan, medications, and discharge instructions  Description  Complete learning assessment and assess knowledge base    Interventions:  - Provide teaching at level of understanding  - Provide teaching via preferred learning methods  1/30/2020 1105 by Curtis Sood RN  Outcome: Progressing  1/30/2020 1103 by Curtis Sood RN  Outcome: Progressing     Problem: DISCHARGE PLANNING  Goal: Discharge to home or other facility with appropriate resources  Description  INTERVENTIONS:  - Identify barriers to discharge w/patient and caregiver  - Arrange for needed discharge resources and transportation as appropriate  - Identify discharge learning needs (meds, wound care, etc )  - Arrange for interpretive services to assist at discharge as needed  - Refer to Case Management Department for coordinating discharge planning if the patient needs post-hospital services based on physician/advanced practitioner order or complex needs related to functional status, cognitive ability, or social support system  1/30/2020 1105 by Curtis Sood RN  Outcome: Progressing  1/30/2020 1103 by Curtis Sood RN  Outcome: Progressing

## 2020-01-31 PROCEDURE — 99024 POSTOP FOLLOW-UP VISIT: CPT | Performed by: OBSTETRICS & GYNECOLOGY

## 2020-01-31 RX ORDER — IBUPROFEN 600 MG/1
600 TABLET ORAL EVERY 6 HOURS PRN
Status: DISCONTINUED | OUTPATIENT
Start: 2020-01-31 | End: 2020-02-01 | Stop reason: HOSPADM

## 2020-01-31 RX ORDER — DOCUSATE SODIUM 100 MG/1
100 CAPSULE, LIQUID FILLED ORAL 2 TIMES DAILY
Status: DISCONTINUED | OUTPATIENT
Start: 2020-01-31 | End: 2020-02-01 | Stop reason: HOSPADM

## 2020-01-31 RX ORDER — SIMETHICONE 80 MG
80 TABLET,CHEWABLE ORAL EVERY 6 HOURS PRN
Status: DISCONTINUED | OUTPATIENT
Start: 2020-01-31 | End: 2020-02-01 | Stop reason: HOSPADM

## 2020-01-31 RX ADMIN — IBUPROFEN 600 MG: 600 TABLET ORAL at 06:21

## 2020-01-31 RX ADMIN — OXYCODONE HYDROCHLORIDE AND ACETAMINOPHEN 2 TABLET: 5; 325 TABLET ORAL at 21:55

## 2020-01-31 RX ADMIN — PANTOPRAZOLE SODIUM 40 MG: 40 TABLET, DELAYED RELEASE ORAL at 06:21

## 2020-01-31 RX ADMIN — DOCUSATE SODIUM 100 MG: 100 CAPSULE, LIQUID FILLED ORAL at 12:56

## 2020-01-31 RX ADMIN — OXYCODONE HYDROCHLORIDE AND ACETAMINOPHEN 2 TABLET: 5; 325 TABLET ORAL at 12:56

## 2020-01-31 RX ADMIN — OXYCODONE HYDROCHLORIDE AND ACETAMINOPHEN 2 TABLET: 5; 325 TABLET ORAL at 01:29

## 2020-01-31 NOTE — PROGRESS NOTES
Progress Note - OB/GYN  Jacquie Pemberton 27 y o  female MRN: [id number]  Unit/Bed#: L&D 314-01 Encounter: [id number]      A/P: 2 Days Post-Op s/p  1LTCS , baby in room  1) Labs   - Hgb 13 2 --> 11 3   - Voiding freely    2) Continue routine post-op care   - Encourage ambulation   - Encourage breastfeeding   - Patient would like micronor for contraception   - Anticipate discharge on 2/1/20         ______________      Subjective:  Patient reporting increased pain following ambulation yesterday otherwise feeling well   No new complaints    Pain: moderately controlled  Tolerating PO: yes  Voiding: yes  Flatus: yes  BM: no  Ambulating: yes  Breastfeeding:  yes  Chest pain: no  Shortness of breath: no  Leg pain: no  Lochia: minimal    Vitals:   /68 (BP Location: Left arm)   Pulse 79   Temp 97 9 °F (36 6 °C) (Oral)   Resp 18   Ht 5' 1" (1 549 m)   Wt 80 3 kg (177 lb)   LMP 04/23/2019 (Exact Date)   SpO2 100%   Breastfeeding Yes   BMI 33 44 kg/m²       Intake/Output Summary (Last 24 hours) at 1/31/2020 0612  Last data filed at 1/31/2020 0131  Gross per 24 hour   Intake 240 ml   Output 3200 ml   Net -2960 ml       Invasive Devices     None                 Physical Exam:   GEN: Jacquie Pemberton appears well, alert and oriented x 3, pleasant and cooperative   CARDIO: RRR, no murmurs or rubs  RESP:  CTAB, no wheezes or rales  ABDOMEN: soft, no tenderness, no distention, fundus @ -2, Incision C/D/I  EXTREMITIES: SCDs on, Negative Aileen's sign bilaterally      Labs:   Admission on 01/28/2020   Component Date Value    ABO Grouping 01/28/2020 O     Rh Factor 01/28/2020 Positive     Antibody Screen 01/28/2020 Negative     Specimen Expiration Date 01/28/2020 [id number]     WBC 01/28/2020 7 09     RBC 01/28/2020 4 76     Hemoglobin 01/28/2020 13 2     Hematocrit 01/28/2020 41 5     MCV 01/28/2020 87     MCH 01/28/2020 27 7     MCHC 01/28/2020 31 8     RDW 01/28/2020 15 9*    Platelets 52/20/1346 302  MPV 2020 10 1     RPR 2020 Non-Reactive     pH, Cord Art 2020 7  273     pCO2, Cord Art 2020 47 6     pO2, Cord Art 2020 31 2*    HCO3, Cord Art 2020 21 5     Base Exc, Cord Art 2020 -5 5*    O2 Content, Cord Art 2020 7 3     O2 Hgb, Arterial Cord 2020 34 9     pH, Cord Jv 2020 7  343     pCO2, Cord Jv 2020 45 7*    pO2, Cord Jv 2020 22 7     HCO3, Cord Jv 2020 24 3    [facility] Exc, Cord Jv 2020 -1 7*    O2 Cont, Cord Jv 2020 10 9     O2 HGB,VENOUS CORD 2020 52 1     WBC 2020 12 79*    RBC 2020 4 06     Hemoglobin 2020 11 3*    Hematocrit 2020 36 3     MCV 2020 89     MCH 2020 27 8     MCHC 2020 31 1*    RDW 2020 16 3*    Platelets  237     MPV 2020 10 2          Patient Active Problem List   Diagnosis    Anemia in pregnancy    Prenatal care in third trimester    Positive GBS test    Status post primary low transverse  section          Sabrina Thompson MD, PGY-2  2020  6:42 AM

## 2020-02-01 VITALS
RESPIRATION RATE: 20 BRPM | BODY MASS INDEX: 33.42 KG/M2 | TEMPERATURE: 98.6 F | OXYGEN SATURATION: 97 % | HEIGHT: 61 IN | DIASTOLIC BLOOD PRESSURE: 71 MMHG | HEART RATE: 84 BPM | SYSTOLIC BLOOD PRESSURE: 115 MMHG | WEIGHT: 177 LBS

## 2020-02-01 PROCEDURE — 99024 POSTOP FOLLOW-UP VISIT: CPT | Performed by: OBSTETRICS & GYNECOLOGY

## 2020-02-01 RX ORDER — DOCUSATE SODIUM 100 MG/1
100 CAPSULE, LIQUID FILLED ORAL 2 TIMES DAILY
Qty: 10 CAPSULE | Refills: 0 | Status: SHIPPED | OUTPATIENT
Start: 2020-02-01 | End: 2021-12-08

## 2020-02-01 RX ORDER — ACETAMINOPHEN 325 MG/1
650 TABLET ORAL EVERY 6 HOURS PRN
Qty: 30 TABLET | Refills: 0
Start: 2020-02-01 | End: 2020-03-28 | Stop reason: SDUPTHER

## 2020-02-01 RX ORDER — ACETAMINOPHEN AND CODEINE PHOSPHATE 120; 12 MG/5ML; MG/5ML
1 SOLUTION ORAL DAILY
Qty: 90 TABLET | Refills: 0 | Status: SHIPPED | OUTPATIENT
Start: 2020-02-01 | End: 2020-05-01

## 2020-02-01 RX ORDER — OXYCODONE HYDROCHLORIDE AND ACETAMINOPHEN 5; 325 MG/1; MG/1
2 TABLET ORAL EVERY 4 HOURS PRN
Qty: 12 TABLET | Refills: 0 | Status: SHIPPED | OUTPATIENT
Start: 2020-02-01 | End: 2020-02-04

## 2020-02-01 RX ORDER — ACETAMINOPHEN AND CODEINE PHOSPHATE 120; 12 MG/5ML; MG/5ML
1 SOLUTION ORAL DAILY
Qty: 28 TABLET | Refills: 2 | Status: SHIPPED | OUTPATIENT
Start: 2020-02-01 | End: 2020-11-18

## 2020-02-01 RX ORDER — IBUPROFEN 600 MG/1
600 TABLET ORAL EVERY 6 HOURS PRN
Qty: 30 TABLET | Refills: 0 | Status: SHIPPED | OUTPATIENT
Start: 2020-02-01 | End: 2020-03-28 | Stop reason: SDUPTHER

## 2020-02-01 RX ADMIN — OXYCODONE HYDROCHLORIDE AND ACETAMINOPHEN 2 TABLET: 5; 325 TABLET ORAL at 08:23

## 2020-02-01 RX ADMIN — OXYCODONE HYDROCHLORIDE AND ACETAMINOPHEN 1 TABLET: 5; 325 TABLET ORAL at 05:12

## 2020-02-01 RX ADMIN — DOCUSATE SODIUM 100 MG: 100 CAPSULE, LIQUID FILLED ORAL at 08:22

## 2020-02-01 RX ADMIN — IBUPROFEN 600 MG: 600 TABLET ORAL at 08:23

## 2020-02-01 NOTE — LACTATION NOTE
This note was copied from a baby's chart  Met with mother to go over discharge breastfeeding booklet including the feeding log  Emphasized 8 or more (12) feedings in a 24 hour period, what to expect for the number of diapers per day of life and the progression of properties of the  stooling pattern  Reviewed breastfeeding and your lifestyle, storage and preparation of breast milk, how to keep you breast pump clean, the employed breastfeeding mother and paced bottle feeding handouts  Booklet included Breastfeeding Resources for after discharge including access to the number for the [address]  C/O sore nipples  Information given about sore nipples and how to correct with positioning techniques  Discussed maneuvers to latch infant on properly to avoid nipple pain and promote healing  Discussed treatments that could be utilized to promote healing  Lanolin provided with instructions on use  Assisted mom with unwrapping baby and placing skin to skin in football hold on right breast  Baby with signs of deep latch and mom expressed more comfort with latch with baby out of bundle wrap  Encouraged parents to call for assistance, questions, and concerns about breastfeeding  Extension provided

## 2020-02-01 NOTE — PROGRESS NOTES
Progress Note - OB/GYN   Jacquie Berg 27 y o  female MRN: [id number]  Unit/Bed#: L&D 314-01 Encounter: [id number]    Assessment:  PP/POD#3 s/p Primary low transverse  section, stable    Plan:  1  Routine post op care  -patient completing all ADLs appropriately  2  Discharge home  -micronor sent to pharmacy for contraception-patient counseled again about the importance of taking the pill at the same time every day to prevent an unintended pregnancy  She was also counseled that this is not a reliable form of birth control and she was encouraged to reconsider other options when she returns for her postpartum visit  -follow up in 1 week in the office for incision check    Subjective/Objective   Chief Complaint:     PP/POD#3 s/p Primary low transverse  section    Subjective:     Pain: yes  Tolerating PO: yes  Voiding: yes  Flatus: yes  BM: no  Ambulating: yes  Breastfeeding: Breastfeeding  Chest pain: no  Shortness of breath: no  Leg pain: no  Lochia: minimal    Objective:     Vitals:  Vitals:    20 0300 20 0700 20 1500 20 2248   BP: 101/68 100/70 109/69 117/80   BP Location: Left arm Right arm Right arm Left arm   Pulse: 79 80 81 93   Resp: 18 18 18 20   Temp: 97 9 °F (36 6 °C) 97 8 °F (36 6 °C) 98 1 °F (36 7 °C) 98 4 °F (36 9 °C)   TempSrc: Oral Oral Oral Oral   SpO2:  100% 100% 100%   Weight:       Height:           Physical Exam:     Physical Exam   GEN: NAD  Lungs: CTAB  CV:RRR  Abdomen: soft, non tender, non distended  Ext: non tender  Uterine fundus firm and non-tender, -2 cm below the umbilicus; incision c/d/i       Lab, Imaging and other studies: I have personally reviewed pertinent reports        Lab Results   Component Value Date    WBC 12 79 (H) 2020    HGB 11 3 (L) 2020    HCT 36 3 2020    MCV 89 2020     2020               Bassem Snyder MD  20

## 2020-02-01 NOTE — PLAN OF CARE
Problem: PAIN - ADULT  Goal: Verbalizes/displays adequate comfort level or baseline comfort level  Description  Interventions:  - Encourage patient to monitor pain and request assistance  - Assess pain using appropriate pain scale  - Administer analgesics based on type and severity of pain and evaluate response  - Implement non-pharmacological measures as appropriate and evaluate response  - Consider cultural and social influences on pain and pain management  - Notify physician/advanced practitioner if interventions unsuccessful or patient reports new pain  Outcome: Progressing     Problem: INFECTION - ADULT  Goal: Absence or prevention of progression during hospitalization  Description  INTERVENTIONS:  - Assess and monitor for signs and symptoms of infection  - Monitor lab/diagnostic results  - Monitor all insertion sites, i e  indwelling lines  -   - [place] appropriate cooling/warming therapies per order  - Administer medications as ordered  - Instruct and encourage patient and family to use good hand hygiene technique  -    Outcome: Progressing  Goal: Absence of fever/infection during neutropenic period  Description  INTERVENTIONS:  - Monitor WBC    Outcome: Progressing     Problem: SAFETY ADULT  Goal: Patient will remain free of falls  Description  INTERVENTIONS:  - Assess patient frequently for physical needs  -  Identify cognitive and physical deficits and behaviors that affect risk of falls    -  [place] fall precautions as indicated by assessment   - Educate patient/family on patient safety including physical limitations  - Instruct patient to call for assistance with activity based on assessment  - Modify environment to reduce risk of injury  - Consider OT/PT consult to assist with strengthening/mobility  Outcome: Progressing  Goal: Maintain or return to baseline ADL function  Description  INTERVENTIONS:  -  Assess patient's ability to carry out ADLs; assess patient's baseline for ADL function and identify physical deficits which impact ability to perform ADLs (bathing, care of mouth/teeth, toileting, grooming, dressing, etc )  - Assess/evaluate cause of self-care deficits   - Assess range of motion  - Assess patient's mobility; develop plan if impaired  - Assess patient's need for assistive devices and provide as appropriate  - Encourage maximum independence but intervene and supervise when necessary  - Involve family in performance of ADLs  - Assess for home care needs following discharge   - Consider OT consult to assist with ADL evaluation and planning for discharge  - Provide patient education as appropriate  Outcome: Progressing  Goal: Maintain or return mobility status to optimal level  Description  INTERVENTIONS:  - Assess patient's baseline mobility status (ambulation, transfers, stairs, etc )    - Identify cognitive and physical deficits and behaviors that affect mobility  - Identify mobility aids required to assist with transfers and/or ambulation (gait belt, sit-to-stand, lift, walker, cane, etc )  - [place] fall precautions as indicated by assessment  - Record patient progress and toleration of activity level on Mobility SBAR; progress patient to next Phase/Stage  - Instruct patient to call for assistance with activity based on assessment  - Consider rehabilitation consult to assist with strengthening/weightbearing, etc   Outcome: Progressing     Problem: Knowledge Deficit  Goal: Patient/family/caregiver demonstrates understanding of disease process, treatment plan, medications, and discharge instructions  Description  Complete learning assessment and assess knowledge base    Interventions:  - Provide teaching at level of understanding  - Provide teaching via preferred learning methods  Outcome: Progressing     Problem: DISCHARGE PLANNING  Goal: Discharge to home or other facility with appropriate resources  Description  INTERVENTIONS:  - Identify barriers to discharge w/patient and caregiver  - Arrange for needed discharge resources and transportation as appropriate  - Identify discharge learning needs (meds, wound care, etc )  - Arrange for interpretive services to assist at discharge as needed  - Refer to Case Management Department for coordinating discharge planning if the patient needs post-hospital services based on physician/advanced practitioner order or complex needs related to functional status, cognitive ability, or social support system  Outcome: Progressing

## 2020-02-01 NOTE — PLAN OF CARE
Problem: PAIN - ADULT  Goal: Verbalizes/displays adequate comfort level or baseline comfort level  Description  Interventions:  - Encourage patient to monitor pain and request assistance  - Assess pain using appropriate pain scale  - Administer analgesics based on type and severity of pain and evaluate response  - Implement non-pharmacological measures as appropriate and evaluate response  - Consider cultural and social influences on pain and pain management  - Notify physician/advanced practitioner if interventions unsuccessful or patient reports new pain  Outcome: Progressing     Problem: INFECTION - ADULT  Goal: Absence or prevention of progression during hospitalization  Description  INTERVENTIONS:  - Assess and monitor for signs and symptoms of infection  - Monitor lab/diagnostic results  - Monitor all insertion sites, i e  indwelling lines  -   - [place] appropriate cooling/warming therapies per order  - Administer medications as ordered  - Instruct and encourage patient and family to use good hand hygiene technique  -    Outcome: Progressing  Goal: Absence of fever/infection during neutropenic period  Description  INTERVENTIONS:  - Monitor WBC    Outcome: Progressing     Problem: SAFETY ADULT  Goal: Patient will remain free of falls  Description  INTERVENTIONS:  - Assess patient frequently for physical needs  -  Identify cognitive and physical deficits and behaviors that affect risk of falls    -  [place] fall precautions as indicated by assessment   - Educate patient/family on patient safety including physical limitations  - Instruct patient to call for assistance with activity based on assessment  - Modify environment to reduce risk of injury  - Consider OT/PT consult to assist with strengthening/mobility  Outcome: Progressing  Goal: Maintain or return to baseline ADL function  Description  INTERVENTIONS:  -  Assess patient's ability to carry out ADLs; assess patient's baseline for ADL function and identify physical deficits which impact ability to perform ADLs (bathing, care of mouth/teeth, toileting, grooming, dressing, etc )  - Assess/evaluate cause of self-care deficits   - Assess range of motion  - Assess patient's mobility; develop plan if impaired  - Assess patient's need for assistive devices and provide as appropriate  - Encourage maximum independence but intervene and supervise when necessary  - Involve family in performance of ADLs  - Assess for home care needs following discharge   - Consider OT consult to assist with ADL evaluation and planning for discharge  - Provide patient education as appropriate  Outcome: Progressing  Goal: Maintain or return mobility status to optimal level  Description  INTERVENTIONS:  - Assess patient's baseline mobility status (ambulation, transfers, stairs, etc )    - Identify cognitive and physical deficits and behaviors that affect mobility  - Identify mobility aids required to assist with transfers and/or ambulation (gait belt, sit-to-stand, lift, walker, cane, etc )  - [place] fall precautions as indicated by assessment  - Record patient progress and toleration of activity level on Mobility SBAR; progress patient to next Phase/Stage  - Instruct patient to call for assistance with activity based on assessment  - Consider rehabilitation consult to assist with strengthening/weightbearing, etc   Outcome: Progressing     Problem: Knowledge Deficit  Goal: Patient/family/caregiver demonstrates understanding of disease process, treatment plan, medications, and discharge instructions  Description  Complete learning assessment and assess knowledge base    Interventions:  - Provide teaching at level of understanding  - Provide teaching via preferred learning methods  Outcome: Progressing     Problem: DISCHARGE PLANNING  Goal: Discharge to home or other facility with appropriate resources  Description  INTERVENTIONS:  - Identify barriers to discharge w/patient and caregiver  - Arrange for needed discharge resources and transportation as appropriate  - Identify discharge learning needs (meds, wound care, etc )  - Arrange for interpretive services to assist at discharge as needed  - Refer to Case Management Department for coordinating discharge planning if the patient needs post-hospital services based on physician/advanced practitioner order or complex needs related to functional status, cognitive ability, or social support system  Outcome: Progressing

## 2020-02-01 NOTE — PLAN OF CARE
Problem: PAIN - ADULT  Goal: Verbalizes/displays adequate comfort level or baseline comfort level  Description  Interventions:  - Encourage patient to monitor pain and request assistance  - Assess pain using appropriate pain scale  - Administer analgesics based on type and severity of pain and evaluate response  - Implement non-pharmacological measures as appropriate and evaluate response  - Consider cultural and social influences on pain and pain management  - Notify physician/advanced practitioner if interventions unsuccessful or patient reports new pain  Outcome: Progressing     Problem: INFECTION - ADULT  Goal: Absence or prevention of progression during hospitalization  Description  INTERVENTIONS:  - Assess and monitor for signs and symptoms of infection  - Monitor lab/diagnostic results  - Monitor all insertion sites, i e  indwelling lines  -   - [place] appropriate cooling/warming therapies per order  - Administer medications as ordered  - Instruct and encourage patient and family to use good hand hygiene technique  -    Outcome: Progressing  Goal: Absence of fever/infection during neutropenic period  Description  INTERVENTIONS:  - Monitor WBC    Outcome: Progressing     Problem: SAFETY ADULT  Goal: Patient will remain free of falls  Description  INTERVENTIONS:  - Assess patient frequently for physical needs  -  Identify cognitive and physical deficits and behaviors that affect risk of falls    -  [place] fall precautions as indicated by assessment   - Educate patient/family on patient safety including physical limitations  - Instruct patient to call for assistance with activity based on assessment  - Modify environment to reduce risk of injury  - Consider OT/PT consult to assist with strengthening/mobility  Outcome: Progressing  Goal: Maintain or return to baseline ADL function  Description  INTERVENTIONS:  -  Assess patient's ability to carry out ADLs; assess patient's baseline for ADL function and identify physical deficits which impact ability to perform ADLs (bathing, care of mouth/teeth, toileting, grooming, dressing, etc )  - Assess/evaluate cause of self-care deficits   - Assess range of motion  - Assess patient's mobility; develop plan if impaired  - Assess patient's need for assistive devices and provide as appropriate  - Encourage maximum independence but intervene and supervise when necessary  - Involve family in performance of ADLs  - Assess for home care needs following discharge   - Consider OT consult to assist with ADL evaluation and planning for discharge  - Provide patient education as appropriate  Outcome: Progressing  Goal: Maintain or return mobility status to optimal level  Description  INTERVENTIONS:  - Assess patient's baseline mobility status (ambulation, transfers, stairs, etc )    - Identify cognitive and physical deficits and behaviors that affect mobility  - Identify mobility aids required to assist with transfers and/or ambulation (gait belt, sit-to-stand, lift, walker, cane, etc )  - [place] fall precautions as indicated by assessment  - Record patient progress and toleration of activity level on Mobility SBAR; progress patient to next Phase/Stage  - Instruct patient to call for assistance with activity based on assessment  - Consider rehabilitation consult to assist with strengthening/weightbearing, etc   Outcome: Progressing     Problem: Knowledge Deficit  Goal: Patient/family/caregiver demonstrates understanding of disease process, treatment plan, medications, and discharge instructions  Description  Complete learning assessment and assess knowledge base    Interventions:  - Provide teaching at level of understanding  - Provide teaching via preferred learning methods  Outcome: Progressing     Problem: DISCHARGE PLANNING  Goal: Discharge to home or other facility with appropriate resources  Description  INTERVENTIONS:  - Identify barriers to discharge w/patient and caregiver  - Arrange for needed discharge resources and transportation as appropriate  - Identify discharge learning needs (meds, wound care, etc )  - Arrange for interpretive services to assist at discharge as needed  - Refer to Case Management Department for coordinating discharge planning if the patient needs post-hospital services based on physician/advanced practitioner order or complex needs related to functional status, cognitive ability, or social support system  Outcome: Progressing

## 2020-02-03 NOTE — UTILIZATION REVIEW
Notification of Maternity/Delivery & [place] Birth Information for Admission   Notification of Maternity/Delivery for Admission to our facility [address]  Be advised that this patient was admitted to our facility under Inpatient Status  Contact Rosa Ava at [phone] for additional admission information  [facility] DEPT  DEDICATED [phone]  Mother &  Information   Patient Name: Kranthi Guaman   YOB: 1995   Delivering clinician:     OB History        1    Para   1    Term   1       0    AB   0    Living   1       SAB   0    TAB   0    Ectopic   0    Multiple   0    Live Births   1                Name & MRN:   Information for the patient's :  Kashmir Ramos [[id number]]     [place] Delivery Information:  Sex: female  Delivered 2020 5:38 AM by , Low Transverse; Gestational Age: 44w3d     Measurements:  Weight: 7 lb 7 2 oz (3380 g); Height: 20"    APGAR 1 minute 5 minutes 10 minutes   Totals: 9 9       Birth Information: 27 y o  female MRN: [id number] Unit/Bed#: L&D 314-01 Estimated Date of Delivery: 20  Birthweight: No birth weight on file   Gestational Age: <None> Delivery Type: , Low Transverse          APGARS  One minute Five minutes Ten minutes   Totals:                 [address]   [address]:   [place]  Tax ID: [id number]  NPI: [id number] Attending Provider/NPI: Fredy Stewart [[id number]]   Specialty- Obstetrics and Gynecology  [facility] ID- [id number]  [address] [place] [address], [address]   Phone 1: [phone]  Fax: [phone]   Place of Service Code: 24     Place of Service Name:  [address] [place] [address]   Start Date: 20 IPADMITTIME@     Discharge Date & Time: 2020  3:21 PM    Type of Admission: Inpatient Status Discharge Disposition (if discharged): Home/Self Care   Patient Diagnoses:   Encounter for full-term uncomplicated delivery [W57]  The primary encounter diagnosis was 40 weeks gestation of pregnancy  Diagnoses of Status post primary low transverse  section and Contraception management were also pertinent to this visit  1  40 weeks gestation of pregnancy    2  Status post primary low transverse  section    3  Contraception management       Orders: Admission Orders (From admission, onward)     Ordered        20 0642  Inpatient Admission  Once                    Assigned Utilization Review Contact: Sonam Dominguez Utilization Review Department  Phone: [phone]; Fax [phone]  Email: [email or web address]

## 2020-02-05 ENCOUNTER — ROUTINE PRENATAL (OUTPATIENT)
Dept: OBGYN CLINIC | Facility: CLINIC | Age: 25
End: 2020-02-05

## 2020-02-05 VITALS
BODY MASS INDEX: 29.66 KG/M2 | HEART RATE: 84 BPM | WEIGHT: 157 LBS | SYSTOLIC BLOOD PRESSURE: 122 MMHG | DIASTOLIC BLOOD PRESSURE: 84 MMHG

## 2020-02-05 PROBLEM — O99.019 ANEMIA IN PREGNANCY: Status: RESOLVED | Noted: 2019-12-04 | Resolved: 2020-02-05

## 2020-02-05 PROBLEM — B95.1 POSITIVE GBS TEST: Status: RESOLVED | Noted: 2020-01-15 | Resolved: 2020-02-05

## 2020-02-05 PROBLEM — Z34.93 PRENATAL CARE IN THIRD TRIMESTER: Status: RESOLVED | Noted: 2019-12-18 | Resolved: 2020-02-05

## 2020-02-05 PROCEDURE — 1036F TOBACCO NON-USER: CPT | Performed by: NURSE PRACTITIONER

## 2020-02-05 PROCEDURE — 1111F DSCHRG MED/CURRENT MED MERGE: CPT | Performed by: NURSE PRACTITIONER

## 2020-02-05 PROCEDURE — 99213 OFFICE O/P EST LOW 20 MIN: CPT | Performed by: NURSE PRACTITIONER

## 2020-02-05 NOTE — PROGRESS NOTES
POSTPARTUM VISIT    Jacquie Case presents today for incision check  She had a  delivery on 2020  Complications included none  Review of Systems:   -Constitutional: denies issues, reports pain controlled with occasional Motrin   -Breasts: denies tenderness   -Gynecologic: lochia continues - small amount   -Urinary: denies issues urinating   -GI: stools WNL, denies issues    Physical Exam:   -Vitals:   Vitals:    20 1008   BP: 122/84   Pulse: 84   Weight: 71 2 kg (157 lb)      -General: A&Ox3, no acute distress noted   -Abdomen: soft, non-tender, incision appears clean/dry/intact and well-healed   -Extremities: nontender, no edema noted    Assessment/Plan:  1  Normal incision check  2  Return in 3 weeks for postpartum visit

## 2020-02-06 LAB — PLACENTA IN STORAGE: NORMAL

## 2020-02-26 ENCOUNTER — POSTPARTUM VISIT (OUTPATIENT)
Dept: OBGYN CLINIC | Facility: CLINIC | Age: 25
End: 2020-02-26

## 2020-02-26 VITALS
HEIGHT: 61 IN | WEIGHT: 152 LBS | BODY MASS INDEX: 28.7 KG/M2 | DIASTOLIC BLOOD PRESSURE: 70 MMHG | SYSTOLIC BLOOD PRESSURE: 110 MMHG

## 2020-02-26 DIAGNOSIS — Z98.891 STATUS POST PRIMARY LOW TRANSVERSE CESAREAN SECTION: ICD-10-CM

## 2020-02-26 DIAGNOSIS — Z30.09 UNWANTED FERTILITY: ICD-10-CM

## 2020-02-26 PROCEDURE — 1111F DSCHRG MED/CURRENT MED MERGE: CPT | Performed by: NURSE PRACTITIONER

## 2020-02-26 PROCEDURE — 1036F TOBACCO NON-USER: CPT | Performed by: NURSE PRACTITIONER

## 2020-02-26 PROCEDURE — 99213 OFFICE O/P EST LOW 20 MIN: CPT | Performed by: NURSE PRACTITIONER

## 2020-02-26 RX ORDER — NORETHINDRONE ACETATE AND ETHINYL ESTRADIOL 1MG-20(21)
1 KIT ORAL DAILY
Qty: 28 TABLET | Refills: 3 | Status: SHIPPED | OUTPATIENT
Start: 2020-02-26 | End: 2021-05-14 | Stop reason: SDUPTHER

## 2020-02-26 NOTE — PROGRESS NOTES
POSTPARTUM VISIT    Jacquie Zuniga presents today for postpartum visit  She had a  delivery on 2020  Complications included none  She is bottlefeeding her infant and reports no issues with such  She desires OCP for contraception  She was provided with Shavonne Jeffers Depression Screening tool and her score was 5  Review of Systems:   -Constitutional: denies issues, reports only rare mild incisional pain   -Breasts: denies with slight tenderness bilaterally   -Gynecologic: lochia stopped 3 days ago   -Urinary: denies issues urinating   -GI: stools WNL, denies issues    Physical Exam:   -Vitals:   Vitals:    20 1105   BP: 110/70   Weight: 68 9 kg (152 lb)   Height: 5' 1" (1 549 m)      -General: A&Ox3, no acute distress noted   -Abdomen: soft, non-tender, incision appears clean/dry/intact and well-healed   -Extremities: nontender, no edema noted   -Breasts: deferred   -Pelvic exam: deferred    Assessment/Plan:  1  Normal postpartum exam   2  Depression screening negative  3  Last pap smear was done 2019 and result was NILM  Advise return for next annual GYN exam in 2020   4  Contraception: Desires OCP  Given Rx Junel Fe and advised to start after 6 weeks postpartum  5  Return in 3 months for pill check

## 2020-03-28 ENCOUNTER — TELEMEDICINE (OUTPATIENT)
Dept: FAMILY MEDICINE CLINIC | Facility: CLINIC | Age: 25
End: 2020-03-28

## 2020-03-28 DIAGNOSIS — J30.9 ALLERGIC RHINITIS, UNSPECIFIED SEASONALITY, UNSPECIFIED TRIGGER: ICD-10-CM

## 2020-03-28 DIAGNOSIS — J02.9 PHARYNGITIS, UNSPECIFIED ETIOLOGY: ICD-10-CM

## 2020-03-28 DIAGNOSIS — Z20.828 EXPOSURE TO SARS-ASSOCIATED CORONAVIRUS: Primary | ICD-10-CM

## 2020-03-28 DIAGNOSIS — Z98.891 STATUS POST PRIMARY LOW TRANSVERSE CESAREAN SECTION: ICD-10-CM

## 2020-03-28 DIAGNOSIS — J45.40 MODERATE PERSISTENT ASTHMA WITHOUT COMPLICATION: ICD-10-CM

## 2020-03-28 PROCEDURE — G2012 BRIEF CHECK IN BY MD/QHP: HCPCS | Performed by: NURSE PRACTITIONER

## 2020-03-28 RX ORDER — IBUPROFEN 600 MG/1
600 TABLET ORAL EVERY 6 HOURS PRN
Qty: 30 TABLET | Refills: 0 | Status: SHIPPED | OUTPATIENT
Start: 2020-03-28

## 2020-03-28 RX ORDER — FLUTICASONE PROPIONATE 50 MCG
1 SPRAY, SUSPENSION (ML) NASAL DAILY
Qty: 1 BOTTLE | Refills: 3 | Status: SHIPPED | OUTPATIENT
Start: 2020-03-28 | End: 2021-12-08

## 2020-03-28 RX ORDER — PREDNISONE 20 MG/1
40 TABLET ORAL DAILY
Qty: 10 TABLET | Refills: 0 | Status: SHIPPED | OUTPATIENT
Start: 2020-03-28 | End: 2020-04-02

## 2020-03-28 RX ORDER — CETIRIZINE HYDROCHLORIDE 10 MG/1
10 TABLET ORAL DAILY
Qty: 90 TABLET | Refills: 1 | Status: SHIPPED | OUTPATIENT
Start: 2020-03-28

## 2020-03-28 RX ORDER — ACETAMINOPHEN 325 MG/1
650 TABLET ORAL EVERY 6 HOURS PRN
Qty: 30 TABLET | Refills: 0
Start: 2020-03-28 | End: 2021-12-08

## 2020-03-28 RX ORDER — FLUTICASONE PROPIONATE 44 UG/1
2 AEROSOL, METERED RESPIRATORY (INHALATION) 2 TIMES DAILY
Qty: 1 INHALER | Refills: 2 | Status: SHIPPED | OUTPATIENT
Start: 2020-03-28 | End: 2020-07-14

## 2020-03-28 RX ORDER — AMOXICILLIN 500 MG/1
500 CAPSULE ORAL EVERY 8 HOURS SCHEDULED
Qty: 30 CAPSULE | Refills: 0 | Status: SHIPPED | OUTPATIENT
Start: 2020-03-28 | End: 2020-04-07

## 2020-03-28 RX ORDER — ALBUTEROL SULFATE 90 UG/1
2 AEROSOL, METERED RESPIRATORY (INHALATION) EVERY 6 HOURS PRN
Qty: 18 G | Refills: 2 | Status: SHIPPED | OUTPATIENT
Start: 2020-03-28 | End: 2020-05-13 | Stop reason: SDUPTHER

## 2020-03-28 NOTE — PROGRESS NOTES
COVID-19 Virtual Visit     This virtual check-in was done via telephone  Encounter provider Bob Berger    Provider located at [address] [place] [address] Patti Arroyo [id number] [phone]    Recent Visits  No visits were found meeting these conditions  Showing recent visits within past 7 days and meeting all other requirements     Today's Visits  Date Type Provider Dept   03/28/20 Telemedicine Kelsey Berger 50 today's visits and meeting all other requirements     Future Appointments  Date Type Provider Dept   03/28/20 Telemedicine Mirta Berger 47   Showing future appointments within next 150 days and meeting all other requirements        Patient agrees to participate in a virtual check in via telephone or video visit instead of presenting to the office to address urgent/immediate medical needs  Patient is aware this is a billable service  After connecting through telephone, the patient was identified by name and date of birth  Jacquie Heck Douglassang was informed that this was a telemedicine visit and that the exam was being conducted confidentially over secure lines  My office door was closed  No one else was in the room  Jacquie RUGGIERO Denman Habermann acknowledged consent and understanding of privacy and security of the telemedicine visit  I informed the patient that I have reviewed her record in Epic and presented the opportunity for her to ask any questions regarding the visit today  The patient agreed to participate  Darlin M Denman Habermann is a 27 y o  female who is concerned about COVID-19  She reports cough and shortness of breath  She has not traveled outside the [place]  within the last 14 days    She has not had contact with a person who is under investigation for or who is positive for COVID-19 within the last 14 days   She has not been hospitalized recently for fever and/or lower respiratory symptoms  Sore throat, congestion,   Seasonal allergies, moderate persistent asthma    Past Medical History:   Diagnosis Date    38 weeks gestation of pregnancy 2019    Asthma     Albuterol prn    Varicella     as child and immunized       Past Surgical History:   Procedure Laterality Date    NO PAST SURGERIES      OR  DELIVERY ONLY N/A 2020    Procedure:  SECTION (); Surgeon: Debby Hardwick DO;  Location: [facility];  Service: Obstetrics       Current Outpatient Medications   Medication Sig Dispense Refill    acetaminophen (TYLENOL) 325 mg tablet Take 2 tablets (650 mg total) by mouth every 6 (six) hours as needed for mild pain (Patient not taking: Reported on 2020) 30 tablet 0    albuterol (PROVENTIL HFA,VENTOLIN HFA) 90 mcg/act inhaler Inhale 2 puffs every 6 (six) hours as needed for wheezing 18 g 2    docusate sodium (COLACE) 100 mg capsule Take 1 capsule (100 mg total) by mouth 2 (two) times a day 10 capsule 0    ferrous sulfate 324 (65 Fe) mg Take 1 tablet (324 mg total) by mouth daily before breakfast (Patient not taking: Reported on 2020) 30 tablet 3    ibuprofen (MOTRIN) 600 mg tablet Take 1 tablet (600 mg total) by mouth every 6 (six) hours as needed for mild pain 30 tablet 0    norethindrone (MICRONOR) 0 35 MG tablet Take 1 tablet (0 35 mg total) by mouth daily (Patient not taking: Reported on 2020) 90 tablet 0    norethindrone (MICRONOR) 0 35 MG tablet Take 1 tablet (0 35 mg total) by mouth daily (Patient not taking: Reported on 2020) 28 tablet 2    norethindrone-ethinyl estradiol (JUNEL FE ) 1-20 MG-MCG per tablet Take 1 tablet by mouth daily 28 tablet 3    Prenatal Vit-Fe Fumarate-FA (PRENATAL VITAMIN) 27-0 8 MG TABS Take 1 tablet by mouth daily (Patient not taking: Reported on 2020) 90 tablet 4     No current facility-administered medications for this visit           Allergies   Allergen Reactions    Shellfish-Derived Products Disposition:      After clarifying the patient's history, my suspicion for COVID-19 infection is very low  The patient has had no travel history, has mostly been in the house (she has a ), and has had no exposure to anyone that has been to [place] or [place]  She is not working  I suspect that the patient has seasonal allergy exacerbation as well as exacerbation of her asthma  Based on history and symptoms she likely has acute pharyngitis for which I have sent amoxicillin to her preferred pharmacy  Advised the patient to use salt water gargles twice per day  She should continue drinking increased fluids-especially hot tea and honey  Also sent Zyrtec and Flonase to control allergic sx  Will also send a 5 day course of prednisone and Flovent for asthma exacerbation  Advised to continue with albuterol PRN for wheezing or shortness of breath  Discussed in depth that if sx are not improving or are worsening to call the office-  if shortness of breath is unrelieved with use of albuterol to proceed to emergency room  Jacquie was seen today for covid-19  Diagnoses and all orders for this visit:    Exposure to SARS-associated coronavirus    Moderate persistent asthma without complication  -     predniSONE 20 mg tablet; Take 2 tablets (40 mg total) by mouth daily for 5 days  -     fluticasone (FLOVENT HFA) 44 mcg/act inhaler; Inhale 2 puffs 2 (two) times a day Rinse mouth after use  -     albuterol (PROVENTIL HFA,VENTOLIN HFA) 90 mcg/act inhaler; Inhale 2 puffs every 6 (six) hours as needed for wheezing    Status post primary low transverse  section    Allergic rhinitis, unspecified seasonality, unspecified trigger  -     cetirizine (ZyrTEC) 10 mg tablet; Take 1 tablet (10 mg total) by mouth daily  -     fluticasone (FLONASE) 50 mcg/act nasal spray; 1 spray into each nostril daily    Pharyngitis, unspecified etiology  -     amoxicillin (AMOXIL) 500 mg capsule;  Take 1 capsule (500 mg total) by mouth every 8 (eight) hours for 10 days  -     ibuprofen (MOTRIN) 600 mg tablet; Take 1 tablet (600 mg total) by mouth every 6 (six) hours as needed for mild pain  -     acetaminophen (TYLENOL) 325 mg tablet; Take 2 tablets (650 mg total) by mouth every 6 (six) hours as needed for mild pain  -     menthol-cetylpyridinium (CEPACOL) 3 MG lozenge; Take 1 lozenge (3 mg total) by mouth as needed for sore throat        I spent 20 minutes with the patient during this virtual check-in visit

## 2020-05-13 ENCOUNTER — TELEMEDICINE (OUTPATIENT)
Dept: FAMILY MEDICINE CLINIC | Facility: CLINIC | Age: 25
End: 2020-05-13
Payer: COMMERCIAL

## 2020-05-13 DIAGNOSIS — J45.40 MODERATE PERSISTENT ASTHMA WITHOUT COMPLICATION: Primary | ICD-10-CM

## 2020-05-13 DIAGNOSIS — J30.2 SEASONAL ALLERGIES: ICD-10-CM

## 2020-05-13 PROBLEM — J45.909 ASTHMA: Status: ACTIVE | Noted: 2020-05-13

## 2020-05-13 PROCEDURE — 99203 OFFICE O/P NEW LOW 30 MIN: CPT | Performed by: FAMILY MEDICINE

## 2020-05-13 RX ORDER — ALBUTEROL SULFATE 90 UG/1
2 AEROSOL, METERED RESPIRATORY (INHALATION) EVERY 6 HOURS PRN
Qty: 18 G | Refills: 2 | Status: SHIPPED | OUTPATIENT
Start: 2020-05-13 | End: 2020-09-22 | Stop reason: SDUPTHER

## 2020-05-26 ENCOUNTER — TELEPHONE (OUTPATIENT)
Dept: OBGYN CLINIC | Facility: CLINIC | Age: 25
End: 2020-05-26

## 2020-05-27 ENCOUNTER — TELEPHONE (OUTPATIENT)
Dept: OBGYN CLINIC | Facility: CLINIC | Age: 25
End: 2020-05-27

## 2020-05-27 ENCOUNTER — OFFICE VISIT (OUTPATIENT)
Dept: OBGYN CLINIC | Facility: CLINIC | Age: 25
End: 2020-05-27

## 2020-05-27 VITALS
HEART RATE: 84 BPM | DIASTOLIC BLOOD PRESSURE: 71 MMHG | TEMPERATURE: 97.9 F | SYSTOLIC BLOOD PRESSURE: 119 MMHG | WEIGHT: 154 LBS | BODY MASS INDEX: 29.1 KG/M2

## 2020-05-27 DIAGNOSIS — Z30.09 GENERAL COUNSELING AND ADVICE ON FEMALE CONTRACEPTION: ICD-10-CM

## 2020-05-27 DIAGNOSIS — Z30.41 ENCOUNTER FOR SURVEILLANCE OF CONTRACEPTIVE PILLS: Primary | ICD-10-CM

## 2020-05-27 PROCEDURE — 99213 OFFICE O/P EST LOW 20 MIN: CPT | Performed by: NURSE PRACTITIONER

## 2020-05-27 PROCEDURE — 1036F TOBACCO NON-USER: CPT | Performed by: NURSE PRACTITIONER

## 2020-05-27 RX ORDER — NORETHINDRONE ACETATE AND ETHINYL ESTRADIOL 1; .02 MG/1; MG/1
1 TABLET ORAL DAILY
Qty: 28 TABLET | Refills: 3 | Status: SHIPPED | OUTPATIENT
Start: 2020-05-27 | End: 2020-08-27

## 2020-07-14 DIAGNOSIS — J45.40 MODERATE PERSISTENT ASTHMA WITHOUT COMPLICATION: ICD-10-CM

## 2020-07-14 RX ORDER — FLUTICASONE PROPIONATE 44 MCG
AEROSOL WITH ADAPTER (GRAM) INHALATION
Qty: 10.6 INHALER | Refills: 2 | Status: SHIPPED | OUTPATIENT
Start: 2020-07-14 | End: 2021-12-08

## 2020-08-19 ENCOUNTER — TELEPHONE (OUTPATIENT)
Dept: FAMILY MEDICINE CLINIC | Facility: CLINIC | Age: 25
End: 2020-08-19

## 2020-08-19 NOTE — TELEPHONE ENCOUNTER
----- Message from Kurt Domingo DO sent at 8/19/2020  2:33 PM EDT -----  Regarding: FW: Non-Urgent Medical Question  Contact: [phone]  Rec office visit    ----- Message -----  From: Ryan Schafern  Sent: 8/19/2020   2:17 PM EDT  To: Kurt Domingo DO  Subject: FW: Non-Urgent Medical Question                    ----- Message -----  From: Sandhya Baker  Sent: 8/19/2020  11:50 AM EDT  To: [facility]  Subject: Non-Urgent Rosa Milena Dr Lucas Wayne! 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 and it's a kind of pain I have never felt before  Also, just my mental state overall isn't doing so well  I have a medical history related to depression but I never like to bring it up until I feel that it's affecting my productivity  I'm not sure if it would be considered anxiety but I just don't feel motivated, focused or productive most days  I find myself overthinking the smallest situations and I'm finding it harder to get out and do normal day to day things  I definitely don't have any thoughts of self harm or anything like that but it's just taking an extremely big toll on me emotionally and I don't want it to get any worse  Thank you for your time,   I look forward to hearing from you! Patricia Medel

## 2020-08-19 NOTE — TELEPHONE ENCOUNTER
I called pt scheduled her for Dr Soler;'s next available appointment  Pt verbally  Expressed  that was ok

## 2020-08-25 ENCOUNTER — OFFICE VISIT (OUTPATIENT)
Dept: FAMILY MEDICINE CLINIC | Facility: CLINIC | Age: 25
End: 2020-08-25
Payer: COMMERCIAL

## 2020-08-25 VITALS
HEART RATE: 88 BPM | SYSTOLIC BLOOD PRESSURE: 102 MMHG | WEIGHT: 145.6 LBS | TEMPERATURE: 98.3 F | BODY MASS INDEX: 27.49 KG/M2 | HEIGHT: 61 IN | OXYGEN SATURATION: 99 % | RESPIRATION RATE: 16 BRPM | DIASTOLIC BLOOD PRESSURE: 70 MMHG

## 2020-08-25 DIAGNOSIS — G89.29 CHRONIC BILATERAL LOW BACK PAIN WITHOUT SCIATICA: ICD-10-CM

## 2020-08-25 DIAGNOSIS — M54.6 CHRONIC RIGHT-SIDED THORACIC BACK PAIN: ICD-10-CM

## 2020-08-25 DIAGNOSIS — G89.29 CHRONIC RIGHT-SIDED THORACIC BACK PAIN: ICD-10-CM

## 2020-08-25 DIAGNOSIS — Z13.220 SCREENING FOR HYPERLIPIDEMIA: ICD-10-CM

## 2020-08-25 DIAGNOSIS — M21.42 PES PLANUS OF BOTH FEET: ICD-10-CM

## 2020-08-25 DIAGNOSIS — M21.41 PES PLANUS OF BOTH FEET: ICD-10-CM

## 2020-08-25 DIAGNOSIS — F41.9 ANXIETY: Primary | ICD-10-CM

## 2020-08-25 DIAGNOSIS — M54.50 CHRONIC BILATERAL LOW BACK PAIN WITHOUT SCIATICA: ICD-10-CM

## 2020-08-25 PROCEDURE — 3008F BODY MASS INDEX DOCD: CPT | Performed by: NURSE PRACTITIONER

## 2020-08-25 PROCEDURE — 3008F BODY MASS INDEX DOCD: CPT | Performed by: FAMILY MEDICINE

## 2020-08-25 PROCEDURE — 99214 OFFICE O/P EST MOD 30 MIN: CPT | Performed by: FAMILY MEDICINE

## 2020-08-25 PROCEDURE — 1036F TOBACCO NON-USER: CPT | Performed by: FAMILY MEDICINE

## 2020-08-25 PROCEDURE — 3725F SCREEN DEPRESSION PERFORMED: CPT | Performed by: FAMILY MEDICINE

## 2020-08-25 RX ORDER — MELOXICAM 7.5 MG/1
7.5 TABLET ORAL DAILY
Qty: 30 TABLET | Refills: 0 | Status: SHIPPED | OUTPATIENT
Start: 2020-08-25 | End: 2020-09-17

## 2020-08-25 RX ORDER — CYCLOBENZAPRINE HCL 5 MG
5 TABLET ORAL DAILY PRN
Qty: 30 TABLET | Refills: 0 | Status: SHIPPED | OUTPATIENT
Start: 2020-08-25 | End: 2021-12-08

## 2020-08-25 RX ORDER — CITALOPRAM 10 MG/1
10 TABLET ORAL DAILY
Qty: 30 TABLET | Refills: 5 | Status: SHIPPED | OUTPATIENT
Start: 2020-08-25

## 2020-08-25 NOTE — PATIENT INSTRUCTIONS
Here for chronic low back and right mid back pain and will try meloxicam prn pain and muscle relaxant prn muscle spasm  Check mid and low back and recheck in 4 weeks  Ortho consult rec  Patient has flat feet that may be cause to back pain  Rec proper shoes with good arch support  Consider podiatry consult  Consider psych consult if anxiety med not helping such as Citalopram 10 mg daily  Recheck in 4 weeks  Stress management and talk to family and friends  Check routine labs

## 2020-08-25 NOTE — PROGRESS NOTES
Assessment/Plan:  Chief Complaint   Patient presents with    Back Pain     Mid- lower     Patient Instructions   Here for chronic low back and right mid back pain and will try meloxicam prn pain and muscle relaxant prn muscle spasm  Check mid and low back and recheck in 4 weeks  Ortho consult rec  Patient has flat feet that may be cause to back pain  Rec proper shoes with good arch support  Consider podiatry consult  Consider psych consult if anxiety med not helping such as Citalopram 10 mg daily  Recheck in 4 weeks  Stress management and talk to family and friends  Check routine labs  No problem-specific Assessment & Plan notes found for this encounter  Diagnoses and all orders for this visit:    Anxiety  -     citalopram (CeleXA) 10 mg tablet; Take 1 tablet (10 mg total) by mouth daily  -     TSH, 3rd generation; Future  -     T4, free  -     Comprehensive metabolic panel; Future  -     CBC and differential; Future    Chronic right-sided thoracic back pain  -     XR spine thoracic 3 vw; Future  -     XR spine lumbar 2 or 3 views injury; Future  -     meloxicam (MOBIC) 7 5 mg tablet; Take 1 tablet (7 5 mg total) by mouth daily  -     cyclobenzaprine (FLEXERIL) 5 mg tablet; Take 1 tablet (5 mg total) by mouth daily as needed for muscle spasms    Chronic bilateral low back pain without sciatica  -     XR spine thoracic 3 vw; Future  -     XR spine lumbar 2 or 3 views injury; Future  -     meloxicam (MOBIC) 7 5 mg tablet; Take 1 tablet (7 5 mg total) by mouth daily  -     cyclobenzaprine (FLEXERIL) 5 mg tablet; Take 1 tablet (5 mg total) by mouth daily as needed for muscle spasms    Pes planus of both feet    Screening for hyperlipidemia  -     TSH, 3rd generation; Future  -     T4, free  -     Comprehensive metabolic panel; Future  -     Lipid Panel with Direct LDL reflex; Future          Subjective:      Patient ID: Teto Langford is a 27 y o  female      Back Pain (Mid- lower) this is chronic  Patient also has anxiety after giving birth and hx of depression in family but is more anxious and overthinks things  Not focused or motivated  No thoughts to harm self  The following portions of the patient's history were reviewed and updated as appropriate: allergies, current medications, past family history, past medical history, past social history, past surgical history and problem list     Review of Systems   Constitutional: Negative  HENT: Negative  Eyes: Negative  Respiratory: Negative  Cardiovascular: Negative  Gastrointestinal: Negative  Endocrine: Negative  Genitourinary: Negative  Musculoskeletal: Positive for back pain (right mid back and low back pain )  Skin: Negative  Allergic/Immunologic: Negative  Neurological: Negative  Hematological: Negative  Psychiatric/Behavioral:        Anxiety         Objective:      /70 (BP Location: Left arm, Patient Position: Sitting, Cuff Size: Standard)   Pulse 88   Temp 98 3 °F (36 8 °C) (Temporal)   Resp 16   Ht 5' 1" (1 549 m)   Wt 66 kg (145 lb 9 6 oz)   SpO2 99%   BMI 27 51 kg/m²          Physical Exam  Constitutional:       Appearance: She is well-developed  HENT:      Head: Normocephalic and atraumatic  Right Ear: External ear normal       Left Ear: External ear normal       Nose: Nose normal    Eyes:      Conjunctiva/sclera: Conjunctivae normal       Pupils: Pupils are equal, round, and reactive to light  Neck:      Musculoskeletal: Normal range of motion and neck supple  Cardiovascular:      Rate and Rhythm: Normal rate and regular rhythm  Heart sounds: Normal heart sounds  Pulmonary:      Effort: Pulmonary effort is normal       Breath sounds: Normal breath sounds  Musculoskeletal:      Comments: Right mid back pain and low back pain   Skin:     General: Skin is warm and dry  Neurological:      Mental Status: She is alert and oriented to person, place, and time        Deep Tendon Reflexes: Reflexes are normal and symmetric  Psychiatric:         Behavior: Behavior normal       Comments: Anxiety issues, no thoughts to harm self  Over thinks

## 2020-08-27 DIAGNOSIS — Z30.41 ENCOUNTER FOR SURVEILLANCE OF CONTRACEPTIVE PILLS: ICD-10-CM

## 2020-08-27 RX ORDER — NORETHINDRONE ACETATE AND ETHINYL ESTRADIOL 1; 20 MG/1; UG/1
TABLET ORAL
Qty: 21 TABLET | Refills: 1 | Status: SHIPPED | OUTPATIENT
Start: 2020-08-27 | End: 2020-10-13

## 2020-08-29 ENCOUNTER — PATIENT MESSAGE (OUTPATIENT)
Dept: FAMILY MEDICINE CLINIC | Facility: CLINIC | Age: 25
End: 2020-08-29

## 2020-09-14 ENCOUNTER — CONSULT (OUTPATIENT)
Dept: OBGYN CLINIC | Facility: MEDICAL CENTER | Age: 25
End: 2020-09-14
Payer: COMMERCIAL

## 2020-09-14 VITALS
SYSTOLIC BLOOD PRESSURE: 112 MMHG | DIASTOLIC BLOOD PRESSURE: 71 MMHG | TEMPERATURE: 99.8 F | BODY MASS INDEX: 28.32 KG/M2 | HEIGHT: 61 IN | WEIGHT: 150 LBS | HEART RATE: 81 BPM

## 2020-09-14 DIAGNOSIS — G89.29 CHRONIC RIGHT-SIDED THORACIC BACK PAIN: ICD-10-CM

## 2020-09-14 DIAGNOSIS — M54.50 CHRONIC BILATERAL LOW BACK PAIN WITHOUT SCIATICA: ICD-10-CM

## 2020-09-14 DIAGNOSIS — G25.89 SCAPULAR DYSKINESIS: ICD-10-CM

## 2020-09-14 DIAGNOSIS — M54.6 CHRONIC RIGHT-SIDED THORACIC BACK PAIN: ICD-10-CM

## 2020-09-14 DIAGNOSIS — G89.29 CHRONIC BILATERAL LOW BACK PAIN WITHOUT SCIATICA: ICD-10-CM

## 2020-09-14 DIAGNOSIS — M54.6 THORACIC SPINE PAIN: Primary | ICD-10-CM

## 2020-09-14 PROCEDURE — 99243 OFF/OP CNSLTJ NEW/EST LOW 30: CPT | Performed by: ORTHOPAEDIC SURGERY

## 2020-09-14 NOTE — PROGRESS NOTES
Ortho Sports Medicine Back New Visit     Assesment:   27 y o  female right scapular dyskinesia with focal point tenderness inferior to periscapular region    Plan:    Conservative treatment:    Ice to knee for 20 minutes at least 1-2 times daily  PT for ROM/strengthening to knee, hip and core  OTC NSAIDS prn for pain  Imaging: All imaging from today was reviewed by myself and explained to the patient  Injection:    No Injection planned at this time  Surgery:     No surgery is recommended at this point, continue with conservative treatment plan as noted  Follow up:    Return in about 6 weeks (around 10/26/2020) for Recheck  Chief Complaint   Patient presents with    Middle Back - Pain         History of Present Illness: The patient is a 27 y o  female whose occupation is [employer], referred to me by their primary care physician, seen in clinic for consultation of back pain  Pain is located posterior aspect of the shoulder inferior to the scapula  The patient rates the pain as a 5/10  The pain has been present for a few months  The patient denies injury  Patient states that she always has history of back pain due to having flat feet and gait abnormality  Patient states that she had a baby in the end of January and started to have new location back pain about 2 months following  Patient states that it is getting progressively worse  Patient states with any heavy lifting that the pain will increase  Patient denies this pain increasing with inspiration or coughing  She denies having any numbness or tingling  She denies any new rash or trauma to this area  She denies having any weakness or restrictions in motion  She denies having any clicking or catching  The pain is characterized as sharp, stabbing  The pain is present at all times  Pain is improved by nothing  Pain is aggravated by lifting anything of significant weight         The patient has tried rest, ice and NSAIDS  Review of systems: ROS is negative other than that noted in the HPI  Constitutional: Negative for fatigue and fever  Physical Exam:    Blood pressure 112/71, pulse 81, temperature 99 8 °F (37 7 °C), height 5' 1" (1 549 m), weight 68 kg (150 lb), not currently breastfeeding  General/Constitutional: NAD, well developed, well nourished  HENT: Normocephalic, atraumatic  CV: Intact distal pulses, regular rate  Resp: No respiratory distress or labored breathing  Lymphatic: No lymphadenopathy palpated  Neuro: Alert and Oriented x 3, no focal deficits  Psych: Normal mood, normal affect, normal judgement, normal behavior  Skin: Warm, dry, no rashes, no erythema    Shoulder focused exam:       RIGHT LEFT    Scapula Atrophy Negative Negative     Winging Negative Negative     Protraction Negative Negative    Rotator cuff SS 5/5 5/5     IS 5/5 5/5     SubS 5/5 5/5    ROM     170     ER0 60 60                   IRb T6    T6    TTP: AC Negative Negative     Biceps Negative Negative     Coracoid Negative Negative    Special Tests: O'Briens Negative Negative     De La Fuente-shear Negative Negative     Cross body Adduction Negative Negative     Speeds  Negative Negative     Unruly's Negative Negative     Whipple Negative Negative       Neer Negative Negative     Garcia Negative Negative    Instability: Apprehension & relocation not tested not tested     Load & shift not tested not tested    Other: Crank Negative Negative             Focal point tenderness inferior to right periscapular region  No palpated mass or lesion or noted deformity      UE NV Exam: +2 Radial pulses bilaterally  Sensation intact to light touch C5-T1 bilaterally, Radial/median/ulnar nerve motor intact      Bilateral elbow, wrist, and and forearm ROM full, painless with passive ROM, no ttp or crepitance throughout extremities below shoulder joint    Cervical ROM is full without pain, numbness or tingling      LE NV Exam: +2 DP/PT pulses bilaterally  Sensation intact to light touch L2-S1 bilaterally    No calf tenderness to palpation bilaterally      Back Imaging    No imaging was performed today

## 2020-09-17 DIAGNOSIS — G89.29 CHRONIC RIGHT-SIDED THORACIC BACK PAIN: ICD-10-CM

## 2020-09-17 DIAGNOSIS — M54.6 CHRONIC RIGHT-SIDED THORACIC BACK PAIN: ICD-10-CM

## 2020-09-17 DIAGNOSIS — M54.50 CHRONIC BILATERAL LOW BACK PAIN WITHOUT SCIATICA: ICD-10-CM

## 2020-09-17 DIAGNOSIS — G89.29 CHRONIC BILATERAL LOW BACK PAIN WITHOUT SCIATICA: ICD-10-CM

## 2020-09-17 RX ORDER — MELOXICAM 7.5 MG/1
TABLET ORAL
Qty: 30 TABLET | Refills: 0 | Status: SHIPPED | OUTPATIENT
Start: 2020-09-17 | End: 2021-12-08

## 2020-09-22 ENCOUNTER — OFFICE VISIT (OUTPATIENT)
Dept: FAMILY MEDICINE CLINIC | Facility: CLINIC | Age: 25
End: 2020-09-22
Payer: COMMERCIAL

## 2020-09-22 VITALS
TEMPERATURE: 97.1 F | HEIGHT: 61 IN | RESPIRATION RATE: 18 BRPM | WEIGHT: 142.8 LBS | SYSTOLIC BLOOD PRESSURE: 114 MMHG | DIASTOLIC BLOOD PRESSURE: 72 MMHG | HEART RATE: 77 BPM | OXYGEN SATURATION: 99 % | BODY MASS INDEX: 26.96 KG/M2

## 2020-09-22 DIAGNOSIS — M54.9 MID BACK PAIN: ICD-10-CM

## 2020-09-22 DIAGNOSIS — M54.50 LOW BACK PAIN, UNSPECIFIED BACK PAIN LATERALITY, UNSPECIFIED CHRONICITY, UNSPECIFIED WHETHER SCIATICA PRESENT: ICD-10-CM

## 2020-09-22 DIAGNOSIS — Z23 NEED FOR INFLUENZA VACCINATION: ICD-10-CM

## 2020-09-22 DIAGNOSIS — J45.40 MODERATE PERSISTENT ASTHMA WITHOUT COMPLICATION: ICD-10-CM

## 2020-09-22 DIAGNOSIS — F41.9 ANXIETY: Primary | ICD-10-CM

## 2020-09-22 PROCEDURE — 99214 OFFICE O/P EST MOD 30 MIN: CPT | Performed by: FAMILY MEDICINE

## 2020-09-22 PROCEDURE — 90686 IIV4 VACC NO PRSV 0.5 ML IM: CPT | Performed by: FAMILY MEDICINE

## 2020-09-22 PROCEDURE — 1036F TOBACCO NON-USER: CPT | Performed by: FAMILY MEDICINE

## 2020-09-22 PROCEDURE — 90471 IMMUNIZATION ADMIN: CPT | Performed by: FAMILY MEDICINE

## 2020-09-22 RX ORDER — ALBUTEROL SULFATE 90 UG/1
2 AEROSOL, METERED RESPIRATORY (INHALATION) EVERY 6 HOURS PRN
Qty: 18 G | Refills: 2 | Status: SHIPPED | OUTPATIENT
Start: 2020-09-22 | End: 2021-05-14 | Stop reason: SDUPTHER

## 2020-09-22 NOTE — PROGRESS NOTES
Assessment/Plan:  Chief Complaint   Patient presents with    Follow-up     1m check      Patient Instructions   Here for anxiety and doing well and much better  Refilled rescue inhaler  Asthma stable  Rec PT as directed by ortho for mid and low back pain  Recheck in 6 weeks for anxiety  No problem-specific Assessment & Plan notes found for this encounter  Diagnoses and all orders for this visit:    Anxiety    Mid back pain    Low back pain, unspecified back pain laterality, unspecified chronicity, unspecified whether sciatica present    Need for influenza vaccination  -     FLUZONE: influenza vaccine, quadrivalent, 0 5 mL    Moderate persistent asthma without complication  -     albuterol (PROVENTIL HFA,VENTOLIN HFA) 90 mcg/act inhaler; Inhale 2 puffs every 6 (six) hours as needed for wheezing or shortness of breath          Subjective:      Patient ID: Jacquie Brewer is a 23 y o  female  Follow-up (1m check ) anxiety better and back pain is better  PT was recommended  Taking Citalopram as directed  The following portions of the patient's history were reviewed and updated as appropriate: allergies, current medications, past family history, past medical history, past social history, past surgical history and problem list     Review of Systems   Constitutional: Negative  HENT: Negative  Eyes: Negative  Respiratory: Negative  Cardiovascular: Negative  Gastrointestinal: Negative  Endocrine: Negative  Genitourinary: Negative  Musculoskeletal: Positive for back pain (mid and low back pain improved)  Skin: Negative  Allergic/Immunologic: Negative  Neurological: Negative  Hematological: Negative  Psychiatric/Behavioral: Negative           Anxiety better         Objective:      /72 (BP Location: Left arm, Patient Position: Sitting, Cuff Size: Adult)   Pulse 77   Temp (!) 97 1 °F (36 2 °C) (Temporal)   Resp 18   Ht 5' 1" (1 549 m)   Wt 64 8 kg (142 lb 12 8 oz)   LMP 09/22/2020   SpO2 99%   Breastfeeding No   BMI 26 98 kg/m²          Physical Exam  Constitutional:       Appearance: She is well-developed  HENT:      Head: Normocephalic and atraumatic  Right Ear: External ear normal       Left Ear: External ear normal       Nose: Nose normal    Eyes:      Conjunctiva/sclera: Conjunctivae normal       Pupils: Pupils are equal, round, and reactive to light  Neck:      Musculoskeletal: Normal range of motion and neck supple  Cardiovascular:      Rate and Rhythm: Normal rate and regular rhythm  Heart sounds: Normal heart sounds  Pulmonary:      Effort: Pulmonary effort is normal       Breath sounds: Normal breath sounds  Musculoskeletal: Normal range of motion  Comments: Mid and low Back pain is better    Skin:     General: Skin is warm and dry  Neurological:      Mental Status: She is alert and oriented to person, place, and time  Deep Tendon Reflexes: Reflexes are normal and symmetric     Psychiatric:         Behavior: Behavior normal       Comments: anxiety

## 2020-09-22 NOTE — PATIENT INSTRUCTIONS
Here for anxiety and doing well and much better  Refilled rescue inhaler  Asthma stable  Rec PT as directed by ortho for mid and low back pain  Recheck in 6 weeks for anxiety

## 2020-10-13 DIAGNOSIS — Z30.41 ENCOUNTER FOR SURVEILLANCE OF CONTRACEPTIVE PILLS: ICD-10-CM

## 2020-10-13 RX ORDER — NORETHINDRONE ACETATE AND ETHINYL ESTRADIOL 1; 20 MG/1; UG/1
TABLET ORAL
Qty: 21 TABLET | Refills: 1 | Status: SHIPPED | OUTPATIENT
Start: 2020-10-13 | End: 2020-12-11

## 2020-11-18 ENCOUNTER — OFFICE VISIT (OUTPATIENT)
Dept: FAMILY MEDICINE CLINIC | Facility: CLINIC | Age: 25
End: 2020-11-18
Payer: COMMERCIAL

## 2020-11-18 VITALS
RESPIRATION RATE: 16 BRPM | HEART RATE: 85 BPM | SYSTOLIC BLOOD PRESSURE: 118 MMHG | DIASTOLIC BLOOD PRESSURE: 70 MMHG | WEIGHT: 140.4 LBS | BODY MASS INDEX: 25.83 KG/M2 | HEIGHT: 62 IN | OXYGEN SATURATION: 98 % | TEMPERATURE: 96.9 F

## 2020-11-18 DIAGNOSIS — J45.909 UNCOMPLICATED ASTHMA, UNSPECIFIED ASTHMA SEVERITY, UNSPECIFIED WHETHER PERSISTENT: ICD-10-CM

## 2020-11-18 DIAGNOSIS — Z00.00 HEALTH CARE MAINTENANCE: ICD-10-CM

## 2020-11-18 DIAGNOSIS — Z13.220 SCREENING FOR HYPERLIPIDEMIA: Primary | ICD-10-CM

## 2020-11-18 PROCEDURE — 3008F BODY MASS INDEX DOCD: CPT | Performed by: FAMILY MEDICINE

## 2020-11-18 PROCEDURE — 99395 PREV VISIT EST AGE 18-39: CPT | Performed by: FAMILY MEDICINE

## 2020-11-18 PROCEDURE — 1036F TOBACCO NON-USER: CPT | Performed by: FAMILY MEDICINE

## 2020-12-10 DIAGNOSIS — Z30.41 ENCOUNTER FOR SURVEILLANCE OF CONTRACEPTIVE PILLS: ICD-10-CM

## 2020-12-11 RX ORDER — NORETHINDRONE ACETATE AND ETHINYL ESTRADIOL 1; 20 MG/1; UG/1
TABLET ORAL
Qty: 21 TABLET | Refills: 1 | Status: SHIPPED | OUTPATIENT
Start: 2020-12-11 | End: 2021-01-18

## 2021-01-17 DIAGNOSIS — Z30.41 ENCOUNTER FOR SURVEILLANCE OF CONTRACEPTIVE PILLS: ICD-10-CM

## 2021-01-18 RX ORDER — NORETHINDRONE ACETATE AND ETHINYL ESTRADIOL 1; 20 MG/1; UG/1
TABLET ORAL
Qty: 21 TABLET | Refills: 1 | Status: SHIPPED | OUTPATIENT
Start: 2021-01-18 | End: 2021-03-11

## 2021-01-23 DIAGNOSIS — J45.40 MODERATE PERSISTENT ASTHMA WITHOUT COMPLICATION: ICD-10-CM

## 2021-01-25 RX ORDER — FLUTICASONE PROPIONATE 44 MCG
AEROSOL WITH ADAPTER (GRAM) INHALATION
Qty: 10.6 INHALER | Refills: 2 | OUTPATIENT
Start: 2021-01-25

## 2021-03-11 DIAGNOSIS — Z30.41 ENCOUNTER FOR SURVEILLANCE OF CONTRACEPTIVE PILLS: ICD-10-CM

## 2021-03-11 RX ORDER — NORETHINDRONE ACETATE AND ETHINYL ESTRADIOL 1; 20 MG/1; UG/1
TABLET ORAL
Qty: 21 TABLET | Refills: 1 | Status: SHIPPED | OUTPATIENT
Start: 2021-03-11 | End: 2021-05-13

## 2021-03-16 ENCOUNTER — TELEMEDICINE (OUTPATIENT)
Dept: FAMILY MEDICINE CLINIC | Facility: CLINIC | Age: 26
End: 2021-03-16
Payer: COMMERCIAL

## 2021-03-16 DIAGNOSIS — R52 BODY ACHES: ICD-10-CM

## 2021-03-16 DIAGNOSIS — Z20.822 EXPOSURE TO COVID-19 VIRUS: Primary | ICD-10-CM

## 2021-03-16 DIAGNOSIS — R06.02 SOB (SHORTNESS OF BREATH): ICD-10-CM

## 2021-03-16 DIAGNOSIS — R51.9 NONINTRACTABLE HEADACHE, UNSPECIFIED CHRONICITY PATTERN, UNSPECIFIED HEADACHE TYPE: ICD-10-CM

## 2021-03-16 DIAGNOSIS — R53.83 FATIGUE, UNSPECIFIED TYPE: ICD-10-CM

## 2021-03-16 PROCEDURE — 99213 OFFICE O/P EST LOW 20 MIN: CPT | Performed by: FAMILY MEDICINE

## 2021-03-16 NOTE — PATIENT INSTRUCTIONS
QLF:[phone]  pt has been having migraines they are new, pt feels tired/ fatigued, pt had   confirmed expousure 1 1/2 weeks ago , pt has asthma so she is having trouble   breathing, pt has body/ muscle aches sympotms began 4 days ago    Rec checking for covid 19  Call if any problems  Check for fever and sob/resp distress  Self isolate until results received  May use Tylenol prn headache and body aches  Rest and fluids encouraged

## 2021-03-16 NOTE — PROGRESS NOTES
COVID-19 Virtual Visit     Assessment/Plan:    Problem List Items Addressed This Visit     None      Visit Diagnoses     Exposure to COVID-19 virus    -  Primary    Relevant Orders    Novel Coronavirus (Covid-19),PCR [facility] - Collected at [facility] or [facility]    Nonintractable headache, unspecified chronicity pattern, unspecified headache type        Fatigue, unspecified type        SOB (shortness of breath)        Body aches             Disposition:     I referred patient to one of our centralized sites for a COVID-19 swab  I have spent 15 minutes directly with the patient  Greater than 50% of this time was spent in counseling/coordination of care regarding: instructions for management and impressions  Encounter provider Mahin Doyle DO    Provider located at [address] [place], [place] [address] [facility]  [address]  [facility] [address]    Recent Visits  No visits were found meeting these conditions  Showing recent visits within past 7 days and meeting all other requirements     Today's Visits  Date Type Provider Dept   03/16/21 Telemedicine Mahin Doyle, [address] Primary Care   Showing today's visits and meeting all other requirements     Future Appointments  No visits were found meeting these conditions  Showing future appointments within next 150 days and meeting all other requirements      This virtual check-in was done via [employer] and patient was informed that this is a secure, HIPAA-compliant platform  She agrees to proceed  Patient agrees to participate in a virtual check in via telephone or video visit instead of presenting to the office to address urgent/immediate medical needs  Patient is aware this is a billable service  After connecting through [facility], the patient was identified by name and date of birth   Jacquie Ramirez Resides was informed that this was a telemedicine visit and that the exam was being conducted confidentially over secure lines  My office door was closed  No one else was in the room  Jacquie Wheat acknowledged consent and understanding of privacy and security of the telemedicine visit  I informed the patient that I have reviewed her record in Epic and presented the opportunity for her to ask any questions regarding the visit today  The patient agreed to participate  Subjective:   Jaqcuie Ernst is a 23 y o  female who is concerned about COVID-19  Patient's symptoms include fatigue, shortness of breath, myalgias and headache  Patient denies fever and cough  Date of symptom onset: 3/16/2021    Exposure:   Contact with a person who is under investigation (PUI) for or who is positive for COVID-19 within the last 14 days?: Yes    Hospitalized recently for fever and/or lower respiratory symptoms?: No      Currently a healthcare worker that is involved in direct patient care?: No      Works in a special setting where the risk of COVID-19 transmission may be high? (this may include long-term care, correctional and jail facilities; homeless shelters; assisted-living facilities and group homes ): No      Resident in a special setting where the risk of COVID-19 transmission may be high? (this may include long-term care, correctional and jail facilities; homeless shelters; assisted-living facilities and group homes ): No      No results found for: Kumar Rodrigez, [address] [place] [address], [address] [place] [address],[address]  [address] [address]  Past Medical History:   Diagnosis Date    38 weeks gestation of pregnancy 2019    Asthma     Albuterol prn    Varicella     as child and immunized     Past Surgical History:   Procedure Laterality Date    NO PAST SURGERIES      [place]  DELIVERY ONLY N/A 2020    Procedure:  SECTION (); Surgeon:  Neelima Granger DO;  Location: [facility];  Service: Obstetrics     Current Outpatient Medications   Medication Sig Dispense Refill    acetaminophen (TYLENOL) 325 mg tablet Take 2 tablets (650 mg total) by mouth every 6 (six) hours as needed for mild pain (Patient not taking: Reported on 9/14/2020) 30 tablet 0    albuterol (PROVENTIL HFA,VENTOLIN HFA) 90 mcg/act inhaler Inhale 2 puffs every 6 (six) hours as needed for wheezing or shortness of breath 18 g 2    cetirizine (ZyrTEC) 10 mg tablet Take 1 tablet (10 mg total) by mouth daily (Patient taking differently: Take 10 mg by mouth as needed ) 90 tablet 1    citalopram (CeleXA) 10 mg tablet Take 1 tablet (10 mg total) by mouth daily (Patient taking differently: Take 10 mg by mouth as needed ) 30 tablet 5    cyclobenzaprine (FLEXERIL) 5 mg tablet Take 1 tablet (5 mg total) by mouth daily as needed for muscle spasms 30 tablet 0    docusate sodium (COLACE) 100 mg capsule Take 1 capsule (100 mg total) by mouth 2 (two) times a day (Patient not taking: Reported on 9/22/2020) 10 capsule 0    ferrous sulfate 324 (65 Fe) mg Take 1 tablet (324 mg total) by mouth daily before breakfast 30 tablet 3    FLOVENT HFA 44 MCG/ACT inhaler INHALE 2 PUFFS 2 (TWO) TIMES A DAY RINSE MOUTH AFTER USE  (Patient not taking: Reported on 11/18/2020) 10 6 Inhaler 2    fluticasone (FLONASE) 50 mcg/act nasal spray 1 spray into each nostril daily (Patient not taking: Reported on 11/18/2020) 1 Bottle 3    ibuprofen (MOTRIN) 600 mg tablet Take 1 tablet (600 mg total) by mouth every 6 (six) hours as needed for mild pain (Patient not taking: Reported on 9/14/2020) 30 tablet 0    Junel 1/20 1-20 MG-MCG per tablet TAKE 1 TABLET BY MOUTH EVERY DAY 21 tablet 1    meloxicam (MOBIC) 7 5 mg tablet TAKE 1 TABLET BY MOUTH EVERY DAY 30 tablet 0    menthol-cetylpyridinium (CEPACOL) 3 MG lozenge Take 1 lozenge (3 mg total) by mouth as needed for sore throat (Patient not taking: Reported on 9/22/2020) 30 lozenge 2    norethindrone (MICRONOR) 0 35 MG tablet Take 1 tablet (0 35 mg total) by mouth daily (Patient not taking: Reported on 2/5/2020) 90 tablet 0    norethindrone-ethinyl estradiol (JUNEL FE 1/20) 1-20 MG-MCG per tablet Take 1 tablet by mouth daily 28 tablet 3    Prenatal Vit-Fe Fumarate-FA (PRENATAL VITAMIN) 27-0 8 MG TABS Take 1 tablet by mouth daily 90 tablet 4     No current facility-administered medications for this visit  Allergies   Allergen Reactions    Shellfish-Derived Products        Review of Systems   Constitutional: Positive for fatigue  Negative for fever  HENT: Negative  Eyes: Negative  Respiratory: Positive for shortness of breath  Negative for cough  Cardiovascular: Negative  Gastrointestinal: Negative  Endocrine: Negative  Genitourinary: Negative  Musculoskeletal: Positive for myalgias  Skin: Negative  Allergic/Immunologic: Negative  Neurological: Positive for headaches  Hematological: Negative  Psychiatric/Behavioral: Negative  Objective: There were no vitals filed for this visit  Physical Exam  Constitutional:       Appearance: Normal appearance  HENT:      Head: Normocephalic and atraumatic  Eyes:      Conjunctiva/sclera: Conjunctivae normal    Pulmonary:      Effort: Pulmonary effort is normal  No respiratory distress  Skin:     Coloration: Skin is not pale  Neurological:      General: No focal deficit present  Mental Status: She is alert and oriented to person, place, and time  Psychiatric:         Mood and Affect: Mood normal          Behavior: Behavior normal          Thought Content: Thought content normal          Judgment: Judgment normal        Patient Instructions   YVR:[phone]  pt has been having migraines they are new, pt feels tired/ fatigued, pt had   confirmed expousure 1 1/2 weeks ago , pt has asthma so she is having trouble   breathing, pt has body/ muscle aches sympotms began 4 days ago    Rec checking for covid 19  Call if any problems  Check for fever and sob/resp distress  Self isolate until results received   May use Tylenol prn headache and body aches  Rest and fluids encouraged  VIRTUAL VISIT DISCLAIMER    Jacquie Lowe acknowledges that she has consented to an online visit or consultation  She understands that the online visit is based solely on information provided by her, and that, in the absence of a face-to-face physical evaluation by the physician, the diagnosis she receives is both limited and provisional in terms of accuracy and completeness  This is not intended to replace a full medical face-to-face evaluation by the physician  Jacquie Tong Bigger understands and accepts these terms

## 2021-03-17 DIAGNOSIS — Z20.822 EXPOSURE TO COVID-19 VIRUS: ICD-10-CM

## 2021-03-17 PROCEDURE — U0005 INFEC AGEN DETEC AMPLI PROBE: HCPCS | Performed by: FAMILY MEDICINE

## 2021-03-17 PROCEDURE — U0003 INFECTIOUS AGENT DETECTION BY NUCLEIC ACID (DNA OR RNA); SEVERE ACUTE RESPIRATORY SYNDROME CORONAVIRUS 2 (SARS-COV-2) (CORONAVIRUS DISEASE [COVID-19]), AMPLIFIED PROBE TECHNIQUE, MAKING USE OF HIGH THROUGHPUT TECHNOLOGIES AS DESCRIBED BY CMS-2020-01-R: HCPCS | Performed by: FAMILY MEDICINE

## 2021-03-18 LAB — SARS-COV-2 RNA RESP QL NAA+PROBE: NEGATIVE

## 2021-04-30 DIAGNOSIS — Z30.41 ENCOUNTER FOR SURVEILLANCE OF CONTRACEPTIVE PILLS: ICD-10-CM

## 2021-04-30 RX ORDER — NORETHINDRONE ACETATE AND ETHINYL ESTRADIOL 1; 20 MG/1; UG/1
TABLET ORAL
Qty: 21 TABLET | Refills: 1 | OUTPATIENT
Start: 2021-04-30

## 2021-04-30 NOTE — TELEPHONE ENCOUNTER
Called patient to see if she needs refills of her birth control prior to her annual on 5/28/21  Voicemail box is full

## 2021-05-12 DIAGNOSIS — Z30.41 ENCOUNTER FOR SURVEILLANCE OF CONTRACEPTIVE PILLS: ICD-10-CM

## 2021-05-13 RX ORDER — NORETHINDRONE ACETATE AND ETHINYL ESTRADIOL 1; 20 MG/1; UG/1
TABLET ORAL
Qty: 21 TABLET | Refills: 1 | Status: SHIPPED | OUTPATIENT
Start: 2021-05-13

## 2021-05-14 DIAGNOSIS — J45.40 MODERATE PERSISTENT ASTHMA WITHOUT COMPLICATION: ICD-10-CM

## 2021-05-14 DIAGNOSIS — Z30.09 UNWANTED FERTILITY: ICD-10-CM

## 2021-05-14 NOTE — TELEPHONE ENCOUNTER
Patient called requesting refill for Junel fe 1-20mg and albuterol 90 mcg sent to [facility]

## 2021-05-17 RX ORDER — NORETHINDRONE ACETATE AND ETHINYL ESTRADIOL 1MG-20(21)
1 KIT ORAL DAILY
Qty: 28 TABLET | Refills: 3 | Status: SHIPPED | OUTPATIENT
Start: 2021-05-17 | End: 2021-10-01

## 2021-05-17 RX ORDER — ALBUTEROL SULFATE 90 UG/1
2 AEROSOL, METERED RESPIRATORY (INHALATION) EVERY 6 HOURS PRN
Qty: 18 G | Refills: 2 | Status: SHIPPED | OUTPATIENT
Start: 2021-05-17 | End: 2021-12-28 | Stop reason: SDUPTHER

## 2021-10-01 DIAGNOSIS — Z30.09 UNWANTED FERTILITY: ICD-10-CM

## 2021-10-01 RX ORDER — NORETHINDRONE ACETATE AND ETHINYL ESTRADIOL AND FERROUS FUMARATE 1MG-20(21)
KIT ORAL
Qty: 28 TABLET | Refills: 3 | Status: SHIPPED | OUTPATIENT
Start: 2021-10-01 | End: 2022-01-21

## 2021-12-08 ENCOUNTER — TELEMEDICINE (OUTPATIENT)
Dept: FAMILY MEDICINE CLINIC | Facility: CLINIC | Age: 26
End: 2021-12-08
Payer: COMMERCIAL

## 2021-12-08 DIAGNOSIS — J02.9 SORE THROAT: Primary | ICD-10-CM

## 2021-12-08 DIAGNOSIS — R09.81 HEAD CONGESTION: ICD-10-CM

## 2021-12-08 PROCEDURE — 99213 OFFICE O/P EST LOW 20 MIN: CPT | Performed by: FAMILY MEDICINE

## 2021-12-08 RX ORDER — AZITHROMYCIN 250 MG/1
TABLET, FILM COATED ORAL
Qty: 6 TABLET | Refills: 0 | Status: SHIPPED | OUTPATIENT
Start: 2021-12-08 | End: 2021-12-13

## 2021-12-28 DIAGNOSIS — J45.40 MODERATE PERSISTENT ASTHMA WITHOUT COMPLICATION: ICD-10-CM

## 2021-12-28 RX ORDER — ALBUTEROL SULFATE 90 UG/1
2 AEROSOL, METERED RESPIRATORY (INHALATION) EVERY 6 HOURS PRN
Qty: 18 G | Refills: 2 | Status: SHIPPED | OUTPATIENT
Start: 2021-12-28

## 2022-01-21 DIAGNOSIS — Z30.09 UNWANTED FERTILITY: ICD-10-CM

## 2022-01-21 RX ORDER — NORETHINDRONE ACETATE AND ETHINYL ESTRADIOL AND FERROUS FUMARATE 1MG-20(21)
KIT ORAL
Qty: 28 TABLET | Refills: 3 | Status: SHIPPED | OUTPATIENT
Start: 2022-01-21 | End: 2022-05-03

## 2022-05-03 DIAGNOSIS — Z30.09 UNWANTED FERTILITY: ICD-10-CM

## 2022-05-03 RX ORDER — NORETHINDRONE ACETATE AND ETHINYL ESTRADIOL AND FERROUS FUMARATE 1MG-20(21)
KIT ORAL
Qty: 28 TABLET | Refills: 3 | Status: SHIPPED | OUTPATIENT
Start: 2022-05-03

## 2022-06-15 ENCOUNTER — TELEMEDICINE (OUTPATIENT)
Dept: FAMILY MEDICINE CLINIC | Facility: CLINIC | Age: 27
End: 2022-06-15
Payer: MEDICARE

## 2022-06-15 DIAGNOSIS — J02.9 SORE THROAT: ICD-10-CM

## 2022-06-15 DIAGNOSIS — R51.9 NONINTRACTABLE HEADACHE, UNSPECIFIED CHRONICITY PATTERN, UNSPECIFIED HEADACHE TYPE: ICD-10-CM

## 2022-06-15 DIAGNOSIS — R05.9 COUGH: Primary | ICD-10-CM

## 2022-06-15 PROCEDURE — 99213 OFFICE O/P EST LOW 20 MIN: CPT | Performed by: FAMILY MEDICINE

## 2022-06-15 RX ORDER — AZITHROMYCIN 250 MG/1
TABLET, FILM COATED ORAL
Qty: 6 TABLET | Refills: 0 | Status: SHIPPED | OUTPATIENT
Start: 2022-06-15 | End: 2022-06-20

## 2022-06-15 NOTE — PROGRESS NOTES
It was my intent to perform this visit via video technology but the patient was not able to do a video connection so the visit was completed via audio telephone only  Virtual Regular Visit    Verification of patient location:    Patient is located in the following state in which I hold an active license [place]      Assessment/Plan:    Problem List Items Addressed This Visit    None     Visit Diagnoses     Cough    -  Primary    Relevant Medications    azithromycin (Zithromax) 250 mg tablet    Sore throat        Relevant Medications    azithromycin (Zithromax) 250 mg tablet    Nonintractable headache, unspecified chronicity pattern, unspecified headache type                   Reason for visit is   Chief Complaint   Patient presents with    Virtual Regular Visit        Encounter provider Natacha Fermin DO    Provider located at [address] [place] [address] [address]  [facility] [address] [phone]      Recent Visits  No visits were found meeting these conditions  Showing recent visits within past 7 days and meeting all other requirements  Today's Visits  Date Type Provider Dept   06/15/22 Telemedicine Natacha Fermin, [address] [facility] Primary Care   Showing today's visits and meeting all other requirements  Future Appointments  No visits were found meeting these conditions  Showing future appointments within next 150 days and meeting all other requirements       The patient was identified by name and date of birth  Jacquie Calvillo Boards was informed that this is a telemedicine visit and that the visit is being conducted through Telephone  My office door was closed  No one else was in the room  She acknowledged consent and understanding of privacy and security of the video platform  The patient has agreed to participate and understands they can discontinue the visit at any time  Patient is aware this is a billable service  Subjective  Darlin Felipe Bush Denman Habermann is a 27 y o  female [phone] amwell cough headache sore throat   [phone] amwell cough headache sore throat, no known exposure to covid 19 and feels she got sick from her daughter, not checked for covid 19  No fever or sob/resp distress  Past Medical History:   Diagnosis Date    38 weeks gestation of pregnancy 2019    Asthma     Albuterol prn    Varicella     as child and immunized       Past Surgical History:   Procedure Laterality Date    NO PAST SURGERIES      [place]  DELIVERY ONLY N/A 2020    Procedure:  SECTION (); Surgeon: Almaz Guzman, ;  Location: [facility];  Service: Obstetrics       Current Outpatient Medications   Medication Sig Dispense Refill    albuterol (PROVENTIL HFA,VENTOLIN HFA) 90 mcg/act inhaler Inhale 2 puffs every 6 (six) hours as needed for wheezing or shortness of breath 18 g 2    azithromycin (Zithromax) 250 mg tablet Take 2 tablets (500 mg total) by mouth daily for 1 day, THEN 1 tablet (250 mg total) daily for 4 days  6 tablet 0    cetirizine (ZyrTEC) 10 mg tablet Take 1 tablet (10 mg total) by mouth daily (Patient taking differently: Take 10 mg by mouth as needed ) 90 tablet 1    citalopram (CeleXA) 10 mg tablet Take 1 tablet (10 mg total) by mouth daily (Patient taking differently: Take 10 mg by mouth as needed ) 30 tablet 5    ferrous sulfate 324 (65 Fe) mg Take 1 tablet (324 mg total) by mouth daily before breakfast 30 tablet 3    ibuprofen (MOTRIN) 600 mg tablet Take 1 tablet (600 mg total) by mouth every 6 (six) hours as needed for mild pain (Patient not taking: Reported on 2020) 30 tablet 0    Junel 1/20 1-20 MG-MCG per tablet TAKE 1 TABLET BY MOUTH EVERY DAY 21 tablet 1    Junel FE  1-20 MG-MCG per tablet TAKE 1 TABLET BY MOUTH EVERY DAY 28 tablet 3     No current facility-administered medications for this visit          Allergies   Allergen Reactions    Shellfish-Derived Products - Food Allergy        Review of Systems   Constitutional: Negative  Negative for fever  HENT: Positive for sore throat  Eyes: Negative  Respiratory: Positive for cough  Negative for shortness of breath  Cardiovascular: Negative  Gastrointestinal: Negative  Endocrine: Negative  Genitourinary: Negative  Musculoskeletal: Negative  Skin: Negative  Allergic/Immunologic: Negative  Neurological: Positive for headaches  Hematological: Negative  Psychiatric/Behavioral: Negative  Video Exam    There were no vitals filed for this visit  Physical Exam  Pulmonary:      Effort: Pulmonary effort is normal  No respiratory distress  Neurological:      General: No focal deficit present  Mental Status: She is alert and oriented to person, place, and time  Psychiatric:         Mood and Affect: Mood normal          Behavior: Behavior normal          Thought Content: Thought content normal          Judgment: Judgment normal           I spent 15 minutes directly with the patient during this visit     Patient Instructions   [phone] amwell cough headache sore throat  Covid non-vaccinated  Rec patient check home covid test  She will call if positive  Tylenol prn headache and sore throat  May use Dimetapp DM cold and cough prn  Stay well hydrated and start abx  VIRTUAL VISIT DISCLAIMER      Jacquie Gannon Ip verbally agrees to participate in [employer]  Pt is aware that [employer] could be limited without vital signs or the ability to perform a full hands-on physical Michael Loge understands she or the provider may request at any time to terminate the video visit and request the patient to seek care or treatment in person

## 2022-06-15 NOTE — PATIENT INSTRUCTIONS
[phone] elisa cough headache sore throat  Covid non-vaccinated  Rec patient check home covid test  She will call if positive  Tylenol prn headache and sore throat  May use Dimetapp DM cold and cough prn  Stay well hydrated and start abx

## 2022-08-17 DIAGNOSIS — Z30.09 UNWANTED FERTILITY: ICD-10-CM

## 2022-08-17 RX ORDER — NORETHINDRONE ACETATE AND ETHINYL ESTRADIOL AND FERROUS FUMARATE 1MG-20(21)
KIT ORAL
Qty: 28 TABLET | Refills: 3 | Status: SHIPPED | OUTPATIENT
Start: 2022-08-17

## 2022-09-29 ENCOUNTER — TELEMEDICINE (OUTPATIENT)
Dept: FAMILY MEDICINE CLINIC | Facility: CLINIC | Age: 27
End: 2022-09-29
Payer: MEDICARE

## 2022-09-29 DIAGNOSIS — J02.9 SORE THROAT: Primary | ICD-10-CM

## 2022-09-29 DIAGNOSIS — R51.9 NONINTRACTABLE HEADACHE, UNSPECIFIED CHRONICITY PATTERN, UNSPECIFIED HEADACHE TYPE: ICD-10-CM

## 2022-09-29 DIAGNOSIS — R05.9 COUGH: ICD-10-CM

## 2022-09-29 PROCEDURE — 99213 OFFICE O/P EST LOW 20 MIN: CPT | Performed by: FAMILY MEDICINE

## 2022-09-29 RX ORDER — AZITHROMYCIN 250 MG/1
TABLET, FILM COATED ORAL
Qty: 6 TABLET | Refills: 0 | Status: SHIPPED | OUTPATIENT
Start: 2022-09-29 | End: 2022-10-04

## 2022-09-29 NOTE — PROGRESS NOTES
It was my intent to perform this visit via video technology but the patient was not able to do a video connection so the visit was completed via audio telephone only  Virtual Regular Visit    Verification of patient location:    Patient is located in the following state in which I hold an active license [place]      Assessment/Plan:    Problem List Items Addressed This Visit    None     Visit Diagnoses     Sore throat    -  Primary    Cough        Nonintractable headache, unspecified chronicity pattern, unspecified headache type                   Reason for visit is   Chief Complaint   Patient presents with    Virtual Regular Visit        Encounter provider Ora Oglesby DO    Provider located at [address] [place] [address] [address] [facility] [address] [phone]      Recent Visits  No visits were found meeting these conditions  Showing recent visits within past 7 days and meeting all other requirements  Today's Visits  Date Type Provider Dept   09/29/22 Telemedicine Ruth Ann Patiño [facility] Primary Care   Showing today's visits and meeting all other requirements  Future Appointments  No visits were found meeting these conditions  Showing future appointments within next 150 days and meeting all other requirements       The patient was identified by name and date of birth  Jacquie RUGGIERO Akash Hoffman was informed that this is a telemedicine visit and that the visit is being conducted through Telephone  My office door was closed  No one else was in the room  She acknowledged consent and understanding of privacy and security of the video platform  The patient has agreed to participate and understands they can discontinue the visit at any time  Patient is aware this is a billable service  Subjective  Jacquie Malone Jesus Hoffman is a 24 y o  female am [facility] [phone]  headache, st, cough x 3-4 days         am well [phone]  headache, st, cough x 3-4 days, daughter also sick  Patient has yet to check for covid at home  No fever or sob/resp distress  Past Medical History:   Diagnosis Date    38 weeks gestation of pregnancy 2019    Asthma     Albuterol prn    Varicella     as child and immunized       Past Surgical History:   Procedure Laterality Date    NO PAST SURGERIES      [place]  DELIVERY ONLY N/A 2020    Procedure:  SECTION (); Surgeon: Beulah Lee DO;  Location: [facility];  Service: Obstetrics       Current Outpatient Medications   Medication Sig Dispense Refill    albuterol (PROVENTIL HFA,VENTOLIN HFA) 90 mcg/act inhaler Inhale 2 puffs every 6 (six) hours as needed for wheezing or shortness of breath 18 g 2    cetirizine (ZyrTEC) 10 mg tablet Take 1 tablet (10 mg total) by mouth daily (Patient taking differently: Take 10 mg by mouth as needed ) 90 tablet 1    citalopram (CeleXA) 10 mg tablet Take 1 tablet (10 mg total) by mouth daily (Patient taking differently: Take 10 mg by mouth as needed ) 30 tablet 5    ferrous sulfate 324 (65 Fe) mg Take 1 tablet (324 mg total) by mouth daily before breakfast 30 tablet 3    ibuprofen (MOTRIN) 600 mg tablet Take 1 tablet (600 mg total) by mouth every 6 (six) hours as needed for mild pain (Patient not taking: Reported on 2020) 30 tablet 0    Junel 1/20 1-20 MG-MCG per tablet TAKE 1 TABLET BY MOUTH EVERY DAY 21 tablet 1    l FE  1-20 MG-MCG per tablet TAKE 1 TABLET BY MOUTH EVERY DAY 28 tablet 3     No current facility-administered medications for this visit  Allergies   Allergen Reactions    Shellfish-Derived Products - Food Allergy        Review of Systems   Constitutional: Negative  Negative for fever  HENT: Positive for sore throat  Eyes: Negative  Respiratory: Positive for cough  Cardiovascular: Negative  Gastrointestinal: Negative  Endocrine: Negative  Genitourinary: Negative      Musculoskeletal: Negative  Skin: Negative  Allergic/Immunologic: Negative  Neurological: Positive for headaches  Hematological: Negative  Psychiatric/Behavioral: Negative  Video Exam    There were no vitals filed for this visit  Physical Exam  Pulmonary:      Effort: Pulmonary effort is normal  No respiratory distress  Neurological:      General: No focal deficit present  Mental Status: She is alert and oriented to person, place, and time  Psychiatric:         Mood and Affect: Mood normal          Behavior: Behavior normal          Thought Content: Thought content normal          Judgment: Judgment normal         Patient Instructions   am well [phone]  headache, st, cough x 3-4 days send work note to [facility] to be out until Monday  Patient will check home covid test and call if positive  Stay well hydrated  May use Dimetapp DM cold and cough prn  May use Tylenol prn pain or fever  Call if worse         I spent 15 minutes directly with the patient during this visit

## 2022-09-29 NOTE — PATIENT INSTRUCTIONS
am well [phone]  headache, st, cough x 3-4 days send work note to [employer] to be out until Monday  Patient will check home covid test and call if positive  Stay well hydrated  May use Dimetapp DM cold and cough prn  May use Tylenol prn pain or fever  Call if worse

## 2022-11-07 ENCOUNTER — TELEMEDICINE (OUTPATIENT)
Dept: FAMILY MEDICINE CLINIC | Facility: CLINIC | Age: 27
End: 2022-11-07

## 2022-11-07 DIAGNOSIS — J45.40 MODERATE PERSISTENT ASTHMA WITHOUT COMPLICATION: ICD-10-CM

## 2022-11-07 DIAGNOSIS — R05.9 COUGH, UNSPECIFIED TYPE: Primary | ICD-10-CM

## 2022-11-07 DIAGNOSIS — R51.9 NONINTRACTABLE HEADACHE, UNSPECIFIED CHRONICITY PATTERN, UNSPECIFIED HEADACHE TYPE: ICD-10-CM

## 2022-11-07 RX ORDER — ALBUTEROL SULFATE 90 UG/1
2 AEROSOL, METERED RESPIRATORY (INHALATION) EVERY 6 HOURS PRN
Qty: 18 G | Refills: 2 | Status: SHIPPED | OUTPATIENT
Start: 2022-11-07

## 2022-11-07 RX ORDER — AZITHROMYCIN 250 MG/1
TABLET, FILM COATED ORAL
Qty: 6 TABLET | Refills: 0 | Status: SHIPPED | OUTPATIENT
Start: 2022-11-07 | End: 2022-11-12

## 2022-11-07 NOTE — PROGRESS NOTES
It was my intent to perform this visit via video technology but the patient was not able to do a video connection so the visit was completed via audio telephone only  Virtual Regular Visit    Verification of patient location:    Patient is located in the following state in which I hold an active license [place]      Assessment/Plan:    Problem List Items Addressed This Visit        Respiratory    Asthma    Relevant Medications    albuterol (PROVENTIL HFA,VENTOLIN HFA) 90 mcg/act inhaler      Other Visit Diagnoses     Cough, unspecified type    -  Primary    Relevant Medications    azithromycin (Zithromax) 250 mg tablet    Nonintractable headache, unspecified chronicity pattern, unspecified headache type                   Reason for visit is   Chief Complaint   Patient presents with   • Virtual Regular Visit        Encounter provider Consuelo Martinez DO    Provider located at [address] [place] [address] [address] [facility] [address] [phone]      Recent Visits  No visits were found meeting these conditions  Showing recent visits within past 7 days and meeting all other requirements  Today's Visits  Date Type Provider Dept   11/07/22 Telemedicine Consuelo Martinez [address] [place] [address] Primary Care   Showing today's visits and meeting all other requirements  Future Appointments  No visits were found meeting these conditions  Showing future appointments within next 150 days and meeting all other requirements       The patient was identified by name and date of birth  Darlin M Nehemiah Duane was informed that this is a telemedicine visit and that the visit is being conducted through Telephone  My office door was closed  No one else was in the room  She acknowledged consent and understanding of privacy and security of the video platform  The patient has agreed to participate and understands they can discontinue the visit at any time      Patient is aware this is a billable service  Subjective  Jacquie Edmonds Adelina Espino is a 24 y o  female [facility] [phone] fever, headache, sore throat,negative covid test still   request virtual         HPI     Past Medical History:   Diagnosis Date   • 38 weeks gestation of pregnancy 2019   • Asthma     Albuterol prn   • Varicella     as child and immunized       Past Surgical History:   Procedure Laterality Date   • NO PAST SURGERIES     • [place]  DELIVERY ONLY N/A 2020    Procedure:  SECTION (); Surgeon: Almaz Guzman DO;  Location: [facility];  Service: Obstetrics       Current Outpatient Medications   Medication Sig Dispense Refill   • albuterol (PROVENTIL HFA,VENTOLIN HFA) 90 mcg/act inhaler Inhale 2 puffs every 6 (six) hours as needed for wheezing or shortness of breath 18 g 2   • azithromycin (Zithromax) 250 mg tablet Take 2 tablets (500 mg total) by mouth daily for 1 day, THEN 1 tablet (250 mg total) daily for 4 days  6 tablet 0   • cetirizine (ZyrTEC) 10 mg tablet Take 1 tablet (10 mg total) by mouth daily (Patient taking differently: Take 10 mg by mouth as needed ) 90 tablet 1   • citalopram (CeleXA) 10 mg tablet Take 1 tablet (10 mg total) by mouth daily (Patient taking differently: Take 10 mg by mouth as needed ) 30 tablet 5   • ferrous sulfate 324 (65 Fe) mg Take 1 tablet (324 mg total) by mouth daily before breakfast 30 tablet 3   • ibuprofen (MOTRIN) 600 mg tablet Take 1 tablet (600 mg total) by mouth every 6 (six) hours as needed for mild pain (Patient not taking: Reported on 2020) 30 tablet 0   • l  1-20 MG-MCG per tablet TAKE 1 TABLET BY MOUTH EVERY DAY 21 tablet 1   • Junel FE  1-20 MG-MCG per tablet TAKE 1 TABLET BY MOUTH EVERY DAY 28 tablet 3     No current facility-administered medications for this visit  Allergies   Allergen Reactions   • Shellfish-Derived Products - Food Allergy        Review of Systems   Constitutional: Positive for chills and fever  HENT: Negative  Eyes: Negative  Respiratory: Positive for cough and shortness of breath (hx of asthma)  Cardiovascular: Negative  Gastrointestinal: Negative  Endocrine: Negative  Genitourinary: Negative  Musculoskeletal: Negative  Skin: Negative  Allergic/Immunologic: Negative  Neurological: Positive for headaches  Hematological: Negative  Psychiatric/Behavioral: Negative  Video Exam    There were no vitals filed for this visit  Physical Exam  Pulmonary:      Effort: Pulmonary effort is normal  No respiratory distress  Neurological:      General: No focal deficit present  Mental Status: She is alert and oriented to person, place, and time  Psychiatric:         Mood and Affect: Mood normal          Behavior: Behavior normal          Thought Content: Thought content normal          Judgment: Judgment normal         Patient Instructions   Rest and fluids and start abx and Dimetapp DM cold and cough, may take Tylenol for pain or fever         I spent 15 minutes directly with the patient during this visit

## 2022-11-08 ENCOUNTER — TELEPHONE (OUTPATIENT)
Dept: FAMILY MEDICINE CLINIC | Facility: CLINIC | Age: 27
End: 2022-11-08

## 2022-11-08 DIAGNOSIS — R05.9 COUGH, UNSPECIFIED TYPE: Primary | ICD-10-CM

## 2022-11-08 NOTE — TELEPHONE ENCOUNTER
I called Jacquie to clarify she does have symptoms of cough, chest pain from coughing and a fever  She did have virtual yesterday with you

## 2022-11-08 NOTE — PROGRESS NOTES
COVID-19 Outpatient Progress Note    Assessment/Plan:    Problem List Items Addressed This Visit    None     Visit Diagnoses     Cough, unspecified type    -  Primary    Relevant Orders    Covid/Flu- Office Collect         COVID-19 Plan    Encounter provider: Marcelina Edwards DO     Provider located at: [address] [place] [address] [address]  [facility] [address] [phone]     Recent Visits  Date Type Provider Dept   11/07/22 Telemedicine Marcelina Edwards, [address] [facility] [address] Primary Care   Showing recent visits within past 7 days and meeting all other requirements  Today's Visits  Date Type Provider Dept   11/08/22 Telephone John Calhoun, [address] Primary Care   Showing today's visits and meeting all other requirements  Future Appointments  No visits were found meeting these conditions  Showing future appointments within next 150 days and meeting all other requirements     COVID-19 HPI  Lab Results   Component Value Date    SARSCOV2 Negative 03/17/2021       Review of Systems  Current Outpatient Medications on File Prior to Visit   Medication Sig   • albuterol (PROVENTIL HFA,VENTOLIN HFA) 90 mcg/act inhaler Inhale 2 puffs every 6 (six) hours as needed for wheezing or shortness of breath   • azithromycin (Zithromax) 250 mg tablet Take 2 tablets (500 mg total) by mouth daily for 1 day, THEN 1 tablet (250 mg total) daily for 4 days     • cetirizine (ZyrTEC) 10 mg tablet Take 1 tablet (10 mg total) by mouth daily (Patient taking differently: Take 10 mg by mouth as needed )   • citalopram (CeleXA) 10 mg tablet Take 1 tablet (10 mg total) by mouth daily (Patient taking differently: Take 10 mg by mouth as needed )   • ferrous sulfate 324 (65 Fe) mg Take 1 tablet (324 mg total) by mouth daily before breakfast   • ibuprofen (MOTRIN) 600 mg tablet Take 1 tablet (600 mg total) by mouth every 6 (six) hours as needed for mild pain (Patient not taking: Reported on 9/14/2020)   • Junel 1/20 1-20 MG-MCG per tablet TAKE 1 TABLET BY MOUTH EVERY DAY   • Junel FE 1/20 1-20 MG-MCG per tablet TAKE 1 TABLET BY MOUTH EVERY DAY   • [DISCONTINUED] norethindrone (MICRONOR) 0 35 MG tablet Take 1 tablet (0 35 mg total) by mouth daily (Patient not taking: Reported on 9/14/2020)       Objective: There were no vitals taken for this visit       Physical Exam  Jacklyn Espinosa DO

## 2022-11-08 NOTE — TELEPHONE ENCOUNTER
Jacquie called the office she had a virtual appointment yesterday  Pt's mother in law whom lives in their home tested positive for flu  How can they be tested?    Her number is [phone]

## 2022-11-08 NOTE — TELEPHONE ENCOUNTER
I called Jacquie she is aware of Dr Soler's recommendations/ instructions to call if symptoms worsen, hydrate, social distance, handwashing ,etc  Pt will be coming in tomorrow to be tested curbside

## 2022-11-09 ENCOUNTER — TELEPHONE (OUTPATIENT)
Dept: FAMILY MEDICINE CLINIC | Facility: CLINIC | Age: 27
End: 2022-11-09

## 2022-11-09 ENCOUNTER — CLINICAL SUPPORT (OUTPATIENT)
Dept: FAMILY MEDICINE CLINIC | Facility: CLINIC | Age: 27
End: 2022-11-09

## 2022-11-09 DIAGNOSIS — Z11.52 ENCOUNTER FOR SCREENING FOR COVID-19: Primary | ICD-10-CM

## 2022-11-09 NOTE — TELEPHONE ENCOUNTER
Pt was here today for COVID/ FLU swabs and she will need a work note excusing her yesterday, today and tomorrow until result are back  Please advise   Thank you

## 2022-11-10 ENCOUNTER — TELEPHONE (OUTPATIENT)
Dept: FAMILY MEDICINE CLINIC | Facility: CLINIC | Age: 27
End: 2022-11-10

## 2022-11-10 DIAGNOSIS — J10.1 INFLUENZA A: Primary | ICD-10-CM

## 2022-11-10 LAB
FLUAV RNA RESP QL NAA+PROBE: POSITIVE
FLUBV RNA RESP QL NAA+PROBE: NEGATIVE
SARS-COV-2 RNA RESP QL NAA+PROBE: NEGATIVE

## 2022-11-10 RX ORDER — OSELTAMIVIR PHOSPHATE 75 MG/1
75 CAPSULE ORAL EVERY 12 HOURS SCHEDULED
Qty: 10 CAPSULE | Refills: 0 | Status: SHIPPED | OUTPATIENT
Start: 2022-11-10 | End: 2022-11-15

## 2022-12-01 DIAGNOSIS — Z30.09 UNWANTED FERTILITY: ICD-10-CM

## 2022-12-01 RX ORDER — NORETHINDRONE ACETATE AND ETHINYL ESTRADIOL AND FERROUS FUMARATE 1MG-20(21)
KIT ORAL
Qty: 28 TABLET | Refills: 3 | Status: SHIPPED | OUTPATIENT
Start: 2022-12-01

## 2023-01-22 DIAGNOSIS — J45.40 MODERATE PERSISTENT ASTHMA WITHOUT COMPLICATION: ICD-10-CM

## 2023-01-23 RX ORDER — ALBUTEROL SULFATE 90 UG/1
AEROSOL, METERED RESPIRATORY (INHALATION)
Qty: 18 G | Refills: 2 | Status: SHIPPED | OUTPATIENT
Start: 2023-01-23

## 2023-03-11 DIAGNOSIS — Z30.09 UNWANTED FERTILITY: ICD-10-CM

## 2023-03-13 RX ORDER — NORETHINDRONE ACETATE AND ETHINYL ESTRADIOL AND FERROUS FUMARATE 1MG-20(21)
KIT ORAL
Qty: 28 TABLET | Refills: 3 | Status: SHIPPED | OUTPATIENT
Start: 2023-03-13

## 2023-06-25 DIAGNOSIS — Z30.09 UNWANTED FERTILITY: ICD-10-CM

## 2023-06-26 RX ORDER — NORETHINDRONE ACETATE AND ETHINYL ESTRADIOL AND FERROUS FUMARATE 1MG-20(21)
KIT ORAL
Qty: 28 TABLET | Refills: 3 | Status: SHIPPED | OUTPATIENT
Start: 2023-06-26

## 2023-09-28 ENCOUNTER — OFFICE VISIT (OUTPATIENT)
Dept: FAMILY MEDICINE CLINIC | Facility: CLINIC | Age: 28
End: 2023-09-28
Payer: MEDICARE

## 2023-09-28 VITALS
RESPIRATION RATE: 16 BRPM | DIASTOLIC BLOOD PRESSURE: 60 MMHG | HEART RATE: 88 BPM | HEIGHT: 62 IN | OXYGEN SATURATION: 98 % | SYSTOLIC BLOOD PRESSURE: 104 MMHG | TEMPERATURE: 97 F | WEIGHT: 144.8 LBS | BODY MASS INDEX: 26.65 KG/M2

## 2023-09-28 DIAGNOSIS — J02.9 SORE THROAT: ICD-10-CM

## 2023-09-28 DIAGNOSIS — J45.40 MODERATE PERSISTENT ASTHMA WITHOUT COMPLICATION: ICD-10-CM

## 2023-09-28 DIAGNOSIS — R05.9 COUGH, UNSPECIFIED TYPE: Primary | ICD-10-CM

## 2023-09-28 PROCEDURE — 99213 OFFICE O/P EST LOW 20 MIN: CPT | Performed by: FAMILY MEDICINE

## 2023-09-28 RX ORDER — ALBUTEROL SULFATE 90 UG/1
2 AEROSOL, METERED RESPIRATORY (INHALATION) EVERY 6 HOURS PRN
Qty: 18 G | Refills: 2 | Status: SHIPPED | OUTPATIENT
Start: 2023-09-28

## 2023-09-28 RX ORDER — AZITHROMYCIN 250 MG/1
TABLET, FILM COATED ORAL
Qty: 6 TABLET | Refills: 0 | Status: SHIPPED | OUTPATIENT
Start: 2023-09-28 | End: 2023-10-03

## 2023-09-28 NOTE — PROGRESS NOTES
Name: Param Cash      : 1995      MRN: [id number]  Encounter Provider: Rangel Pimentel DO  Encounter Date: 2023   Encounter department: [address] [place] [address]  Chief Complaint   Patient presents with   • Sore Throat     Pt is having sharp pains she swallows. This morning the pain got worse      There are no Patient Instructions on file for this visit. Assessment & Plan     1. Cough, unspecified type    2. Sore throat    3. Moderate persistent asthma without complication           Subjective      Sore Throat (Pt is having sharp pains she swallows. This morning the pain got worse )    Review of Systems   Constitutional: Negative. HENT: Positive for sore throat. Eyes: Negative. Respiratory: Positive for cough. Cardiovascular: Negative. Gastrointestinal: Negative. Endocrine: Negative. Genitourinary: Negative. Musculoskeletal: Negative. Skin: Negative. Allergic/Immunologic: Negative. Neurological: Negative. Hematological: Negative. Psychiatric/Behavioral: Negative.         Current Outpatient Medications on File Prior to Visit   Medication Sig   • albuterol (PROVENTIL HFA,VENTOLIN HFA) 90 mcg/act inhaler TAKE 2 PUFFS BY MOUTH EVERY 6 HOURS AS NEEDED FOR WHEEZE OR FOR SHORTNESS OF BREATH   • Junel FE  1-20 MG-MCG per tablet TAKE 1 TABLET BY MOUTH EVERY DAY   • [DISCONTINUED] cetirizine (ZyrTEC) 10 mg tablet Take 1 tablet (10 mg total) by mouth daily (Patient not taking: Reported on 2023)   • [DISCONTINUED] citalopram (CeleXA) 10 mg tablet Take 1 tablet (10 mg total) by mouth daily (Patient not taking: Reported on 2023)   • [DISCONTINUED] ferrous sulfate 324 (65 Fe) mg Take 1 tablet (324 mg total) by mouth daily before breakfast (Patient not taking: Reported on 2023)   • [DISCONTINUED] ibuprofen (MOTRIN) 600 mg tablet Take 1 tablet (600 mg total) by mouth every 6 (six) hours as needed for mild pain (Patient not taking: Reported on 9/14/2020)   • [DISCONTINUED] Junel 1/20 1-20 MG-MCG per tablet TAKE 1 TABLET BY MOUTH EVERY DAY (Patient not taking: Reported on 9/28/2023)   • [DISCONTINUED] norethindrone (MICRONOR) 0.35 MG tablet Take 1 tablet (0.35 mg total) by mouth daily (Patient not taking: Reported on 9/14/2020)       Objective     /60   Pulse 88   Temp (!) 97 °F (36.1 °C) (Temporal)   Resp 16   Ht 5' 2" (1.575 m)   Wt 65.7 kg (144 lb 12.8 oz)   SpO2 98%   BMI 26.48 kg/m²     Physical Exam  Constitutional:       Appearance: She is well-developed. HENT:      Head: Normocephalic and atraumatic. Right Ear: External ear normal.      Left Ear: External ear normal.      Nose: Nose normal.      Mouth/Throat:      Mouth: Mucous membranes are moist.   Eyes:      Conjunctiva/sclera: Conjunctivae normal.      Pupils: Pupils are equal, round, and reactive to light. Cardiovascular:      Rate and Rhythm: Normal rate and regular rhythm. Heart sounds: Normal heart sounds. Pulmonary:      Effort: Pulmonary effort is normal.      Breath sounds: Normal breath sounds. Musculoskeletal:         General: Normal range of motion. Cervical back: Normal range of motion and neck supple. Skin:     General: Skin is warm and dry. Capillary Refill: Capillary refill takes less than 2 seconds. Neurological:      General: No focal deficit present. Mental Status: She is alert and oriented to person, place, and time. Deep Tendon Reflexes: Reflexes are normal and symmetric.    Psychiatric:         Mood and Affect: Mood normal.         Behavior: Behavior normal.       Nori Rachell, DO

## 2023-10-08 DIAGNOSIS — Z30.09 UNWANTED FERTILITY: ICD-10-CM

## 2023-10-09 RX ORDER — NORETHINDRONE ACETATE AND ETHINYL ESTRADIOL AND FERROUS FUMARATE 1MG-20(21)
KIT ORAL
Qty: 28 TABLET | Refills: 3 | Status: SHIPPED | OUTPATIENT
Start: 2023-10-09

## 2023-12-31 DIAGNOSIS — Z30.09 UNWANTED FERTILITY: ICD-10-CM

## 2024-01-02 RX ORDER — NORETHINDRONE ACETATE AND ETHINYL ESTRADIOL AND FERROUS FUMARATE 1MG-20(21)
KIT ORAL
Qty: 28 TABLET | Refills: 3 | Status: SHIPPED | OUTPATIENT
Start: 2024-01-02

## 2024-10-04 ENCOUNTER — ULTRASOUND (OUTPATIENT)
Dept: OBGYN CLINIC | Facility: MEDICAL CENTER | Age: 29
End: 2024-10-04
Payer: MEDICARE

## 2024-10-04 VITALS
HEIGHT: 62 IN | DIASTOLIC BLOOD PRESSURE: 80 MMHG | WEIGHT: 156.8 LBS | SYSTOLIC BLOOD PRESSURE: 120 MMHG | BODY MASS INDEX: 28.85 KG/M2

## 2024-10-04 DIAGNOSIS — Z36.9 FIRST TRIMESTER SCREENING: Primary | ICD-10-CM

## 2024-10-04 DIAGNOSIS — Z32.01 POSITIVE URINE PREGNANCY TEST: ICD-10-CM

## 2024-10-04 PROCEDURE — 99203 OFFICE O/P NEW LOW 30 MIN: CPT | Performed by: OBSTETRICS & GYNECOLOGY

## 2024-10-04 PROCEDURE — 76801 OB US < 14 WKS SINGLE FETUS: CPT | Performed by: OBSTETRICS & GYNECOLOGY

## 2024-10-04 NOTE — PROGRESS NOTES
"Pregnancy Confirmation Visit  [facility] [address], [place], [place]    Assessment/Plan:  29 y.o.  presenting with missed menses.  Viable pregnancy 9w6d by LMP consistent with ultrasound today.  - Continue/start prenatal vitamin  - Schedule prenatal nursing Intake     Subjective:    CC: Missed period    Jacquie Gardner is a 29 y.o.  who presents with missed menses.  LMP Patient's last menstrual period was 2024 (exact date)..      Patient notes that this pregnancy was planned and desired.  She was not using contraception at the time of conception. She reports she is certain of her LMP and that she has regular menses. She has has no vaginal bleeding since her LMP.    Objective:  /80   Ht 5' 2\" (1.575 m)   Wt 71.1 kg (156 lb 12.8 oz)   LMP 2024 (Exact Date)   BMI 28.68 kg/m²     Physical Exam:  General: Well appearing, no distress  CV: Regular rate  Respiratory: Unlabored breathing  Abdomen: Soft, nontender  Extremities: Without edema  Mood and Affect: Appropriate    Transvaginal Pelvic Ultrasound  [place] IUP  Yolk sac: Present  Fetal Pole: Present  CRL consistent with EGA 10w1d  Cardiac activity: Present   bpm  No adnexal masses appreciated  "

## 2024-10-09 ENCOUNTER — INITIAL PRENATAL (OUTPATIENT)
Dept: OBGYN CLINIC | Facility: MEDICAL CENTER | Age: 29
End: 2024-10-09
Payer: MEDICARE

## 2024-10-09 VITALS
BODY MASS INDEX: 28.85 KG/M2 | DIASTOLIC BLOOD PRESSURE: 64 MMHG | HEIGHT: 62 IN | SYSTOLIC BLOOD PRESSURE: 100 MMHG | WEIGHT: 156.8 LBS

## 2024-10-09 DIAGNOSIS — E84.8 CYSTIC FIBROSIS WITH OTHER MANIFESTATIONS (HCC): ICD-10-CM

## 2024-10-09 DIAGNOSIS — Z31.430 ENCOUNTER OF FEMALE FOR TESTING FOR GENETIC DISEASE CARRIER STATUS FOR PROCREATIVE MANAGEMENT: ICD-10-CM

## 2024-10-09 DIAGNOSIS — R82.71 BACTERIURIA: ICD-10-CM

## 2024-10-09 DIAGNOSIS — Z34.81 PRENATAL CARE, SUBSEQUENT PREGNANCY, FIRST TRIMESTER: Primary | ICD-10-CM

## 2024-10-09 DIAGNOSIS — Z11.59 ENCOUNTER FOR SCREENING FOR OTHER VIRAL DISEASES: ICD-10-CM

## 2024-10-09 PROCEDURE — T1001 NURSING ASSESSMENT/EVALUATN: HCPCS

## 2024-10-09 RX ORDER — DOCUSATE SODIUM 100 MG/1
100 CAPSULE, LIQUID FILLED ORAL 2 TIMES DAILY
COMMUNITY

## 2024-10-09 NOTE — PATIENT INSTRUCTIONS
Congratulations on your pregnancy!  We thank you for allowing us to participate in your care.    NEXT STEPS    Go to the lab to have your prenatal bloodwork completed if you have not already done so.  There is a listing of [facility] and locations in your prenatal folder. You may also visit [email or web address] or call [phone].   Please be aware that some insurance companies may require you to go to a specific lab (ex. [facility] or [facility]). You can verify this by contacting your insurance company.   If you have decided to be screened for CF and SMA genetic testing, these tests require prior authorization and scheduling.  Prior Authorization is not a guarantee of payment. There may be out of pocket expenses that includes copays, deductibles and or coinsurance for your individual plan.  Please call [phone] if our team has not contacted you in 7 business days.  Please have your blood work completed prior to you next prenatal visit.    If you have decided to have genetic testing done at [facility], that will be scheduled by [facility]. You may have already scheduled this appointment.  If not, please call their office to schedule this appointment.  Based on the referral placed by our office, they will know how to schedule you appropriately.    Contact information for [facility] is located in your prenatal folder. The main phone number to their office is [phone].    Return to our office for your first routine prenatal visit.   [facility] has multiple locations. Always check the address of your appointment to ensure you are going to the correct office.       Warning Signs During Pregnancy - If you experience any problems or concerns, call the office directly.  The list below includes warning signs your providers would like you to be aware of.  If you experience any of these at any time during your pregnancy, please call us as soon as possible.   Vaginal bleeding  Sharp abdominal  pain that does not go away  Fever (more than 100.4?F and is not relieved with Tylenol)  Persistent vomiting lasting greater than 24 hours  Chest pain/Shortness of breath  Pain or burning when you urinate     Call the OFFICE [phone] for any questions/emergencies.  At night or on the weekend, calls go through a triage service, please indicate it is an emergency and the DOCTOR on call will be paged.    Remember to only use [email or web address] for none urgent concerns or questions.    Our doctors deliver at [facility] in [place]. The address [place] [address]    Please click on the link below to review our Pregnancy Essential Guide.    [facility] Pregnancy Essentials Guide  [facility] ([email or web address])     Click on the link below to review [facility] Lab locations.  [facility] Lab Locations    [facility] resource  [employer] is a tool to connect you to community resources you may need.

## 2024-10-09 NOTE — PROGRESS NOTES
OB INTAKE INTERVIEW 2024    Patient is 29 y.o. who presents for OB intake at 10w4d.  She is accompanied by her significant other during this encounter.  The father of her baby (Sean) is involved in the pregnancy.      Patient's last menstrual period was 2024 (exact date).  Ultrasound: Measured 10 weeks 4 days on 10/4/2024  Estimated Date of Delivery: 5/3/25 confirmed by dating ultrasound.    Signs/Symptoms of Pregnancy  Current pregnancy symptoms: breast tenderness and fatigue  Constipation YES  Headaches no  Cramping/spotting no  PICA cravings no    Diabetes-  Body mass index is 28.68 kg/m².  If patient has 1 or more, please order early 1 hour GTT  History of GDM no  BMI >35 no  History of PCOS or current metformin use no  History of LGA/macrosomic infant (4000g/9lbs) no    If patient has 2 or more, please order early 1 hour GTT  BMI>30 no  AMA no  First degree relative with type 2 diabetes no  History of chronic HTN, hyperlipidemia, elevated A1C no  High risk race (, , ,  or ) YES    Hypertension- if you answer yes to any of the following, please order baseline preeclampsia labs (cbc, comprehensive metabolic panel, urine protein creatinine ratio, uric acid)  History of of chronic HTN no  History of gestational HTN no  History of preeclampsia, eclampsia, or HELLP syndrome no  History of diabetes no  History of lupus, autoimmune disease, kidney disease no    Thyroid- if yes order TSH with reflex T4  History of thyroid disease no    Bleeding Disorder or Hx of DVT-patient or first degree relative with history of. Order the following if not done previously.   (Factor V, antithrombin III, prothrombin gene mutation, protein C and S Ag, lupus anticoagulant, anticardiolipin, beta-2 glycoprotein)   no    OB/GYN-  History of abnormal pap smear no       Date of last pap smear 2019  History of HPV no  History of Herpes/HSV no  History of  other STI (gonorrhea, chlamydia, trich) no  History of prior  no  History of prior  YES  History of  delivery prior to 36 weeks 6 days no  History of blood transfusion no  Ok for blood transfusion YES    Substance screening-   History of tobacco use no  Currently using tobacco no  Currently using alcohol no  Presently using drugs no  Past drug use  no  IV drug use- no  Partner drug use no  Parent/Family drug use no  Substance Use Screen Level - no risk    MRSA Screening-   Does the pt have a hx of MRSA? no    Immunizations:  Influenza vaccine given this season NO - Considering next ob visit.  Discussed Tdap vaccine YES   COVID Vaccine NO - declined    Genetic/[facility]-  Do you or your partner have a history of any of the following in yourselves or first degree relatives?  Cystic fibrosis no  Spinal muscular atrophy no  Hemoglobinopathy/Sickle Cell/Thalassemia no  Fragile X Intellectual Disability no    If no, discuss Carrier Screening being completed once in a lifetime as a standard of care lab test. Place orders for Cystic Fibrosis Gene Test ([id number]) and Spinal Muscular Atrophy DNA ([id number]).  Patient does desire testing for Cystic Fibrosis and Spinal Muscular Atrophy.  Orders placed.    Appointment for Nuchal Translucency Ultrasound at [facility] is scheduled for 10/24.    Interview education  [facility] Pregnancy Essentials Book reviewed, discussed and attached to their AVS YES     Prenatal lab work scripts YES    Extra labs ordered: Cystic Fibrosis gene test and Spinal muscular atrophy DNA    Aspirin/Preeclampsia Screen    Risk Level Risk Factor Recommendation   LOW Prior Uncomplicated full-term delivery YES No Aspirin recommendation        MODERATE Nulliparity no Recommend low-dose aspirin if     BMI>30 no 2 or more moderate risk factors    Family History Preeclampsia (mother/sister) no     35yr old or greater no      or Low Socioeconomic no     IVF Pregnancy  no     Personal History Risks  (low birth weight, prior adverse preg outcome, >10yr preg interval) no         HIGH History of Preeclampsia no Recommend low-dose aspirin if     Multifetal gestation no 1 or more high risk factors    Chronic HTN no     Type 1 or 2 Diabetes no     Renal Disease no     Autoimmune Disease  no      Contraindications to ASA therapy:  NSAID/ ASA allergy: no  Nasal polyps: no  Asthma with history of ASA induced bronchospasm: no  Relative contraindications:  History of GI bleed: no  Active peptic ulcer disease: no  Severe hepatic dysfunction: no    Patient does not meet recommendation to take ASA 162mg during this pregnancy from 12-36wks to lower her risk of preeclampsia.      The patient has a history now or in prior pregnancy notable for:  1LTCS - maternal request    Details that I feel the provider should be aware of: Jacquie came in for her OB intake at 10w4d. Patient c/o breast tenderness, fatigue and constipation. Safe medications to take during pregnancy and warning signs reviewed. Patient with h/o 1LTCS in 2020 - maternal request after getting tired from pushing. Patient considering repeat c/s and tubal ligation. Prenatal panel ordered. Patient has [facility] scheduled with [facility] on 10/24.    PN1 visit scheduled. The patient was oriented to our practice, the navigator role, reviewed delivering physicians and [facility] for delivery. All questions were answered.    Interviewed by: Luis Perez RN

## 2024-10-17 ENCOUNTER — APPOINTMENT (OUTPATIENT)
Dept: LAB | Facility: MEDICAL CENTER | Age: 29
End: 2024-10-17
Payer: MEDICARE

## 2024-10-17 DIAGNOSIS — E84.8 CYSTIC FIBROSIS WITH OTHER MANIFESTATIONS (HCC): ICD-10-CM

## 2024-10-17 DIAGNOSIS — Z34.81 PRENATAL CARE, SUBSEQUENT PREGNANCY, FIRST TRIMESTER: ICD-10-CM

## 2024-10-17 DIAGNOSIS — R82.71 BACTERIURIA: ICD-10-CM

## 2024-10-17 DIAGNOSIS — Z31.430 ENCOUNTER OF FEMALE FOR TESTING FOR GENETIC DISEASE CARRIER STATUS FOR PROCREATIVE MANAGEMENT: ICD-10-CM

## 2024-10-17 DIAGNOSIS — Z11.59 ENCOUNTER FOR SCREENING FOR OTHER VIRAL DISEASES: ICD-10-CM

## 2024-10-17 LAB
ABO GROUP BLD: NORMAL
AMORPH URATE CRY URNS QL MICRO: ABNORMAL
BACTERIA UR QL AUTO: ABNORMAL /HPF
BASOPHILS # BLD AUTO: 0.05 THOUSANDS/ΜL (ref 0–0.1)
BASOPHILS NFR BLD AUTO: 1 % (ref 0–1)
BILIRUB UR QL STRIP: NEGATIVE
BLD GP AB SCN SERPL QL: NEGATIVE
CLARITY UR: CLEAR
COLOR UR: ABNORMAL
EOSINOPHIL # BLD AUTO: 0.12 THOUSAND/ΜL (ref 0–0.61)
EOSINOPHIL NFR BLD AUTO: 2 % (ref 0–6)
ERYTHROCYTE [DISTWIDTH] IN BLOOD BY AUTOMATED COUNT: 13.2 % (ref 11.6–15.1)
GLUCOSE UR STRIP-MCNC: NEGATIVE MG/DL
HBV SURFACE AB SER-ACNC: 7.21 MIU/ML
HBV SURFACE AG SER QL: NORMAL
HCT VFR BLD AUTO: 39.9 % (ref 34.8–46.1)
HCV AB SER QL: NORMAL
HGB BLD-MCNC: 12.9 G/DL (ref 11.5–15.4)
HGB UR QL STRIP.AUTO: NEGATIVE
HIV 1+2 AB+HIV1 P24 AG SERPL QL IA: NORMAL
HIV 2 AB SERPL QL IA: NORMAL
HIV1 AB SERPL QL IA: NORMAL
HIV1 P24 AG SERPL QL IA: NORMAL
IMM GRANULOCYTES # BLD AUTO: 0.02 THOUSAND/UL (ref 0–0.2)
IMM GRANULOCYTES NFR BLD AUTO: 0 % (ref 0–2)
KETONES UR STRIP-MCNC: NEGATIVE MG/DL
LEUKOCYTE ESTERASE UR QL STRIP: NEGATIVE
LYMPHOCYTES # BLD AUTO: 1.82 THOUSANDS/ΜL (ref 0.6–4.47)
LYMPHOCYTES NFR BLD AUTO: 28 % (ref 14–44)
MCH RBC QN AUTO: 28.9 PG (ref 26.8–34.3)
MCHC RBC AUTO-ENTMCNC: 32.3 G/DL (ref 31.4–37.4)
MCV RBC AUTO: 90 FL (ref 82–98)
MONOCYTES # BLD AUTO: 0.41 THOUSAND/ΜL (ref 0.17–1.22)
MONOCYTES NFR BLD AUTO: 6 % (ref 4–12)
NEUTROPHILS # BLD AUTO: 4.07 THOUSANDS/ΜL (ref 1.85–7.62)
NEUTS SEG NFR BLD AUTO: 63 % (ref 43–75)
NITRITE UR QL STRIP: POSITIVE
NON-SQ EPI CELLS URNS QL MICRO: ABNORMAL /HPF
NRBC BLD AUTO-RTO: 0 /100 WBCS
PH UR STRIP.AUTO: 7 [PH]
PLATELET # BLD AUTO: 329 THOUSANDS/UL (ref 149–390)
PMV BLD AUTO: 10.3 FL (ref 8.9–12.7)
PROT UR STRIP-MCNC: ABNORMAL MG/DL
RBC # BLD AUTO: 4.46 MILLION/UL (ref 3.81–5.12)
RBC #/AREA URNS AUTO: ABNORMAL /HPF
RH BLD: POSITIVE
RUBV IGG SERPL IA-ACNC: 15.6 IU/ML
SP GR UR STRIP.AUTO: 1.02 (ref 1–1.03)
SPECIMEN EXPIRATION DATE: NORMAL
TREPONEMA PALLIDUM IGG+IGM AB [PRESENCE] IN SERUM OR PLASMA BY IMMUNOASSAY: NORMAL
UROBILINOGEN UR STRIP-ACNC: <2 MG/DL
WBC # BLD AUTO: 6.49 THOUSAND/UL (ref 4.31–10.16)
WBC #/AREA URNS AUTO: ABNORMAL /HPF

## 2024-10-17 PROCEDURE — 86901 BLOOD TYPING SEROLOGIC RH(D): CPT

## 2024-10-17 PROCEDURE — 86900 BLOOD TYPING SEROLOGIC ABO: CPT

## 2024-10-17 PROCEDURE — 86762 RUBELLA ANTIBODY: CPT

## 2024-10-17 PROCEDURE — 86850 RBC ANTIBODY SCREEN: CPT

## 2024-10-17 PROCEDURE — 87340 HEPATITIS B SURFACE AG IA: CPT

## 2024-10-17 PROCEDURE — 85025 COMPLETE CBC W/AUTO DIFF WBC: CPT

## 2024-10-17 PROCEDURE — 86803 HEPATITIS C AB TEST: CPT

## 2024-10-17 PROCEDURE — 87389 HIV-1 AG W/HIV-1&-2 AB AG IA: CPT

## 2024-10-17 PROCEDURE — 36415 COLL VENOUS BLD VENIPUNCTURE: CPT

## 2024-10-17 PROCEDURE — 86706 HEP B SURFACE ANTIBODY: CPT

## 2024-10-17 PROCEDURE — 86780 TREPONEMA PALLIDUM: CPT

## 2024-10-17 PROCEDURE — 81001 URINALYSIS AUTO W/SCOPE: CPT

## 2024-10-24 ENCOUNTER — ROUTINE PRENATAL (OUTPATIENT)
Age: 29
End: 2024-10-24
Payer: MEDICARE

## 2024-10-24 ENCOUNTER — TELEPHONE (OUTPATIENT)
Dept: OBGYN CLINIC | Facility: CLINIC | Age: 29
End: 2024-10-24

## 2024-10-24 ENCOUNTER — APPOINTMENT (OUTPATIENT)
Dept: LAB | Facility: HOSPITAL | Age: 29
End: 2024-10-24
Payer: MEDICARE

## 2024-10-24 VITALS
HEART RATE: 86 BPM | SYSTOLIC BLOOD PRESSURE: 118 MMHG | DIASTOLIC BLOOD PRESSURE: 58 MMHG | HEIGHT: 62 IN | BODY MASS INDEX: 29.4 KG/M2 | WEIGHT: 159.8 LBS

## 2024-10-24 DIAGNOSIS — Z3A.12 12 WEEKS GESTATION OF PREGNANCY: ICD-10-CM

## 2024-10-24 DIAGNOSIS — Z36.9 FIRST TRIMESTER SCREENING: ICD-10-CM

## 2024-10-24 DIAGNOSIS — Z33.1 PREGNANT STATE, INCIDENTAL: ICD-10-CM

## 2024-10-24 DIAGNOSIS — Z34.81 PRENATAL CARE, SUBSEQUENT PREGNANCY, FIRST TRIMESTER: ICD-10-CM

## 2024-10-24 DIAGNOSIS — Z34.81 PRENATAL CARE, SUBSEQUENT PREGNANCY, FIRST TRIMESTER: Primary | ICD-10-CM

## 2024-10-24 DIAGNOSIS — Z36.0 ENCOUNTER FOR ANTENATAL SCREENING FOR CHROMOSOMAL ANOMALIES: Primary | ICD-10-CM

## 2024-10-24 LAB
AMORPH URATE CRY URNS QL MICRO: NORMAL /HPF
BACTERIA UR QL AUTO: NORMAL /HPF
BILIRUB UR QL STRIP: NEGATIVE
CLARITY UR: CLEAR
COLOR UR: NORMAL
GLUCOSE UR STRIP-MCNC: NEGATIVE MG/DL
HGB UR QL STRIP.AUTO: NEGATIVE
KETONES UR STRIP-MCNC: NEGATIVE MG/DL
LEUKOCYTE ESTERASE UR QL STRIP: NEGATIVE
NITRITE UR QL STRIP: NEGATIVE
NON-SQ EPI CELLS URNS QL MICRO: NORMAL /HPF
PH UR STRIP.AUTO: 7 [PH]
PROT UR STRIP-MCNC: NEGATIVE MG/DL
RBC #/AREA URNS AUTO: NORMAL /HPF
SP GR UR STRIP.AUTO: 1.01 (ref 1–1.04)
UROBILINOGEN UA: NEGATIVE MG/DL
WBC #/AREA URNS AUTO: NORMAL /HPF

## 2024-10-24 PROCEDURE — 99243 OFF/OP CNSLTJ NEW/EST LOW 30: CPT | Performed by: OBSTETRICS & GYNECOLOGY

## 2024-10-24 PROCEDURE — 81001 URINALYSIS AUTO W/SCOPE: CPT

## 2024-10-24 PROCEDURE — 76813 OB US NUCHAL MEAS 1 GEST: CPT | Performed by: OBSTETRICS & GYNECOLOGY

## 2024-10-24 PROCEDURE — 36415 COLL VENOUS BLD VENIPUNCTURE: CPT | Performed by: OBSTETRICS & GYNECOLOGY

## 2024-10-24 PROCEDURE — 87086 URINE CULTURE/COLONY COUNT: CPT

## 2024-10-24 NOTE — PROGRESS NOTES
Patient chose to have [facility] [id number] Non-Invasive Prenatal Screen [id number] PLUS w/ SCA, WITH fetal sex (per pt request).  Patient choose to be billed through insurance.     Patient given brochure and is aware [facility] will contact patient's insurance and coordinate coverage.  Provided [facility] contact information. General inquiries [phone], Cost estimates [phone] and [facility] Billing [phone]. Website [email or web address].     Blood collection tubes labeled with patient identifiers (name, medical record number, and date of birth).     Filled out [facility] order form. Patient chose to have blood drawn in our office at time of visit. NIPS was drawn by [facility]. .    If patient chose to have blood work drawn at a [facility] lab we requested patient notify MFM (via phone call or [employer] message) when blood collected so office can follow up on results.       Maternal Fetal Medicine will have results in approximately 5-7 business days and will call patient or notify via [employer].  Patient aware viewing lab result online will reveal fetal sex if ordered.    Patient verbalized understanding of all instructions and no questions at this time.

## 2024-10-24 NOTE — LETTER
"   Date: 10/24/2024    Belkis Grijalva MD  [address] [place] [address]  [address] [place] [address]    Patient: Jacquie Gardner   YOB: 1995   Date of Visit: 10/24/2024   Gestational age 12w5d   Nature of this communication: Routine       This patient was seen recently in our  office.  Please see ultrasound report under \"OB Procedures\" tab.  Please don't hesitate to contact our office with any concerns or questions.      Sincerely,      Paty Verma MD  Attending Physician, Maternal-Fetal Medicine  [facility]      "

## 2024-10-24 NOTE — PROGRESS NOTES
"[facility]: Ms. Param Gardner was seen today for nuchal translucency ultrasound.  See ultrasound report under \"OB Procedures\" tab.      MDM:   I. Diagnoses/Problems addressed:  Self limited or minor problem: uncomplicated pregnancy  II.  Data: I ordered the following tests: NIPS.  III.  Risk of morbidity: Low    Please don't hesitate to contact our office with any concerns or questions.  -Paty Verma MD      "

## 2024-10-24 NOTE — TELEPHONE ENCOUNTER
"Spoke with Rebecca from  lab outreach and she states the urine culture was \"missed\" and unable to run it any longer since sample was already discarded. Patient would need to go to the lab again for recollection. Called patient to inform and left message on  to return call. Orders for recollection placed.    ----- Message from Belkis Grijalva MD sent at 10/23/2024  5:56 PM EDT -----  Urine culture was not performed, even though it was ordered with her prenatal panel.   Can you please call the lab to see if they can add it. Patient had nitrites in her urine. Cultures needed for treament.  "

## 2024-10-24 NOTE — TELEPHONE ENCOUNTER
Patient returned call, informed of response regarding urine testing not completed at the lab. Patient states she will go to the lab either today or tomorrow to provide another sample. No further questions.

## 2024-10-25 PROBLEM — O34.219 PREVIOUS CESAREAN SECTION COMPLICATING PREGNANCY: Status: ACTIVE | Noted: 2020-01-29

## 2024-10-25 PROBLEM — Z30.09 GENERAL COUNSELING AND ADVICE ON FEMALE CONTRACEPTION: Status: RESOLVED | Noted: 2020-05-27 | Resolved: 2024-10-25

## 2024-10-25 PROBLEM — O99.511 ASTHMA AFFECTING PREGNANCY IN FIRST TRIMESTER: Status: ACTIVE | Noted: 2024-10-25

## 2024-10-25 PROBLEM — J45.909 ASTHMA AFFECTING PREGNANCY IN FIRST TRIMESTER: Status: ACTIVE | Noted: 2024-10-25

## 2024-10-25 NOTE — PROGRESS NOTES
Assessment & Plan  13 weeks gestation of pregnancy  - Continue prenatal vitamin daily  -Completed  materniT 21 for genetic screening. Will discuss MSAFP at next visit  - PNC follow-up 24.    -  Weight gain in pregnancy-Who BMI recommend 15-25 lb . Healthy diet and daily exercise reviewed and encouraged  - Unit regimen reviewed visit every 4 weeks until 28 weeks, every 2 weeks until 36 weeks and then weekly until delivery.    - Vaccines in Pregnancy      - TDAP vaccine after 27 gestational weeks     - RSV vaccine between 32-36.6 gestational weeks. September- January      - Reviewed with patient  Pregnancy with COVID infection is considered  high risk and can have poor outcome including  delivery, more severe infection.  therefore  pregnant patients are recommended to get  COVID-19 vaccination. Written information provided     - influenza October- March patient considering vaccination  -  Common discomforts of pregnancy and precautions reviewed.  Signs and symptoms to report reviewed  Previous  section complicating pregnancy  -Desires repeat  section  -Reviewed with patient will be done around 39 gestational weeks.  Patient verbalizes understanding  Asthma affecting pregnancy in first trimester  -Albuterol inhaler as needed  Cervical cancer screening  -Pap smear collected  Screening examination for STI  -Gonorrhea/chlamydia collected    RTO 4 weeks f/u influenza vaccine & MSAFP     Prenatal H&P     Denies loss of fluid, vaginal discharge, vaginal bleeding  and abdominal pain. Not feeling fetal movement. Tolerating PNV  well.   Prenatal panel reviewed significant for hepatitis B nonimmune and nitrate positive urinalysis.  Repeat urinalysis/urine culture resulted negative for infection.  Completed  MaterniT 21 for genetic screening. Planned pregnancy.     OB history:     G1 - 20 term  female 7lb 7.2 oz; complicated by failure to descend        G2- current    Dating:     LMP -  24 JESE 5/3/25     US on 10/4/24 @  10w 1d JESE 25  Working JESE 5/3/25     Surgical history:       Past Surgical History:   Procedure Laterality Date    DC  DELIVERY ONLY N/A 2020    Procedure:  SECTION ();  Surgeon: Almaz Guzman DO;  Location: [facility];  Service: Obstetrics       Medical History:      Past Medical History:   Diagnosis Date    Asthma     Albuterol prn    Varicella     as child and immunized       Social history:     Alcohol/ tobacco/ illicit drug social alcohol use prior to pregnancy/denies drug or tobacco use     Denies history of STD/STI     Work/ housing/ support  Homemaker/ house- stable/  FOB, 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 She denies a family history of inheritable conditions such as physical or intellectual disabilities, birth defects, blood disorders, heart or neural tube defects. She has never had MRSA. She denies recent travel. Going to [place] tomorrow.     Patient Plans   - Feeding breast   - Contraception permanent female sterilization  - Aware office delivers at [facility]. Reviewed depending on complaint and gestational age may recommend evaluation at [facility]

## 2024-10-27 LAB — BACTERIA UR CULT: NORMAL

## 2024-10-28 LAB
CFDNA.FET/CFDNA.TOTAL SFR FETUS: NORMAL %
CITATION REF LAB TEST: NORMAL
FET 13+18+21+X+Y ANEUP PLAS.CFDNA: NEGATIVE
FET CHR 21 TS PLAS.CFDNA QL: NEGATIVE
FET CHR 21 TS PLAS.CFDNA QL: NEGATIVE
FET MS X RISK WBC.DNA+CFDNA QL: NOT DETECTED
FET SEX PLAS.CFDNA DOSAGE CFDNA: NORMAL
FET TS 13 RISK PLAS.CFDNA QL: NEGATIVE
FET X + Y ANEUP RISK PLAS.CFDNA SEQ-IMP: NOT DETECTED
GA EST FROM CONCEPTION DATE: NORMAL D
GESTATIONAL AGE > 9:: YES
LAB DIRECTOR NAME PROVIDER: NORMAL
LAB DIRECTOR NAME PROVIDER: NORMAL
LABORATORY COMMENT REPORT: NORMAL
LIMITATIONS OF THE TEST: NORMAL
NEGATIVE PREDICTIVE VALUE: NORMAL
PERFORMANCE CHARACTERISTICS: NORMAL
POSITIVE PREDICTIVE VALUE: NORMAL
REF LAB TEST METHOD: NORMAL
SL AMB NOTE:: NORMAL
TEST PERFORMANCE INFO SPEC: NORMAL

## 2024-10-29 ENCOUNTER — INITIAL PRENATAL (OUTPATIENT)
Dept: OBGYN CLINIC | Facility: CLINIC | Age: 29
End: 2024-10-29
Payer: MEDICARE

## 2024-10-29 VITALS — DIASTOLIC BLOOD PRESSURE: 68 MMHG | SYSTOLIC BLOOD PRESSURE: 115 MMHG

## 2024-10-29 DIAGNOSIS — O99.511 ASTHMA AFFECTING PREGNANCY IN FIRST TRIMESTER: ICD-10-CM

## 2024-10-29 DIAGNOSIS — Z11.3 SCREENING EXAMINATION FOR STI: ICD-10-CM

## 2024-10-29 DIAGNOSIS — O34.219 PREVIOUS CESAREAN SECTION COMPLICATING PREGNANCY: Primary | ICD-10-CM

## 2024-10-29 DIAGNOSIS — J45.909 ASTHMA AFFECTING PREGNANCY IN FIRST TRIMESTER: ICD-10-CM

## 2024-10-29 DIAGNOSIS — Z3A.13 13 WEEKS GESTATION OF PREGNANCY: ICD-10-CM

## 2024-10-29 DIAGNOSIS — Z12.4 CERVICAL CANCER SCREENING: ICD-10-CM

## 2024-10-29 PROBLEM — Z30.41 ENCOUNTER FOR SURVEILLANCE OF CONTRACEPTIVE PILLS: Status: RESOLVED | Noted: 2020-05-27 | Resolved: 2024-10-29

## 2024-10-29 PROCEDURE — 87591 N.GONORRHOEAE DNA AMP PROB: CPT | Performed by: NURSE PRACTITIONER

## 2024-10-29 PROCEDURE — G0145 SCR C/V CYTO,THINLAYER,RESCR: HCPCS | Performed by: NURSE PRACTITIONER

## 2024-10-29 PROCEDURE — 87491 CHLMYD TRACH DNA AMP PROBE: CPT | Performed by: NURSE PRACTITIONER

## 2024-10-29 PROCEDURE — 99213 OFFICE O/P EST LOW 20 MIN: CPT | Performed by: NURSE PRACTITIONER

## 2024-10-29 NOTE — ASSESSMENT & PLAN NOTE
-Desires repeat  section  -Reviewed with patient will be done around 39 gestational weeks.  Patient verbalizes understanding

## 2024-10-30 LAB
C TRACH DNA SPEC QL NAA+PROBE: NEGATIVE
N GONORRHOEA DNA SPEC QL NAA+PROBE: NEGATIVE

## 2024-11-01 LAB
LAB AP GYN PRIMARY INTERPRETATION: NORMAL
LAB AP LMP: NORMAL
Lab: NORMAL

## 2024-11-04 ENCOUNTER — TELEPHONE (OUTPATIENT)
Age: 29
End: 2024-11-04

## 2024-11-04 LAB — SCAN RESULT: NORMAL

## 2024-11-04 NOTE — TELEPHONE ENCOUNTER
----- Message from Belkis Grijalva MD sent at 11/2/2024  3:47 PM EDT -----  Please call Jacquie and have her repeat her urine culture. There were contaminants in her last specimen  Belkis Grijalva MD

## 2024-11-05 DIAGNOSIS — Z36.9 FIRST TRIMESTER SCREENING: Primary | ICD-10-CM

## 2024-11-07 NOTE — TELEPHONE ENCOUNTER
Left message request call back to review message per Dr. Grijalva. CTS 3rd attempt. Message to office to mail letter regarding same.

## 2024-11-26 PROBLEM — Z3A.17 17 WEEKS GESTATION OF PREGNANCY: Status: ACTIVE | Noted: 2024-11-26

## 2024-11-26 PROBLEM — O99.512 ASTHMA AFFECTING PREGNANCY IN SECOND TRIMESTER: Status: ACTIVE | Noted: 2024-10-25

## 2024-11-26 PROBLEM — Z78.9 NONIMMUNE TO HEPATITIS B VIRUS: Status: ACTIVE | Noted: 2024-11-26

## 2024-11-26 NOTE — ASSESSMENT & PLAN NOTE
Continue prenatal vitamin daily  Follow-up with  center scheduled 24  Common discomforts of pregnancy and precautions reviewed.  Signs and symptoms to report reviewed.    Orders:    influenza vaccine preservative-free 0.5 mL IM (Fluzone, Afluria, Fluarix, Flulaval)

## 2024-11-26 NOTE — ASSESSMENT & PLAN NOTE
Continue inhaler as needed.  Should call office if using rescue inhaler more than 2-3 times per week consistently

## 2024-11-27 ENCOUNTER — APPOINTMENT (OUTPATIENT)
Dept: LAB | Facility: MEDICAL CENTER | Age: 29
End: 2024-11-27
Payer: MEDICARE

## 2024-11-27 ENCOUNTER — ROUTINE PRENATAL (OUTPATIENT)
Dept: OBGYN CLINIC | Facility: MEDICAL CENTER | Age: 29
End: 2024-11-27
Payer: MEDICARE

## 2024-11-27 VITALS — WEIGHT: 163.5 LBS | BODY MASS INDEX: 29.9 KG/M2 | SYSTOLIC BLOOD PRESSURE: 108 MMHG | DIASTOLIC BLOOD PRESSURE: 58 MMHG

## 2024-11-27 DIAGNOSIS — Z78.9 NONIMMUNE TO HEPATITIS B VIRUS: ICD-10-CM

## 2024-11-27 DIAGNOSIS — O99.512 ASTHMA AFFECTING PREGNANCY IN SECOND TRIMESTER: Primary | ICD-10-CM

## 2024-11-27 DIAGNOSIS — J45.909 ASTHMA AFFECTING PREGNANCY IN SECOND TRIMESTER: Primary | ICD-10-CM

## 2024-11-27 DIAGNOSIS — Z36.9 FIRST TRIMESTER SCREENING: ICD-10-CM

## 2024-11-27 DIAGNOSIS — Z3A.17 17 WEEKS GESTATION OF PREGNANCY: ICD-10-CM

## 2024-11-27 DIAGNOSIS — O34.219 PREVIOUS CESAREAN SECTION COMPLICATING PREGNANCY: ICD-10-CM

## 2024-11-27 PROCEDURE — 99213 OFFICE O/P EST LOW 20 MIN: CPT | Performed by: NURSE PRACTITIONER

## 2024-11-27 PROCEDURE — 90471 IMMUNIZATION ADMIN: CPT | Performed by: NURSE PRACTITIONER

## 2024-11-27 PROCEDURE — 87086 URINE CULTURE/COLONY COUNT: CPT

## 2024-11-27 PROCEDURE — 90656 IIV3 VACC NO PRSV 0.5 ML IM: CPT | Performed by: NURSE PRACTITIONER

## 2024-11-27 NOTE — PROGRESS NOTES
Name: Jacquie Gardner      : 1995      MRN: [id number]  Encounter Provider: TERE Mccormick  Encounter Date: 2024   Enc[facility]  :  Assessment & Plan  Asthma affecting pregnancy in second trimester  Continue inhaler as needed.  Should call office if using rescue inhaler more than 2-3 times per week consistently         17 weeks gestation of pregnancy  Continue prenatal vitamin daily  Follow-up with  center scheduled 24  Common discomforts of pregnancy and precautions reviewed.  Signs and symptoms to report reviewed.    Orders:    influenza vaccine preservative-free 0.5 mL IM (Fluzone, Afluria, Fluarix, Flulaval)    Previous  section complicating pregnancy  Planning repeat  section         Nonimmune to hepatitis B virus  Recommend booster            RTO 4 weeks     History of Present Illness     HPI  Jacquie Gardner is a 29 y.o. female who presents for OB visit.  Denies loss of fluid, vaginal bleeding and abdominal pain.  Confirms movement, flutters.  Tolerating prenatal vitamin well.  Has not needed to use rescue inhaler.  Completing urine culture today.    History obtained from: patient    Review of Systems   Constitutional:  Negative for chills and fever.   Respiratory: Negative.     Cardiovascular: Negative.      Medical History Reviewed by provider this encounter:  Allergies  Meds  Problems     .     Objective   /58   Wt 74.2 kg (163 lb 8 oz)   LMP 2024 (Exact Date)   BMI 29.90 kg/m²      Physical Exam  Constitutional:       Appearance: Normal appearance.   Neurological:      Mental Status: She is alert and oriented to person, place, and time.   Psychiatric:         Mood and Affect: Mood normal.         Behavior: Behavior normal.

## 2024-11-29 LAB — BACTERIA UR CULT: NORMAL

## 2024-12-03 ENCOUNTER — TELEPHONE (OUTPATIENT)
Dept: OBGYN CLINIC | Facility: MEDICAL CENTER | Age: 29
End: 2024-12-03

## 2024-12-03 NOTE — TELEPHONE ENCOUNTER
OB nurse navigator attempted to get in contact with patient for 2nd Trimester call but unable to complete call at this time. Patient unavailable. Left message on vm to return call. [employer] message sent.

## 2024-12-17 ENCOUNTER — ROUTINE PRENATAL (OUTPATIENT)
Age: 29
End: 2024-12-17
Payer: MEDICARE

## 2024-12-17 VITALS
HEART RATE: 84 BPM | BODY MASS INDEX: 30.66 KG/M2 | WEIGHT: 166.6 LBS | DIASTOLIC BLOOD PRESSURE: 60 MMHG | HEIGHT: 62 IN | SYSTOLIC BLOOD PRESSURE: 112 MMHG

## 2024-12-17 DIAGNOSIS — O34.219 PREVIOUS CESAREAN SECTION COMPLICATING PREGNANCY: ICD-10-CM

## 2024-12-17 DIAGNOSIS — O44.02 PLACENTA PREVIA ANTEPARTUM IN SECOND TRIMESTER: ICD-10-CM

## 2024-12-17 DIAGNOSIS — Z3A.20 20 WEEKS GESTATION OF PREGNANCY: Primary | ICD-10-CM

## 2024-12-17 PROCEDURE — 76811 OB US DETAILED SNGL FETUS: CPT | Performed by: OBSTETRICS & GYNECOLOGY

## 2024-12-17 PROCEDURE — 76817 TRANSVAGINAL US OBSTETRIC: CPT | Performed by: OBSTETRICS & GYNECOLOGY

## 2024-12-17 PROCEDURE — 99214 OFFICE O/P EST MOD 30 MIN: CPT | Performed by: OBSTETRICS & GYNECOLOGY

## 2024-12-17 NOTE — PROGRESS NOTES
A fetal ultrasound was completed. See Ob procedures in Epic for an interpretation and recommendations. Do not hesitate to contact us in [facility] if you have questions.    Fab Patel MD, MSCE  Maternal Fetal Medicine

## 2024-12-17 NOTE — PROGRESS NOTES
Ultrasound Probe Disinfection    A transvaginal ultrasound was performed.   Prior to use, disinfection was performed with High Level Disinfection Process ([employer]).  Probe serial number S1: [id number] was used.    Chasity Smith  12/17/24  12:52 PM

## 2024-12-17 NOTE — LETTER
December 17, 2024     Belkis Grijalva MD  [address] [place] [address]  [address] [place] [address]    Patient: Jacquie Gardner   YOB: 1995   Date of Visit: 12/17/2024       Dear Dr. Grijalva:    Thank you for referring Jacquie Gardner to me for evaluation. Below are my notes for this consultation.    If you have questions, please do not hesitate to call me. I look forward to following your patient along with you.         Sincerely,        Fab Patel MD        CC: No Recipients    Fab Patel MD  12/17/2024  2:09 PM  Sign when Signing Visit  A fetal ultrasound was completed. See Ob procedures in Epic for an interpretation and recommendations. Do not hesitate to contact us in [facility] if you have questions.    Fab Patel MD, MSCE  Maternal Fetal Medicine

## 2024-12-19 ENCOUNTER — TELEPHONE (OUTPATIENT)
Age: 29
End: 2024-12-19

## 2024-12-19 DIAGNOSIS — Z3A.20 20 WEEKS GESTATION OF PREGNANCY: Primary | ICD-10-CM

## 2024-12-19 NOTE — TELEPHONE ENCOUNTER
Patient had called asking if she could get orders for the spina bifida testing. She sad that she had not wanted it when offered before but has since changed her mind. Please advise patient uses the [facility] lab and can be reached at [phone].

## 2024-12-23 ENCOUNTER — APPOINTMENT (OUTPATIENT)
Dept: LAB | Facility: MEDICAL CENTER | Age: 29
End: 2024-12-23
Payer: MEDICARE

## 2024-12-23 ENCOUNTER — ROUTINE PRENATAL (OUTPATIENT)
Dept: OBGYN CLINIC | Facility: MEDICAL CENTER | Age: 29
End: 2024-12-23
Payer: MEDICARE

## 2024-12-23 VITALS — BODY MASS INDEX: 30.98 KG/M2 | DIASTOLIC BLOOD PRESSURE: 60 MMHG | SYSTOLIC BLOOD PRESSURE: 110 MMHG | WEIGHT: 169.4 LBS

## 2024-12-23 DIAGNOSIS — Z3A.21 21 WEEKS GESTATION OF PREGNANCY: Primary | ICD-10-CM

## 2024-12-23 DIAGNOSIS — Z3A.20 20 WEEKS GESTATION OF PREGNANCY: ICD-10-CM

## 2024-12-23 DIAGNOSIS — O34.219 PREVIOUS CESAREAN SECTION COMPLICATING PREGNANCY: ICD-10-CM

## 2024-12-23 PROCEDURE — 82105 ALPHA-FETOPROTEIN SERUM: CPT

## 2024-12-23 PROCEDURE — 36415 COLL VENOUS BLD VENIPUNCTURE: CPT

## 2024-12-23 PROCEDURE — 99213 OFFICE O/P EST LOW 20 MIN: CPT | Performed by: OBSTETRICS & GYNECOLOGY

## 2024-12-23 NOTE — PROGRESS NOTES
Routine Prenatal Visit  [facility] [address], [place], [place]      OB/GYN Prenatal Visit    ASSESSMENT / PLAN:  1. 21 weeks gestation of pregnancy  Assessment & Plan:  NIPT negative  AFP not collected yet advised it needs to be done prior to 22 weeks so do asap     Growth   2. Previous  section complicating pregnancy  Assessment & Plan:  For repeat c section   We discussed steriliation         SUBJECTIVE:  Jacquie Gardner is a 29 y.o.  at 24 here for prenatal visit.  She has no obstetric complaints and denies pelvic pain, cramping/contractions, vaginal bleeding, loss of fluid, or decreased fetal movement.       The following portions of the patient's history were reviewed and updated as appropriate: allergies, current medications, past family history, past medical history, obstetric history, gynecologic history, past social history, past surgical history and problem list.      Objective:  /60   Wt 76.8 kg (169 lb 6.4 oz)   LMP 2024 (Exact Date)   BMI 30.98 kg/m²   Pregravid Weight/BMI: 65.8 kg (145 lb) (BMI 26.51)  Current Weight: 76.8 kg (169 lb 6.4 oz)   Total Weight Gain: 11.1 kg (24 lb 6.4 oz)   Pre-Mechelle Vitals      Flowsheet Row Most Recent Value   Prenatal Assessment    Fetal Heart Rate 140   Movement Present   Prenatal Vitals    Blood Pressure 110/60   Weight - Scale 76.8 kg (169 lb 6.4 oz)   Urine Albumin/Glucose    Dilation/Effacement/Station    Vaginal Drainage    Draining Fluid No   Edema    LLE Edema None   RLE Edema None   Facial Edema None               Physical Exam:    General: Well appearing, no distress  Respiratory: Unlabored breathing  Cardiovascular: Regular rate.  Abdomen: Soft, gravid, nontender  Fundal Height: Appropriate for gestational age.  Extremities: Warm and well perfused.  Non tender.      Obed Ross MD

## 2024-12-28 LAB
2ND TRIMESTER 4 SCREEN SERPL-IMP: NORMAL
AFP ADJ MOM SERPL: 1.21
AFP INTERP AMN-IMP: NORMAL
AFP INTERP SERPL-IMP: NORMAL
AFP INTERP SERPL-IMP: NORMAL
AFP SERPL-MCNC: 74.9 NG/ML
AGE AT DELIVERY: 27.6 YR
GA METHOD: NORMAL
GA: 21.3 WEEKS
IDDM PATIENT QL: NO
MULTIPLE PREGNANCY: NO
NEURAL TUBE DEFECT RISK FETUS: 6301 %

## 2024-12-29 ENCOUNTER — RESULTS FOLLOW-UP (OUTPATIENT)
Dept: OBGYN CLINIC | Facility: MEDICAL CENTER | Age: 29
End: 2024-12-29

## 2025-01-16 ENCOUNTER — ULTRASOUND (OUTPATIENT)
Age: 30
End: 2025-01-16
Payer: MEDICARE

## 2025-01-16 VITALS
HEIGHT: 62 IN | BODY MASS INDEX: 31.76 KG/M2 | DIASTOLIC BLOOD PRESSURE: 66 MMHG | HEART RATE: 88 BPM | WEIGHT: 172.6 LBS | SYSTOLIC BLOOD PRESSURE: 120 MMHG

## 2025-01-16 DIAGNOSIS — Z36.2 ENCOUNTER FOR FOLLOW-UP ULTRASOUND OF FETAL ANATOMY: ICD-10-CM

## 2025-01-16 DIAGNOSIS — Z3A.24 24 WEEKS GESTATION OF PREGNANCY: Primary | ICD-10-CM

## 2025-01-16 DIAGNOSIS — O44.00 PLACENTA PREVIA ANTEPARTUM: ICD-10-CM

## 2025-01-16 DIAGNOSIS — O34.219 PREVIOUS CESAREAN SECTION COMPLICATING PREGNANCY: ICD-10-CM

## 2025-01-16 DIAGNOSIS — Z36.89 ENCOUNTER FOR ULTRASOUND TO ASSESS FETAL GROWTH: ICD-10-CM

## 2025-01-16 PROCEDURE — 76817 TRANSVAGINAL US OBSTETRIC: CPT | Performed by: OBSTETRICS & GYNECOLOGY

## 2025-01-16 PROCEDURE — 99213 OFFICE O/P EST LOW 20 MIN: CPT | Performed by: OBSTETRICS & GYNECOLOGY

## 2025-01-16 PROCEDURE — 76816 OB US FOLLOW-UP PER FETUS: CPT | Performed by: OBSTETRICS & GYNECOLOGY

## 2025-01-16 NOTE — LETTER
"  Date: 2025    Amy Londono MD  [address] [place] [address]    Patient: Jacquie Gardner   YOB: 1995   Date of Visit: 2025   Gestational age 24w5d   Nature of this communication: Routine       This patient was seen recently in our  office.  Please see ultrasound report under \"OB Procedures\" tab.  Please don't hesitate to contact our office with any concerns or questions.      Sincerely,      Rebekah Garcia MD  Attending Physician, Maternal-Fetal Medicine  [facility]       "

## 2025-01-16 NOTE — PROGRESS NOTES
Ultrasound Probe Disinfection    A transvaginal ultrasound was performed.   Prior to use, disinfection was performed with High Level Disinfection Process ([employer]).  Probe serial number S1: [id number] was used.    Chasity Smith  01/16/25  11:28 AM

## 2025-01-16 NOTE — PROGRESS NOTES
"[facility]: Jacquie RUGGIERO Param Gardner was seen today at 24w5d for followup placental location ultrasound with fetal growth measurement.  See ultrasound report under \"OB Procedures\" tab.  Please don't hesitate to contact our office with any concerns or questions.  -Rebekah Garcia MD      "

## 2025-01-20 PROBLEM — Z3A.25 25 WEEKS GESTATION OF PREGNANCY: Status: ACTIVE | Noted: 2024-11-26

## 2025-01-20 NOTE — ASSESSMENT & PLAN NOTE
Continue prenatal vitamin daily  28-week labs ordered  Common discomforts of pregnancy and precautions including  labor reviewed.  Signs and symptoms to report reviewed.  Change

## 2025-01-20 NOTE — PROGRESS NOTES
Name: Jacquie Gardner      : 1995      MRN: [id number]  Encounter Provider: TERE Mccormick  Encounter Date: 2025   Encounter department: [facility]  :  Assessment & Plan  Asthma affecting pregnancy in second trimester  Continue inhaler as needed.  Encouraged to call office if using more than 3 times per week         Previous  section complicating pregnancy           25 weeks gestation of pregnancy  Continue prenatal vitamin daily  28-week labs ordered  Common discomforts of pregnancy and precautions including  labor reviewed.  Signs and symptoms to report reviewed.  Change         RTO 3-4 weeks     History of Present Illness   HPI  Jacquie Gardner is a 29 y.o. female who presents for PN visit      Denies loss of fluid, vaginal bleeding and abdominal pain.  Confirms frequent fetal movement.  Tolerating prenatal vitamin well.    History obtained from: patient    Review of Systems   Constitutional:  Negative for chills and fever.   Respiratory: Negative.     Cardiovascular: Negative.    Genitourinary: Negative.      Medical History Reviewed by provider this encounter:  Allergies  Meds  Problems     .     Objective   LMP 2024 (Exact Date)      Physical Exam  Constitutional:       Appearance: Normal appearance.   Neurological:      Mental Status: She is alert and oriented to person, place, and time.   Psychiatric:         Mood and Affect: Mood normal.         Behavior: Behavior normal.

## 2025-01-21 ENCOUNTER — ROUTINE PRENATAL (OUTPATIENT)
Dept: OBGYN CLINIC | Facility: CLINIC | Age: 30
End: 2025-01-21
Payer: MEDICARE

## 2025-01-21 VITALS — SYSTOLIC BLOOD PRESSURE: 100 MMHG | BODY MASS INDEX: 31.83 KG/M2 | DIASTOLIC BLOOD PRESSURE: 68 MMHG | WEIGHT: 174 LBS

## 2025-01-21 DIAGNOSIS — O34.219 PREVIOUS CESAREAN SECTION COMPLICATING PREGNANCY: ICD-10-CM

## 2025-01-21 DIAGNOSIS — Z3A.25 25 WEEKS GESTATION OF PREGNANCY: ICD-10-CM

## 2025-01-21 DIAGNOSIS — O99.512 ASTHMA AFFECTING PREGNANCY IN SECOND TRIMESTER: Primary | ICD-10-CM

## 2025-01-21 DIAGNOSIS — J45.909 ASTHMA AFFECTING PREGNANCY IN SECOND TRIMESTER: Primary | ICD-10-CM

## 2025-01-21 PROCEDURE — 99213 OFFICE O/P EST LOW 20 MIN: CPT | Performed by: NURSE PRACTITIONER

## 2025-02-03 ENCOUNTER — APPOINTMENT (OUTPATIENT)
Dept: LAB | Facility: HOSPITAL | Age: 30
End: 2025-02-03
Payer: MEDICARE

## 2025-02-03 DIAGNOSIS — Z3A.25 25 WEEKS GESTATION OF PREGNANCY: ICD-10-CM

## 2025-02-03 DIAGNOSIS — O34.219 PREVIOUS CESAREAN SECTION COMPLICATING PREGNANCY: ICD-10-CM

## 2025-02-03 LAB
BASOPHILS # BLD AUTO: 0.04 THOUSANDS/ΜL (ref 0–0.1)
BASOPHILS NFR BLD AUTO: 1 % (ref 0–1)
EOSINOPHIL # BLD AUTO: 0.1 THOUSAND/ΜL (ref 0–0.61)
EOSINOPHIL NFR BLD AUTO: 1 % (ref 0–6)
ERYTHROCYTE [DISTWIDTH] IN BLOOD BY AUTOMATED COUNT: 13.4 % (ref 11.6–15.1)
GLUCOSE 1H P 50 G GLC PO SERPL-MCNC: 141 MG/DL (ref 70–134)
HCT VFR BLD AUTO: 36.9 % (ref 34.8–46.1)
HGB BLD-MCNC: 11.9 G/DL (ref 11.5–15.4)
IMM GRANULOCYTES # BLD AUTO: 0.08 THOUSAND/UL (ref 0–0.2)
IMM GRANULOCYTES NFR BLD AUTO: 1 % (ref 0–2)
LYMPHOCYTES # BLD AUTO: 1.99 THOUSANDS/ΜL (ref 0.6–4.47)
LYMPHOCYTES NFR BLD AUTO: 27 % (ref 14–44)
MCH RBC QN AUTO: 28 PG (ref 26.8–34.3)
MCHC RBC AUTO-ENTMCNC: 32.2 G/DL (ref 31.4–37.4)
MCV RBC AUTO: 87 FL (ref 82–98)
MONOCYTES # BLD AUTO: 0.49 THOUSAND/ΜL (ref 0.17–1.22)
MONOCYTES NFR BLD AUTO: 7 % (ref 4–12)
NEUTROPHILS # BLD AUTO: 4.62 THOUSANDS/ΜL (ref 1.85–7.62)
NEUTS SEG NFR BLD AUTO: 63 % (ref 43–75)
NRBC BLD AUTO-RTO: 0 /100 WBCS
PLATELET # BLD AUTO: 341 THOUSANDS/UL (ref 149–390)
PMV BLD AUTO: 9.4 FL (ref 8.9–12.7)
RBC # BLD AUTO: 4.25 MILLION/UL (ref 3.81–5.12)
WBC # BLD AUTO: 7.32 THOUSAND/UL (ref 4.31–10.16)

## 2025-02-03 PROCEDURE — 36415 COLL VENOUS BLD VENIPUNCTURE: CPT

## 2025-02-03 PROCEDURE — 82950 GLUCOSE TEST: CPT

## 2025-02-03 PROCEDURE — 85025 COMPLETE CBC W/AUTO DIFF WBC: CPT

## 2025-02-03 PROCEDURE — 86780 TREPONEMA PALLIDUM: CPT

## 2025-02-03 NOTE — ASSESSMENT & PLAN NOTE
Continue prenatal vitamin daily  Fetal kick counts reviewed, encouraged daily and written information provided  28-week folder and reviewed.  Considering Tdap vaccine.  Breast pump ordered  Common discomforts of pregnancy and precautions including  labor reviewed.  Signs and symptoms to report reviewed.      Orders:    Glucose JERED 3HR 100GM PREG PTS; Future

## 2025-02-03 NOTE — PROGRESS NOTES
Name: Jacquie Gardner      : 1995      MRN: [id number]  Encounter Provider: TERE Mccormick  Encounter Date: 2025   Encounter department: [facility]  :  Assessment & Plan  27 weeks gestation of pregnancy  Continue prenatal vitamin daily  Fetal kick counts reviewed, encouraged daily and written information provided  28-week folder and reviewed.  Considering Tdap vaccine.  Breast pump ordered  Common discomforts of pregnancy and precautions including  labor reviewed.  Signs and symptoms to report reviewed.      Orders:    Glucose JERED 3HR 100GM PREG PTS; Future      Previous  section complicating pregnancy  Planning repeat  section  Message sent to nurse navigator for scheduling.  Patient prefers 25           Asthma affecting pregnancy in second trimester  Continue inhaler as needed.  Encouraged to call office if using more than 3 times per week           Abnormal glucose tolerance test (GTT) during pregnancy, antepartum  1 hour ; 3 hour GTT ordered     Orders:    Glucose JERED 3HR 100GM PREG PTS; Future      RTO 2 weeks f/u plans for contraception signed MA 31 if desires permanent female sterilization  History of Present Illness   HPI  Jacquie Gardner is a 29 y.o. female who presents for PN visit      Denies loss of fluid, vaginal bleeding and abdominal pain.  Confirms frequent fetal movement.  Tolerating prenatal vitamin well.  28-week labs reviewed significant for elevated 1 hour glucose.      patient plans include repeat  section, breast-feeding, uncertain regarding pediatrician and postpartum contraception.  Patient had been interested in permanent female sterilization.  Uncertain at this time.  Will review at next visit    History obtained from: patient    Review of Systems   Constitutional:  Negative for chills and fever.   Respiratory: Negative.     Cardiovascular: Negative.      Medical History Reviewed by  provider this encounter:  Allergies  Meds  Problems     .     Objective   /65   Wt 80.2 kg (176 lb 12.8 oz)   LMP 07/27/2024 (Exact Date)   BMI 32.34 kg/m²      Physical Exam  Constitutional:       Appearance: Normal appearance.   Neurological:      Mental Status: She is alert and oriented to person, place, and time.   Psychiatric:         Mood and Affect: Mood normal.         Behavior: Behavior normal.

## 2025-02-03 NOTE — ASSESSMENT & PLAN NOTE
Planning repeat  section  Message sent to nurse navigator for scheduling.  Patient prefers 25

## 2025-02-03 NOTE — ASSESSMENT & PLAN NOTE
Continue inhaler as needed.  Encouraged to call office if using more than 3 times per week

## 2025-02-04 ENCOUNTER — ROUTINE PRENATAL (OUTPATIENT)
Dept: OBGYN CLINIC | Facility: CLINIC | Age: 30
End: 2025-02-04
Payer: MEDICARE

## 2025-02-04 ENCOUNTER — RESULTS FOLLOW-UP (OUTPATIENT)
Dept: OBGYN CLINIC | Facility: CLINIC | Age: 30
End: 2025-02-04

## 2025-02-04 VITALS — BODY MASS INDEX: 32.34 KG/M2 | SYSTOLIC BLOOD PRESSURE: 100 MMHG | DIASTOLIC BLOOD PRESSURE: 65 MMHG | WEIGHT: 176.8 LBS

## 2025-02-04 DIAGNOSIS — J45.909 ASTHMA AFFECTING PREGNANCY IN SECOND TRIMESTER: ICD-10-CM

## 2025-02-04 DIAGNOSIS — Z3A.27 27 WEEKS GESTATION OF PREGNANCY: ICD-10-CM

## 2025-02-04 DIAGNOSIS — O99.810 ABNORMAL GLUCOSE TOLERANCE TEST (GTT) DURING PREGNANCY, ANTEPARTUM: ICD-10-CM

## 2025-02-04 DIAGNOSIS — O99.512 ASTHMA AFFECTING PREGNANCY IN SECOND TRIMESTER: ICD-10-CM

## 2025-02-04 DIAGNOSIS — O34.219 PREVIOUS CESAREAN SECTION COMPLICATING PREGNANCY: Primary | ICD-10-CM

## 2025-02-04 LAB — TREPONEMA PALLIDUM IGG+IGM AB [PRESENCE] IN SERUM OR PLASMA BY IMMUNOASSAY: NORMAL

## 2025-02-04 PROCEDURE — 99213 OFFICE O/P EST LOW 20 MIN: CPT | Performed by: NURSE PRACTITIONER

## 2025-02-06 ENCOUNTER — PATIENT MESSAGE (OUTPATIENT)
Dept: OBGYN CLINIC | Facility: MEDICAL CENTER | Age: 30
End: 2025-02-06

## 2025-02-06 ENCOUNTER — TELEPHONE (OUTPATIENT)
Dept: OBGYN CLINIC | Facility: MEDICAL CENTER | Age: 30
End: 2025-02-06

## 2025-02-06 NOTE — TELEPHONE ENCOUNTER
3RD TRIMESTER CHECK-IN CALL      Overall how are you feeling? Patient reports to be doing well overall.      Compliant with routine OB appointments? Yes.     Have you completed your 3rd trimester lab work? Yes. 1 hour GTT elevated. Order for 3 hour GTT already in chart. Instructions reviewed.      Have you reviewed the contents of 3rd trimester folder from office? Yes.                Have you decided on a pediatrician? [facility]                            Questions on paperwork to go back to office? No. Will bring completed forms to her next OB visit.   Questions on the baby birth certificate forms? Not at this time.

## 2025-02-06 NOTE — TELEPHONE ENCOUNTER
Discussed C/S timing with provider. RLTCS scheduled  at 0900 with Dr. Ross. Patient aware and agreeable with date and time. No further questions at this time.    ----- Message from TERE Benitez sent at 2025 11:20 AM EST -----  Procedure to be scheduled (IOL or CS): repeat      JESE: Estimated Date of Delivery: 5/3/25     Indication for delivery: Elective at term    Requested date (s) of delivery: 25   If requested date is unavailable, is there a date by which the pt must be delivered?    Physician preference: AA     Cervical Exam Not evaluated.    If IOL, anticipated method: NA    AM or PM IOL? NA    If CS, with or without tubal: without     TY

## 2025-02-06 NOTE — TELEPHONE ENCOUNTER
Patient advises she forgot to ask a question- was told her c/s could be either 04/28 or 04/29. Wants to know if she can do 04/29 instead if possible. If her doctor is not available on this date, then she will keep the 28th.

## 2025-02-11 ENCOUNTER — RESULTS FOLLOW-UP (OUTPATIENT)
Dept: OBGYN CLINIC | Facility: CLINIC | Age: 30
End: 2025-02-11

## 2025-02-11 ENCOUNTER — APPOINTMENT (OUTPATIENT)
Dept: LAB | Facility: HOSPITAL | Age: 30
End: 2025-02-11
Payer: MEDICARE

## 2025-02-11 DIAGNOSIS — O99.810 ABNORMAL GLUCOSE TOLERANCE TEST (GTT) DURING PREGNANCY, ANTEPARTUM: ICD-10-CM

## 2025-02-11 DIAGNOSIS — Z3A.27 27 WEEKS GESTATION OF PREGNANCY: ICD-10-CM

## 2025-02-11 LAB
GLUCOSE 1H P 100 G GLC PO SERPL-MCNC: 122 MG/DL (ref 65–179)
GLUCOSE 2H P 100 G GLC PO SERPL-MCNC: 123 MG/DL (ref 65–154)
GLUCOSE 3H P 100 G GLC PO SERPL-MCNC: 90 MG/DL (ref 65–139)
GLUCOSE P FAST SERPL-MCNC: 67 MG/DL (ref 65–94)

## 2025-02-11 PROCEDURE — 36415 COLL VENOUS BLD VENIPUNCTURE: CPT

## 2025-02-11 PROCEDURE — 82952 GTT-ADDED SAMPLES: CPT

## 2025-02-11 PROCEDURE — 82951 GLUCOSE TOLERANCE TEST (GTT): CPT

## 2025-02-17 PROBLEM — Z3A.29 29 WEEKS GESTATION OF PREGNANCY: Status: ACTIVE | Noted: 2024-11-26

## 2025-02-17 PROBLEM — O99.513 ASTHMA AFFECTING PREGNANCY IN THIRD TRIMESTER: Status: ACTIVE | Noted: 2024-10-25

## 2025-02-17 NOTE — PROGRESS NOTES
Name: Jacquie Gardner      : 1995      MRN: [id number]  Encounter Provider: TERE Mccormick  Encounter Date: 2025   Encounter department: [facility]  :  Assessment & Plan  29 weeks gestation of pregnancy  Continue prenatal vitamin daily  Continue fetal kick counts daily  Tdap vaccine today  Breast pump ordered today  Common discomforts of pregnancy and precautions including  labor reviewed.  Signs and symptoms to report reviewed.           Abnormal glucose tolerance test (GTT) during pregnancy, antepartum  1 hour ; 3 hour GTT -WNL           Previous  section complicating pregnancy  Planning repeat  section and permanent female sterilization at term  Repeat  section scheduled for 25.  MA 31 signed 25.  Written information provided.  Patient verbalized understanding is a permanent/irreversible procedure with a high risk of regret.               RTO 2 weeks   History of Present Illness   HPI  Jacquie Gardner is a 29 y.o. female who presents for PN visit      Denies loss of fluid vaginal bleeding and abdominal pain.  Confirms frequent fetal movement.  Doing fetal kick counts.  Tolerating prenatal vitamin and probiotics well.  Has not needed rescue inhaler.  3-hour GTT reviewed-WNL.  Reviewed recommendation for Tdap vaccine.  Patient desires vaccine today.  Is interested in permanent female sterilization.    History obtained from: patient    Review of Systems   Constitutional:  Negative for chills and fever.   Respiratory: Negative.     Cardiovascular: Negative.    Medical History Reviewed by provider this encounter:  Allergies  Meds     .     Objective   LMP 2024 (Exact Date)      Physical Exam  Constitutional:       Appearance: Normal appearance.   Neurological:      Mental Status: She is alert.   Psychiatric:         Mood and Affect: Mood normal.         Behavior: Behavior normal.

## 2025-02-17 NOTE — ASSESSMENT & PLAN NOTE
Planning repeat  section and permanent female sterilization at term  Repeat  section scheduled for 25.  MA 31 signed 25.  Written information provided.  Patient verbalized understanding is a permanent/irreversible procedure with a high risk of regret.

## 2025-02-17 NOTE — ASSESSMENT & PLAN NOTE
Continue prenatal vitamin daily  Continue fetal kick counts daily  Tdap vaccine today  Breast pump ordered today  Common discomforts of pregnancy and precautions including  labor reviewed.  Signs and symptoms to report reviewed.

## 2025-02-18 ENCOUNTER — ROUTINE PRENATAL (OUTPATIENT)
Dept: OBGYN CLINIC | Facility: CLINIC | Age: 30
End: 2025-02-18
Payer: MEDICARE

## 2025-02-18 VITALS — BODY MASS INDEX: 32.19 KG/M2 | WEIGHT: 176 LBS | SYSTOLIC BLOOD PRESSURE: 104 MMHG | DIASTOLIC BLOOD PRESSURE: 62 MMHG

## 2025-02-18 DIAGNOSIS — Z3A.29 29 WEEKS GESTATION OF PREGNANCY: ICD-10-CM

## 2025-02-18 DIAGNOSIS — O34.219 PREVIOUS CESAREAN SECTION COMPLICATING PREGNANCY: Primary | ICD-10-CM

## 2025-02-18 DIAGNOSIS — O99.810 ABNORMAL GLUCOSE TOLERANCE TEST (GTT) DURING PREGNANCY, ANTEPARTUM: ICD-10-CM

## 2025-02-18 DIAGNOSIS — Z30.09 STERILIZATION EDUCATION: ICD-10-CM

## 2025-02-18 DIAGNOSIS — Z23 ENCOUNTER FOR IMMUNIZATION: ICD-10-CM

## 2025-02-18 PROCEDURE — 90715 TDAP VACCINE 7 YRS/> IM: CPT | Performed by: NURSE PRACTITIONER

## 2025-02-18 PROCEDURE — 99213 OFFICE O/P EST LOW 20 MIN: CPT | Performed by: NURSE PRACTITIONER

## 2025-02-18 PROCEDURE — 90471 IMMUNIZATION ADMIN: CPT | Performed by: NURSE PRACTITIONER

## 2025-02-22 LAB
DME PARACHUTE DELIVERY DATE ACTUAL: NORMAL
DME PARACHUTE DELIVERY DATE REQUESTED: NORMAL
DME PARACHUTE ITEM DESCRIPTION: NORMAL
DME PARACHUTE ORDER STATUS: NORMAL
DME PARACHUTE SUPPLIER NAME: NORMAL
DME PARACHUTE SUPPLIER PHONE: NORMAL

## 2025-03-03 ENCOUNTER — ROUTINE PRENATAL (OUTPATIENT)
Dept: OBGYN CLINIC | Facility: MEDICAL CENTER | Age: 30
End: 2025-03-03

## 2025-03-03 ENCOUNTER — TELEPHONE (OUTPATIENT)
Dept: OBGYN CLINIC | Facility: MEDICAL CENTER | Age: 30
End: 2025-03-03

## 2025-03-03 VITALS — BODY MASS INDEX: 32.37 KG/M2 | SYSTOLIC BLOOD PRESSURE: 118 MMHG | WEIGHT: 177 LBS | DIASTOLIC BLOOD PRESSURE: 68 MMHG

## 2025-03-03 DIAGNOSIS — O99.810 ABNORMAL GLUCOSE TOLERANCE TEST (GTT) DURING PREGNANCY, ANTEPARTUM: ICD-10-CM

## 2025-03-03 DIAGNOSIS — Z30.2 REQUEST FOR STERILIZATION: ICD-10-CM

## 2025-03-03 DIAGNOSIS — Z3A.31 31 WEEKS GESTATION OF PREGNANCY: Primary | ICD-10-CM

## 2025-03-03 DIAGNOSIS — O44.00 PLACENTA PREVIA ANTEPARTUM: ICD-10-CM

## 2025-03-03 DIAGNOSIS — O34.219 PREVIOUS CESAREAN SECTION COMPLICATING PREGNANCY: ICD-10-CM

## 2025-03-03 PROCEDURE — PNV: Performed by: OBSTETRICS & GYNECOLOGY

## 2025-03-03 NOTE — TELEPHONE ENCOUNTER
C/S timing on 4/28 changed from 9 AM to 8 AM after discussing with provider. Patient has an appointment this afternoon and will be notified. Neurodynt message sent as well regarding time change.

## 2025-03-03 NOTE — PROGRESS NOTES
Name: Jacquie Gardner      : 1995      MRN: [id number]  Encounter Provider: Shelly Cage MD  Encounter Date: 3/3/2025   Encounter [facility]: [facility]  :  Assessment & Plan  Placenta previa antepartum       resolved as of  last US   Previous  section complicating pregnancy       has repeat  C/S and tubal  scheduled  with Dr Ross    Request for sterilization       Ma 31 discussed and signed on    Procedure reviewed again  today    Aware permament and  irreversible  alternatives also  discussed    31 weeks gestation of pregnancy       FCK And  PTL precautions reviewed    Brought birth plan which was reviewed   Declines  meds and vaccines to baby   This is a baby  boy and  declines circumcision and  vitamin  K   Aware of  increased bleeding risk to baby if  there were  a need for  surgical intervention etc and  no vitamin K given     Abnormal glucose tolerance test (GTT) during pregnancy, antepartum       normal 3 hr gtt       History of Present Illness   HPI  Jacquie Gardner is a 29 y.o. female who presents for routine prenatal care . No lof or vb , no contractions   +FM      Review of Systems   All other systems reviewed and are negative.       Objective   /68   Wt 80.3 kg (177 lb)   LMP 2024 (Exact Date)   BMI 32.37 kg/m²      Physical Exam  Constitutional:       Appearance: Normal appearance.   HENT:      Head: Normocephalic.      Nose: Nose normal.   Eyes:      Conjunctiva/sclera: Conjunctivae normal.   Pulmonary:      Effort: Pulmonary effort is normal.   Abdominal:      Comments: Gravid  non  tender     Neurological:      Mental Status: She is alert.

## 2025-03-03 NOTE — ASSESSMENT & PLAN NOTE
Amada Ocasio discussed and signed on  2/18  Procedure reviewed again  today    Aware permament and  irreversible  alternatives also  discussed

## 2025-03-03 NOTE — TELEPHONE ENCOUNTER
3RD TRIMESTER CHECK-IN     Overall how are you feeling? Patient reports to be doing well. RLTCS planned for 4/28. Tubal added to procedure. MA 31 signed 2/18.      Compliant with routine OB appointments? Yes.     Have you completed your 3rd trimester lab work? Yes. 1 hour GTT elevated. 3 hour GTT WNL.      Have you reviewed the contents of 3rd trimester folder from office? Yes.                Have you decided on a pediatrician? [facility]                            Questions on paperwork to go back to office? Returned.  Questions on the baby birth certificate forms? Not at this time.

## 2025-03-05 NOTE — ASSESSMENT & PLAN NOTE
For repeat c section   We discussed steriliation   
NIPT negative  AFP not collected yet advised it needs to be done prior to 22 weeks so do asap     Growth 1/16  
Bill For Evaluation (61632)?: Yes
Follow Up Plan: Per the request of Dr. Adams, patient was seen today for a 7 week follow up for Superficial Radiation Therapy. Physician evaluation of the ultrasound tumor depth, width, and breadth was ongoing through course of treatment and is deemed medically necessary ensuring efficacy of treatment. All appropriate blocking and treatment parameters verified by radiation therapist according to initial simulation. A high frequency ultrasound image was acquired today for two-dimensional evaluation of treatment volume and response to treatment, in addition, geometric accuracy of field placement. Today’s image was evaluated, lesion not detected on US. \\nObjectively, the treated radiation area was evaluated with regards to erythema, atrophy, scale, crusting, erosion, ulceration, edema, purpura, tenderness, warmth, drainage, and any other finding. Patient to follow up for routine visits.
Ultrasound Not Used Text: Ultrasound was not performed today due to
Evaluation Plan: The patient is undergoing superficial radiation therapy for skin cancer and presents for weekly evaluation and management. Per protocol and as documented on the flow sheet, the patient was questioned as to subjective redness, pruritus, pain, drainage, fatigue, or any other symptoms. Objectively, the radiation area was evaluated with regards to erythema, atrophy, scale, crusting, erosion, ulceration, edema, purpura, tenderness, warmth, drainage, and any other findings. The plan was extensively reviewed including dose and dosing schedule. The simulation and clinical setup were also reviewed as were external and any internal shields and based on this review the appropriateness and sufficiency of treatment was determined.
in ASU:
Assessment: Appropriate reaction
Ultrasound Used Text: Ultrasound depth is 1.36 mm.
Is This Visit For Evaluation During Treatment Or Follow Up Post Treatment?: follow up
Radiation Therapy Oncology Group (Rtog) Score: 0
Additional Comments (Add Customization Of Note Here): Patient here today for Follow-up. Skin presents like a slight discoloration. Repopulation present on ultrasound would deem this patients cancer cured. Follow-up with dermatology for 6 month skin checks.

## 2025-03-19 PROBLEM — Z3A.33 33 WEEKS GESTATION OF PREGNANCY: Status: ACTIVE | Noted: 2024-11-26

## 2025-03-20 ENCOUNTER — ROUTINE PRENATAL (OUTPATIENT)
Dept: OBGYN CLINIC | Facility: MEDICAL CENTER | Age: 30
End: 2025-03-20
Payer: MEDICARE

## 2025-03-20 VITALS — WEIGHT: 180.3 LBS | SYSTOLIC BLOOD PRESSURE: 104 MMHG | BODY MASS INDEX: 32.98 KG/M2 | DIASTOLIC BLOOD PRESSURE: 56 MMHG

## 2025-03-20 DIAGNOSIS — O34.219 PREVIOUS CESAREAN SECTION COMPLICATING PREGNANCY: Primary | ICD-10-CM

## 2025-03-20 DIAGNOSIS — Z3A.33 33 WEEKS GESTATION OF PREGNANCY: ICD-10-CM

## 2025-03-20 DIAGNOSIS — Z30.2 REQUEST FOR STERILIZATION: ICD-10-CM

## 2025-03-20 DIAGNOSIS — Z34.93 THIRD TRIMESTER PREGNANCY: ICD-10-CM

## 2025-03-20 DIAGNOSIS — O44.00 PLACENTA PREVIA ANTEPARTUM: ICD-10-CM

## 2025-03-20 DIAGNOSIS — O99.810 ABNORMAL GLUCOSE TOLERANCE TEST (GTT) DURING PREGNANCY, ANTEPARTUM: ICD-10-CM

## 2025-03-20 PROCEDURE — 99213 OFFICE O/P EST LOW 20 MIN: CPT | Performed by: ADVANCED PRACTICE MIDWIFE

## 2025-03-20 NOTE — ASSESSMENT & PLAN NOTE
Reviewed signs and symptoms PTL, FKC  Plans on breast-feeding  Scheduled for repeat  with tubal  Next visit 2 weeks

## 2025-03-20 NOTE — PROGRESS NOTES
Name: Jacquie Gardner      : 1995      MRN: [id number]  Encounter Provider: Lissy Rivers CNM  Encounter Date: 3/20/2025   Encounter [facility]: [facility]  :  Assessment & Plan  Previous  section complicating pregnancy       has repeat  C/S and tubal  scheduled  with Dr Ross    Placenta previa antepartum       resolved as of  last US   Request for sterilization  Well counseled on procedure and that procedure is permanent  MA 31 was signed        Abnormal glucose tolerance test (GTT) during pregnancy, antepartum       normal 3 hr gtt   Third trimester pregnancy         33 weeks gestation of pregnancy  Reviewed signs and symptoms PTL, FKC  Plans on breast-feeding  Scheduled for repeat  with tubal  Next visit 2 weeks           History of Present Illness   Jacquie Gardner is a 29 y.o.  female who presents for routine prenatal care at 33w5d.  Pregnancy ROS: no leakage of fluid, pelvic pain, or vaginal bleeding.  Good fetal movement.    Objective:  /56   Wt 81.8 kg (180 lb 4.8 oz)   LMP 2024 (Exact Date)   BMI 32.98 kg/m²   Pregravid Weight/BMI: 65.8 kg (145 lb) (BMI 26.51)  Current Weight: 81.8 kg (180 lb 4.8 oz)   Total Weight Gain: 16 kg (35 lb 4.8 oz)   Pre- Vitals    Flowsheet Row Most Recent Value   Prenatal Assessment    Fetal Heart Rate 132   Movement Present   Prenatal Vitals    Blood Pressure 104/56   Weight - Scale 81.8 kg (180 lb 4.8 oz)   Urine Albumin/Glucose    Dilation/Effacement/Station    Vaginal Drainage    Edema    LLE Edema Trace   RLE Edema Trace          Jacquie Gardner is a 29 y.o. female who presents for prenatal visit  History obtained from: patient    Review of Systems   Constitutional:  Negative for chills and fever.   Respiratory:  Negative for cough and shortness of breath.    Cardiovascular:  Negative for chest pain and palpitations.   Genitourinary:  Negative for difficulty urinating  and vaginal bleeding.   Psychiatric/Behavioral:  The patient is not nervous/anxious.      Medical History Reviewed by provider this encounter:     .  Current Outpatient Medications on File Prior to Visit   Medication Sig Dispense Refill   • albuterol (PROVENTIL HFA,VENTOLIN HFA) 90 mcg/act inhaler Inhale 2 puffs every 6 (six) hours as needed for wheezing or shortness of breath 18 g 2   • Prenatal MV-Min-Fe Fum-FA-DHA (PRENATAL 1 PO) Take by mouth     • Probiotic Product (UP4 PROBIOTICS PO) Take by mouth       No current facility-administered medications on file prior to visit.         Objective   /56   Wt 81.8 kg (180 lb 4.8 oz)   LMP 07/27/2024 (Exact Date)   BMI 32.98 kg/m²      Physical Exam  Vitals reviewed.   Constitutional:       Appearance: Normal appearance.   Pulmonary:      Effort: Pulmonary effort is normal.   Abdominal:      Comments: Gravid uterus   Neurological:      Mental Status: She is alert and oriented to person, place, and time.   Psychiatric:         Mood and Affect: Mood normal.         Behavior: Behavior normal.

## 2025-04-01 PROBLEM — Z30.2 REQUEST FOR STERILIZATION: Status: RESOLVED | Noted: 2025-03-03 | Resolved: 2025-04-01

## 2025-04-01 PROBLEM — Z3A.35 35 WEEKS GESTATION OF PREGNANCY: Status: ACTIVE | Noted: 2024-11-26

## 2025-04-01 NOTE — ASSESSMENT & PLAN NOTE
Continue prenatal vitamin daily  Continue fetal kick counts daily  GBS collected today- no PCN allergy  Common discomforts of pregnancy and precautions including  labor reviewed.  Signs and symptoms to report reviewed.      Orders:    Strep B DNA probe, amplification

## 2025-04-01 NOTE — ASSESSMENT & PLAN NOTE
Planning repeat  section and permanent female sterilization at term  Repeat  section scheduled for 25.  Continues to desire permanent female sterilization.  ZEINAB Ocasio signed 25.  Written information provided.  Patient verbalized understanding is a permanent/irreversible procedure with a high risk of regret.    Orders:    Strep B DNA probe, amplification

## 2025-04-01 NOTE — PROGRESS NOTES
Name: Jacquie Gardner      : 1995      MRN: [id number]  Encounter Provider: TERE Mccormick  Encounter Date: 4/3/2025   Encounter department: [facility]  :  Assessment & Plan  35 weeks gestation of pregnancy  Continue prenatal vitamin daily  Continue fetal kick counts daily  GBS collected today- no PCN allergy  Common discomforts of pregnancy and precautions including  labor reviewed.  Signs and symptoms to report reviewed.      Orders:    Strep B DNA probe, amplification      Previous  section complicating pregnancy  Planning repeat  section and permanent female sterilization at term  Repeat  section scheduled for 25.  Continues to desire permanent female sterilization.  MA 31 signed 25.  Written information provided.  Patient verbalized understanding is a permanent/irreversible procedure with a high risk of regret.    Orders:    Strep B DNA probe, amplification          Abnormal glucose tolerance test (GTT) during pregnancy, antepartum  1 hour ; 3 hour GTT -WNL             RTO 1 week f/u GBS     History of Present Illness   HPI  Jacquie Gardner is a 29 y.o. female who presents for PNV      Denies loss of fluid, vaginal bleeding and abdominal pain.  Confirms frequent fetal movement.  Doing fetal kick counts.  Tolerating prenatal vitamin well. Has not needed rescue inhaler.      History obtained from: patient    Review of Systems   Constitutional:  Negative for chills and fever.   Respiratory: Negative.     Cardiovascular: Negative.    Genitourinary: Negative.      Medical History Reviewed by provider this encounter:  Allergies  Meds     .     Objective   LMP 2024 (Exact Date)      Physical Exam  Constitutional:       Appearance: Normal appearance.   Neurological:      Mental Status: She is alert and oriented to person, place, and time.   Psychiatric:         Mood and Affect: Mood normal.          Behavior: Behavior normal.

## 2025-04-03 ENCOUNTER — ROUTINE PRENATAL (OUTPATIENT)
Dept: OBGYN CLINIC | Facility: MEDICAL CENTER | Age: 30
End: 2025-04-03
Payer: MEDICARE

## 2025-04-03 VITALS — BODY MASS INDEX: 33.36 KG/M2 | SYSTOLIC BLOOD PRESSURE: 100 MMHG | DIASTOLIC BLOOD PRESSURE: 64 MMHG | WEIGHT: 182.4 LBS

## 2025-04-03 DIAGNOSIS — O99.810 ABNORMAL GLUCOSE TOLERANCE TEST (GTT) DURING PREGNANCY, ANTEPARTUM: ICD-10-CM

## 2025-04-03 DIAGNOSIS — O34.219 PREVIOUS CESAREAN SECTION COMPLICATING PREGNANCY: Primary | ICD-10-CM

## 2025-04-03 DIAGNOSIS — Z3A.35 35 WEEKS GESTATION OF PREGNANCY: ICD-10-CM

## 2025-04-03 PROCEDURE — 87150 DNA/RNA AMPLIFIED PROBE: CPT | Performed by: NURSE PRACTITIONER

## 2025-04-03 PROCEDURE — 99213 OFFICE O/P EST LOW 20 MIN: CPT | Performed by: NURSE PRACTITIONER

## 2025-04-07 ENCOUNTER — RESULTS FOLLOW-UP (OUTPATIENT)
Dept: OBGYN CLINIC | Facility: CLINIC | Age: 30
End: 2025-04-07

## 2025-04-07 LAB — GP B STREP DNA SPEC QL NAA+PROBE: NEGATIVE

## 2025-04-08 NOTE — ASSESSMENT & PLAN NOTE
Planning repeat  section and permanent female sterilization at term  Repeat  section scheduled for 25.  Patient reaffirms desire for permanent female sterilization.  ZEINAB Ocasio signed 25.  Written information provided.  Patient verbalized understanding is a permanent/irreversible procedure with a high risk of regret.  Has preoperative showering instructions and soap

## 2025-04-08 NOTE — ASSESSMENT & PLAN NOTE
Continue prenatal vitamin daily  Continue fetal kick counts daily  GBS negative  Belly band encouraged.  Slow transitions encouraged.  Offered physical therapy, patient declines at this time.  Reviewed with patient pressure during pregnancy versus  symphysis pubis.  If no improvement postpartum recommend physical therapy  Common discomforts of pregnancy and precautions including  labor reviewed.  Signs and symptoms to report reviewed.

## 2025-04-08 NOTE — PROGRESS NOTES
Name: Jacquie Gardner      : 1995      MRN: [id number]  Encounter Provider: TERE Mccormick  Encounter Date: 2025   Encounter department: [facility]  :  Assessment & Plan  35 weeks gestation of pregnancy  Continue prenatal vitamin daily  Continue fetal kick counts daily  GBS negative  Belly band encouraged.  Slow transitions encouraged.  Offered physical therapy, patient declines at this time.  Reviewed with patient pressure during pregnancy versus  symphysis pubis.  If no improvement postpartum recommend physical therapy  Common discomforts of pregnancy and precautions including  labor reviewed.  Signs and symptoms to report reviewed.               Previous  section complicating pregnancy  Planning repeat  section and permanent female sterilization at term  Repeat  section scheduled for 25.  Patient reaffirms desire for permanent female sterilization.  ZEINAB Ocasio signed 25.  Written information provided.  Patient verbalized understanding is a permanent/irreversible procedure with a high risk of regret.  Has preoperative showering instructions and soap                 Asthma affecting pregnancy in third trimester  Continue inhaler as needed.  Encouraged to call office if using more than 3 times per week             Abnormal glucose tolerance test (GTT) during pregnancy, antepartum  1 hour ; 3 hour GTT -WNL               RTO 1 week    History of Present Illness   HPI  Jacquie Gardner is a 29 y.o. female who presents PNV     Denies loss of fluid, vaginal bleeding and abdominal pain.  Confirms frequent fetal movement.  Tolerating prenatal vitamin well.  Has been having pressure in her pubic bone, slipping and pain.  Pain is worse with movement or having to urinate.    History obtained from: patient    Review of Systems   Constitutional:  Negative for chills and fever.   Respiratory: Negative.      Cardiovascular: Negative.    Genitourinary: Negative.      Medical History Reviewed by provider this encounter:  Allergies  Meds  Problems     .     Objective   LMP 07/27/2024 (Exact Date)      Physical Exam  Constitutional:       Appearance: Normal appearance.   Neurological:      Mental Status: She is alert and oriented to person, place, and time.   Psychiatric:         Mood and Affect: Mood normal.         Behavior: Behavior normal.

## 2025-04-08 NOTE — ASSESSMENT & PLAN NOTE
Continue inhaler as needed.  Encouraged to call office if using more than 3 times per week

## 2025-04-11 ENCOUNTER — ROUTINE PRENATAL (OUTPATIENT)
Dept: OBGYN CLINIC | Facility: MEDICAL CENTER | Age: 30
End: 2025-04-11
Payer: MEDICARE

## 2025-04-11 VITALS — DIASTOLIC BLOOD PRESSURE: 78 MMHG | BODY MASS INDEX: 33.95 KG/M2 | SYSTOLIC BLOOD PRESSURE: 110 MMHG | WEIGHT: 185.6 LBS

## 2025-04-11 DIAGNOSIS — J45.909 ASTHMA AFFECTING PREGNANCY IN THIRD TRIMESTER: ICD-10-CM

## 2025-04-11 DIAGNOSIS — O99.810 ABNORMAL GLUCOSE TOLERANCE TEST (GTT) DURING PREGNANCY, ANTEPARTUM: ICD-10-CM

## 2025-04-11 DIAGNOSIS — O34.219 PREVIOUS CESAREAN SECTION COMPLICATING PREGNANCY: Primary | ICD-10-CM

## 2025-04-11 DIAGNOSIS — O99.513 ASTHMA AFFECTING PREGNANCY IN THIRD TRIMESTER: ICD-10-CM

## 2025-04-11 DIAGNOSIS — Z3A.36 36 WEEKS GESTATION OF PREGNANCY: ICD-10-CM

## 2025-04-11 PROCEDURE — 99213 OFFICE O/P EST LOW 20 MIN: CPT | Performed by: NURSE PRACTITIONER

## 2025-04-14 PROBLEM — O44.00 PLACENTA PREVIA ANTEPARTUM: Status: RESOLVED | Noted: 2025-01-16 | Resolved: 2025-04-14

## 2025-04-15 ENCOUNTER — ROUTINE PRENATAL (OUTPATIENT)
Dept: OBGYN CLINIC | Facility: MEDICAL CENTER | Age: 30
End: 2025-04-15
Payer: MEDICARE

## 2025-04-15 VITALS — WEIGHT: 183 LBS | BODY MASS INDEX: 33.47 KG/M2 | DIASTOLIC BLOOD PRESSURE: 60 MMHG | SYSTOLIC BLOOD PRESSURE: 118 MMHG

## 2025-04-15 DIAGNOSIS — O34.219 PREVIOUS CESAREAN SECTION COMPLICATING PREGNANCY: Primary | ICD-10-CM

## 2025-04-15 DIAGNOSIS — Z3A.36 36 WEEKS GESTATION OF PREGNANCY: ICD-10-CM

## 2025-04-15 DIAGNOSIS — O99.810 ABNORMAL GLUCOSE TOLERANCE TEST (GTT) DURING PREGNANCY, ANTEPARTUM: ICD-10-CM

## 2025-04-15 DIAGNOSIS — Z30.09 STERILIZATION EDUCATION: ICD-10-CM

## 2025-04-15 PROCEDURE — 99213 OFFICE O/P EST LOW 20 MIN: CPT | Performed by: OBSTETRICS & GYNECOLOGY

## 2025-04-15 NOTE — PROGRESS NOTES
Routine Prenatal Visit  [facility] [address], [place], [place]      OB/GYN Prenatal Visit    ASSESSMENT / PLAN:  1. Previous  section complicating pregnancy  Assessment & Plan:  Planning repeat  section and permanent female sterilization at term  Repeat  section scheduled for 25.  Patient reaffirms desire for permanent female sterilization.  ZEINAB Ocasio signed 25.  Written information provided.  Patient verbalized understanding is a permanent/irreversible procedure with a high risk of regret.  Has preoperative showering instructions and soap                Orders:  -     CBC and differential; Future  -     Type and screen; Future  -     RPR-Syphilis Screening (Total Syphilis IGG/IGM); Future  2. Sterilization education  Assessment & Plan:  ZEINAB Ocasio signed 25  3. Abnormal glucose tolerance test (GTT) during pregnancy, antepartum  Assessment & Plan:  1 hour ; 3 hour GTT -WNL              4. 36 weeks gestation of pregnancy  Assessment & Plan:  NIPT negative        SUBJECTIVE:  Jacquie Gardner is a 29 y.o.  at 39 here for prenatal visit.  She has no obstetric complaints and denies pelvic pain, cramping/contractions, vaginal bleeding, loss of fluid, or decreased fetal movement.       The following portions of the patient's history were reviewed and updated as appropriate: allergies, current medications, past family history, past medical history, obstetric history, gynecologic history, past social history, past surgical history and problem list.      Objective:  /60   Wt 83 kg (183 lb)   LMP 2024 (Exact Date)   BMI 33.47 kg/m²   Pregravid Weight/BMI: 65.8 kg (145 lb) (BMI 26.51)  Current Weight: 83 kg (183 lb)   Total Weight Gain: 17.2 kg (38 lb)   Pre-Mechelle Vitals      Flowsheet Row Most Recent Value   Prenatal Assessment    Fetal Heart Rate 147   Movement Present   Prenatal Vitals    Blood Pressure 118/60   Weight -  Scale 83 kg (183 lb)   Urine Albumin/Glucose    Dilation/Effacement/Station    Vaginal Drainage    Draining Fluid No   Edema    LLE Edema None   RLE Edema None   Facial Edema None               Physical Exam:    General: Well appearing, no distress  Respiratory: Unlabored breathing  Cardiovascular: Regular rate.  Abdomen: Soft, gravid, nontender  Fundal Height: Appropriate for gestational age.  Extremities: Warm and well perfused.  Non tender.      Obed Ross MD

## 2025-04-23 ENCOUNTER — APPOINTMENT (OUTPATIENT)
Dept: LAB | Facility: HOSPITAL | Age: 30
End: 2025-04-23
Payer: MEDICARE

## 2025-04-23 DIAGNOSIS — O34.219 PREVIOUS CESAREAN SECTION COMPLICATING PREGNANCY: ICD-10-CM

## 2025-04-23 LAB
ABO GROUP BLD: NORMAL
BASOPHILS # BLD AUTO: 0.04 THOUSANDS/ÂΜL (ref 0–0.1)
BASOPHILS NFR BLD AUTO: 1 % (ref 0–1)
BLD GP AB SCN SERPL QL: NEGATIVE
EOSINOPHIL # BLD AUTO: 0.13 THOUSAND/ÂΜL (ref 0–0.61)
EOSINOPHIL NFR BLD AUTO: 2 % (ref 0–6)
ERYTHROCYTE [DISTWIDTH] IN BLOOD BY AUTOMATED COUNT: 13.5 % (ref 11.6–15.1)
HCT VFR BLD AUTO: 37.3 % (ref 34.8–46.1)
HGB BLD-MCNC: 12.4 G/DL (ref 11.5–15.4)
IMM GRANULOCYTES # BLD AUTO: 0.05 THOUSAND/UL (ref 0–0.2)
IMM GRANULOCYTES NFR BLD AUTO: 1 % (ref 0–2)
LYMPHOCYTES # BLD AUTO: 1.95 THOUSANDS/ÂΜL (ref 0.6–4.47)
LYMPHOCYTES NFR BLD AUTO: 26 % (ref 14–44)
MCH RBC QN AUTO: 28.3 PG (ref 26.8–34.3)
MCHC RBC AUTO-ENTMCNC: 33.2 G/DL (ref 31.4–37.4)
MCV RBC AUTO: 85 FL (ref 82–98)
MONOCYTES # BLD AUTO: 0.64 THOUSAND/ÂΜL (ref 0.17–1.22)
MONOCYTES NFR BLD AUTO: 9 % (ref 4–12)
NEUTROPHILS # BLD AUTO: 4.71 THOUSANDS/ÂΜL (ref 1.85–7.62)
NEUTS SEG NFR BLD AUTO: 61 % (ref 43–75)
NRBC BLD AUTO-RTO: 0 /100 WBCS
PLATELET # BLD AUTO: 288 THOUSANDS/UL (ref 149–390)
PMV BLD AUTO: 9.4 FL (ref 8.9–12.7)
RBC # BLD AUTO: 4.38 MILLION/UL (ref 3.81–5.12)
RH BLD: POSITIVE
SPECIMEN EXPIRATION DATE: NORMAL
WBC # BLD AUTO: 7.52 THOUSAND/UL (ref 4.31–10.16)

## 2025-04-23 PROCEDURE — 86900 BLOOD TYPING SEROLOGIC ABO: CPT

## 2025-04-23 PROCEDURE — 86901 BLOOD TYPING SEROLOGIC RH(D): CPT

## 2025-04-23 PROCEDURE — 85025 COMPLETE CBC W/AUTO DIFF WBC: CPT

## 2025-04-23 PROCEDURE — 86850 RBC ANTIBODY SCREEN: CPT

## 2025-04-23 PROCEDURE — 86780 TREPONEMA PALLIDUM: CPT

## 2025-04-23 PROCEDURE — 36415 COLL VENOUS BLD VENIPUNCTURE: CPT

## 2025-04-24 ENCOUNTER — ROUTINE PRENATAL (OUTPATIENT)
Dept: OBGYN CLINIC | Facility: MEDICAL CENTER | Age: 30
End: 2025-04-24
Payer: MEDICARE

## 2025-04-24 VITALS — SYSTOLIC BLOOD PRESSURE: 114 MMHG | WEIGHT: 187.3 LBS | BODY MASS INDEX: 34.26 KG/M2 | DIASTOLIC BLOOD PRESSURE: 66 MMHG

## 2025-04-24 DIAGNOSIS — O34.219 PREVIOUS CESAREAN SECTION COMPLICATING PREGNANCY: Primary | ICD-10-CM

## 2025-04-24 DIAGNOSIS — O99.810 ABNORMAL GLUCOSE TOLERANCE TEST (GTT) DURING PREGNANCY, ANTEPARTUM: ICD-10-CM

## 2025-04-24 DIAGNOSIS — Z3A.38 38 WEEKS GESTATION OF PREGNANCY: ICD-10-CM

## 2025-04-24 LAB — TREPONEMA PALLIDUM IGG+IGM AB [PRESENCE] IN SERUM OR PLASMA BY IMMUNOASSAY: NORMAL

## 2025-04-24 PROCEDURE — 99213 OFFICE O/P EST LOW 20 MIN: CPT | Performed by: STUDENT IN AN ORGANIZED HEALTH CARE EDUCATION/TRAINING PROGRAM

## 2025-04-24 NOTE — PROGRESS NOTES
Name: Jacquie Gardner      : 1995      MRN: [id number]  Encounter Provider: Elana Ricci MD  Encounter Date: 2025   Encounter department: [facility]  :  Assessment & Plan  Previous  section complicating pregnancy         Abnormal glucose tolerance test (GTT) during pregnancy, antepartum         38 weeks gestation of pregnancy             History of Present Illness   The patient denies leakage of fluid, pelvic pain, or vaginal bleeding.  Reports normal fetal movement.        Jacquie Gardner is a 29 y.o. female who presents for routine prenatal care at 38w5d    History obtained from: patient    Review of Systems   Constitutional:  Negative for chills and fever.   Respiratory:  Negative for cough and shortness of breath.    Cardiovascular:  Negative for chest pain and leg swelling.   Gastrointestinal:  Negative for abdominal pain, nausea and vomiting.   Genitourinary:  Negative for dysuria, frequency and urgency.   Neurological:  Negative for dizziness, light-headedness and headaches.     Medical History Reviewed by provider this encounter:     .     Objective   /66   Wt 85 kg (187 lb 4.8 oz)   LMP 2024 (Exact Date)   BMI 34.26 kg/m²      Physical Exam  Constitutional:       Appearance: Normal appearance.   HENT:      Head: Normocephalic and atraumatic.   Cardiovascular:      Rate and Rhythm: Normal rate.   Pulmonary:      Effort: Pulmonary effort is normal.   Genitourinary:     Comments: SVE Closed  Skin:     General: Skin is warm.   Neurological:      General: No focal deficit present.      Mental Status: She is alert.   Psychiatric:         Mood and Affect: Mood normal.             Elana Ricci MD  [facility]  [facility]  2025 12:22 PM

## 2025-04-25 ENCOUNTER — HOSPITAL ENCOUNTER (EMERGENCY)
Facility: HOSPITAL | Age: 30
Discharge: HOME | End: 2025-04-26
Attending: EMERGENCY MEDICINE | Admitting: OBSTETRICS & GYNECOLOGY
Payer: MEDICARE

## 2025-04-25 ENCOUNTER — APPOINTMENT (EMERGENCY)
Dept: RADIOLOGY | Facility: HOSPITAL | Age: 30
End: 2025-04-25
Payer: MEDICARE

## 2025-04-25 VITALS
SYSTOLIC BLOOD PRESSURE: 132 MMHG | RESPIRATION RATE: 18 BRPM | OXYGEN SATURATION: 100 % | DIASTOLIC BLOOD PRESSURE: 76 MMHG | TEMPERATURE: 97.8 F | HEART RATE: 98 BPM

## 2025-04-25 DIAGNOSIS — S82.891D CLOSED FRACTURE OF RIGHT ANKLE WITH ROUTINE HEALING: ICD-10-CM

## 2025-04-25 DIAGNOSIS — S82.401A CLOSED RIGHT FIBULAR FRACTURE: Primary | ICD-10-CM

## 2025-04-25 PROCEDURE — 73610 X-RAY EXAM OF ANKLE: CPT

## 2025-04-25 PROCEDURE — 99283 EMERGENCY DEPT VISIT LOW MDM: CPT

## 2025-04-25 PROCEDURE — 99284 EMERGENCY DEPT VISIT MOD MDM: CPT | Performed by: PHYSICIAN ASSISTANT

## 2025-04-25 PROCEDURE — 99213 OFFICE O/P EST LOW 20 MIN: CPT | Performed by: OBSTETRICS & GYNECOLOGY

## 2025-04-25 PROCEDURE — 29515 APPLICATION SHORT LEG SPLINT: CPT | Performed by: PHYSICIAN ASSISTANT

## 2025-04-25 RX ORDER — OXYCODONE HYDROCHLORIDE 5 MG/1
5 TABLET ORAL EVERY 4 HOURS PRN
Refills: 0 | Status: DISCONTINUED | OUTPATIENT
Start: 2025-04-25 | End: 2025-04-26 | Stop reason: HOSPADM

## 2025-04-25 RX ORDER — ACETAMINOPHEN 325 MG/1
650 TABLET ORAL ONCE
Status: COMPLETED | OUTPATIENT
Start: 2025-04-25 | End: 2025-04-25

## 2025-04-25 RX ORDER — OXYCODONE HYDROCHLORIDE 5 MG/1
5 TABLET ORAL EVERY 4 HOURS PRN
Qty: 10 TABLET | Refills: 0 | Status: SHIPPED | OUTPATIENT
Start: 2025-04-25 | End: 2025-05-01

## 2025-04-25 RX ORDER — BACITRACIN, NEOMYCIN, POLYMYXIN B 400; 3.5; 5 [USP'U]/G; MG/G; [USP'U]/G
1 OINTMENT TOPICAL ONCE
Status: COMPLETED | OUTPATIENT
Start: 2025-04-25 | End: 2025-04-25

## 2025-04-25 RX ADMIN — BACITRACIN ZINC, NEOMYCIN, POLYMYXIN B 1 SMALL APPLICATION: 400; 3.5; 5 OINTMENT TOPICAL at 19:01

## 2025-04-25 RX ADMIN — ACETAMINOPHEN 650 MG: 325 TABLET, FILM COATED ORAL at 16:58

## 2025-04-25 RX ADMIN — OXYCODONE HYDROCHLORIDE 5 MG: 5 TABLET ORAL at 22:53

## 2025-04-25 NOTE — ED NOTES
Splint applied at this time by RN and EDT and pt educated on use of crutches at this time.      Monster Simpson RN  04/25/25 1927

## 2025-04-25 NOTE — ED NOTES
RN messaged L&D charge at this time regarding pt's upcoming NST.     Monster Simpson RN  04/25/25 1947       Monster iSmpson RN  04/25/25 1948

## 2025-04-25 NOTE — ED PROVIDER NOTES
Time reflects when diagnosis was documented in both MDM as applicable and the Disposition within this note       Time User Action Codes Description Comment    4/25/2025  5:20 PM Jud Gill Add [S82.401A] Closed right fibular fracture           ED Disposition       ED Disposition   Send to L&D After Provider Eval    Condition   --    Date/Time   Fri Apr 25, 2025  7:02 PM    Comment   --             Assessment & Plan       Medical Decision Making  DDX including but not limited to: contusion, sprain, strain, fracture, dislocation, cellulitis, diabetic foot ulcer, osteomyelitis, lymphangitis, onychomycosis, gout, lyme disease, ischemic limb, tendinitis, plantar fasciitis, diabetic neuropathy, radiculopathy, arthritis, doubt septic arthritis.      Plan - will xray right ankle. Will give tylenol and apply ice. FHT 150bpm    XR ankle 3+ views RIGHT - Nondisplaced distal fibular fracture.      Discussed results with patient. Will need splint and crutches/NWB and follow with ortho.     Discussed with OBGYN resident Dr. Reagan- patient to come upstairs to L+D for NST.     The management plan was discussed in detail with the patient at bedside and all questions were answered.  Prior to discharge, we provided both verbal and written instructions.  We discussed with the patient the signs and symptoms for which to return to the emergency department.  All questions were answered and patient was comfortable with the plan of care and discharged to home.  Instructed the patient to follow up with the primary care provider and/or specialist provided and their written instructions.  The patient verbalized understanding of our discussion and plan of care, and agrees to return to the Emergency Department for concerns and progression of illness.     At discharge, I instructed the patient to:  -follow up with pcp  -follow up with the recommended specialists- ortho and OBGYN  -return to the ER if symptoms worsened or new symptoms  arose  Patient agreed to this plan and was stable at time of discharge.             Amount and/or Complexity of Data Reviewed  Radiology: ordered and independent interpretation performed. Decision-making details documented in ED Course.    Risk  OTC drugs.             Medications   acetaminophen (TYLENOL) tablet 650 mg (650 mg Oral Given 25 1658)   neomycin-bacitracin-polymyxin b (NEOSPORIN) ointment 1 small application (1 small application Topical Given 25 1901)       ED Risk Strat Scores                    No data recorded        SBIRT 22yo+      Flowsheet Row Most Recent Value   Initial Alcohol Screen: US AUDIT-C     1. How often do you have a drink containing alcohol? 0 Filed at: 2025 1645   2. How many drinks containing alcohol do you have on a typical day you are drinking?  0 Filed at: 20255   3b. FEMALE Any Age, or MALE 65+: How often do you have 4 or more drinks on one occassion? 0 Filed at: 20255   Audit-C Score 0 Filed at: 2025   WALT: How many times in the past year have you...    Used an illegal drug or used a prescription medication for non-medical reasons? Never Filed at: 2025                            History of Present Illness       Chief Complaint   Patient presents with    Ankle Pain     Missed a step causing patient to injure right ankle and foot. No meds pta.        Past Medical History:   Diagnosis Date    Asthma     Albuterol prn    Varicella     as child and immunized      Past Surgical History:   Procedure Laterality Date    [place]  DELIVERY ONLY N/A 2020    Procedure:  SECTION ();  Surgeon: Almaz Guzman DO;  Location: [facility];  Service: Obstetrics      Family History   Problem Relation Age of Onset    No Known Problems Mother     No Known Problems Father     No Known Problems Daughter     Other Maternal Grandmother         accidental    Cancer Maternal Grandfather         possible stomach    Breast  cancer Maternal Aunt         late 50s    Colon cancer Neg Hx     Ovarian cancer Neg Hx       Social History     Tobacco Use    Smoking status: Never    Smokeless tobacco: Never   Vaping Use    Vaping status: Never Used   Substance Use Topics    Alcohol use: Not Currently    Drug use: Never      E-Cigarette/Vaping    E-Cigarette Use Never User       E-Cigarette/Vaping Substances    Nicotine No     THC No     CBD No     Flavoring No     Other No     Unknown No       I have reviewed and agree with the history as documented.     Patient is a 29-year-old female who is currently 38w6d pregnant and scheduled for c section delivery on 4/28/25 who presents for evaluation of right ankle pain.  She states just prior to arrival she was walking down the steps outside her house when she missed the last step and landed/twisted her right ankle.  She states that she caught herself with her hands and on her right knee.  She has pain to the right lateral ankle.  She was unable to put weight on the right ankle due to pain.  She has some associated swelling starting to the lateral ankle.  She did not take any medications prior to arrival.  No prior fracture or surgery to the ankle.  She denies fall onto her back/buttock/abdomen.  She denies abdominal or pelvic pain, back pain, cramping, vaginal bleeding, vaginal discharge, leakage of fluids.  No numbness, weakness or other complaints.        Review of Systems   Constitutional:  Negative for chills and fever.   Eyes:  Negative for pain and visual disturbance.   Respiratory:  Negative for shortness of breath.    Cardiovascular:  Negative for chest pain.   Gastrointestinal:  Negative for abdominal pain and vomiting.   Genitourinary:  Negative for difficulty urinating, dysuria, flank pain, hematuria, vaginal bleeding, vaginal discharge and vaginal pain.   Musculoskeletal:  Positive for arthralgias and joint swelling. Negative for back pain.   Skin:  Negative for color change and rash.    Neurological:  Negative for seizures, syncope, weakness and numbness.   All other systems reviewed and are negative.          Objective       ED Triage Vitals   Temperature Pulse Blood Pressure Respirations SpO2 Patient Position - Orthostatic VS   04/25/25 1635 04/25/25 1635 04/25/25 1635 04/25/25 1635 04/25/25 1635 --   97.8 °F (36.6 °C) 98 132/76 18 100 %       Temp src Heart Rate Source BP Location FiO2 (%) Pain Score    -- -- -- -- 04/25/25 1658        10 - Worst Possible Pain      Vitals      Date and Time Temp Pulse SpO2 Resp BP Pain Score FACES Pain Rating User   04/25/25 1658 -- -- -- -- -- 10 - Worst Possible Pain -- NG   04/25/25 1635 97.8 °F (36.6 °C) 98 100 % 18 132/76 -- -- NIKOLAS            Physical Exam  Vitals and nursing note reviewed.   Constitutional:       General: She is not in acute distress.     Appearance: She is well-developed.   HENT:      Head: Normocephalic and atraumatic.      Right Ear: External ear normal.      Left Ear: External ear normal.   Eyes:      Conjunctiva/sclera: Conjunctivae normal.   Cardiovascular:      Rate and Rhythm: Normal rate.   Pulmonary:      Effort: Pulmonary effort is normal. No respiratory distress.   Abdominal:      Comments: Gravid abdomen.    Musculoskeletal:      Right ankle: Swelling present. No deformity, ecchymosis or lacerations. Tenderness present over the lateral malleolus. No medial malleolus, base of 5th metatarsal or proximal fibula tenderness. Decreased range of motion. Normal pulse.      Right Achilles Tendon: Normal. No tenderness or defects.   Skin:     General: Skin is warm and dry.      Capillary Refill: Capillary refill takes less than 2 seconds.   Neurological:      Mental Status: She is alert.   Psychiatric:         Mood and Affect: Mood normal.         Results Reviewed       None            XR ankle 3+ views RIGHT   Final Interpretation by Sanju Fraire MD (04/25 9312)      Nondisplaced distal fibular fracture.         Computerized  Assisted Algorithm (CAA) may have been used to analyze all applicable images.               Workstation performed: [id number]             Splint application    Date/Time: 4/25/2025 7:00 PM    Performed by: Jud Gill PA-C  Authorized by: Jud Gill PA-C    Other Assisting Provider: Yes (comment) (Applied by ED tech and checked by me)    Verbal consent obtained?: Yes    Risks and benefits: Risks, benefits and alternatives were discussed    Consent given by:  Patient  Patient states understanding of procedure being performed: Yes    Radiology Images displayed and confirmed. If images not available, report reviewed: Yes    Patient identity confirmed:  Verbally with patient  Pre-procedure details:     Sensation:  Normal  Procedure details:     Laterality:  Right    Location:  Ankle    Ankle:  R ankle    Splint composition: static      Splint type:  Short leg    Supplies:  Cotton padding, Ortho-Glass and elastic bandage  Post-procedure details:     Pain:  Improved    Sensation:  Normal    Patient tolerance of procedure:  Tolerated well, no immediate complications      ED Medication and Procedure Management   Prior to Admission Medications   Prescriptions Last Dose Informant Patient Reported? Taking?   Prenatal MV-Min-Fe Fum-FA-DHA (PRENATAL 1 PO)   Yes No   Sig: Take by mouth   Probiotic Product (UP4 PROBIOTICS PO)   Yes No   Sig: Take by mouth   albuterol (PROVENTIL HFA,VENTOLIN HFA) 90 mcg/act inhaler   No No   Sig: Inhale 2 puffs every 6 (six) hours as needed for wheezing or shortness of breath      Facility-Administered Medications: None     Current Discharge Medication List        CONTINUE these medications which have NOT CHANGED    Details   albuterol (PROVENTIL HFA,VENTOLIN HFA) 90 mcg/act inhaler Inhale 2 puffs every 6 (six) hours as needed for wheezing or shortness of breath  Qty: 18 g, Refills: 2    Comments: Substitution to a formulary equivalent within the same pharmaceutical class is  authorized.  Associated Diagnoses: Moderate persistent asthma without complication      Prenatal MV-Min-Fe Fum-FA-DHA (PRENATAL 1 PO) Take by mouth      Probiotic Product (UP4 PROBIOTICS PO) Take by mouth             ED SEPSIS DOCUMENTATION   Time reflects when diagnosis was documented in both MDM as applicable and the Disposition within this note       Time User Action Codes Description Comment    4/25/2025  5:20 PM Jud Gill Add [S82.401A] Closed right fibular fracture                  Jud Gill PA-C  04/25/25 7470

## 2025-04-26 ENCOUNTER — ANESTHESIA EVENT (INPATIENT)
Dept: LABOR AND DELIVERY | Facility: HOSPITAL | Age: 30
End: 2025-04-26
Payer: MEDICARE

## 2025-04-26 PROCEDURE — 99211 OFF/OP EST MAY X REQ PHY/QHP: CPT

## 2025-04-26 NOTE — PROGRESS NOTES
Triage Note - OB  Jacquie Gardner 29 y.o. female MRN: [id number]  Unit/Bed#: LD TRIAGE  Encounter: [id number]    Chief Complaint: S/P fall with a broken Ankle      JESE: Estimated Date of Delivery: 5/3/25    HPI: Patient is a  at 38w6d here complaining of s/p fall off the last step she twister her ankle broke the ankle and laned on her hands and knees.    Vitals: Blood pressure 132/76, pulse 98, temperature 97.8 °F (36.6 °C), resp. rate 18, last menstrual period 2024, SpO2 100%, not currently breastfeeding.,There is no height or weight on file to calculate BMI.    Physical Exam  GEN:   SVE: Not evaluated.    FHR: Baseline: 140 bpm, Variability: Marked >25 bpm, Accelerations: Reactive, Decelerations: Absent, and Category 1  [place]: none    Labs: No results found for this or any previous visit (from the past 24 hours).    Imaging:  none needed     Lab, Imaging and other studies: I have personally reviewed pertinent reports.    A/P: Observation x 4 hours   1) RH positive no need for rhogam   2) scheduled for c section on Monday   Will need ortho consult in patient during admission   3) Discharge instructions given to patient and labor precautions reviewed.    Obed Ross MD  2025  8:53 PM

## 2025-04-28 ENCOUNTER — ANESTHESIA (INPATIENT)
Dept: LABOR AND DELIVERY | Facility: HOSPITAL | Age: 30
End: 2025-04-28
Payer: MEDICARE

## 2025-04-28 ENCOUNTER — HOSPITAL ENCOUNTER (INPATIENT)
Facility: HOSPITAL | Age: 30
LOS: 3 days | Discharge: HOME | End: 2025-05-01
Attending: OBSTETRICS & GYNECOLOGY | Admitting: OBSTETRICS & GYNECOLOGY
Payer: MEDICARE

## 2025-04-28 DIAGNOSIS — Z98.891 STATUS POST REPEAT LOW TRANSVERSE CESAREAN SECTION: ICD-10-CM

## 2025-04-28 DIAGNOSIS — S82.891D CLOSED FRACTURE OF RIGHT ANKLE WITH ROUTINE HEALING: ICD-10-CM

## 2025-04-28 DIAGNOSIS — Z3A.38 38 WEEKS GESTATION OF PREGNANCY: Primary | ICD-10-CM

## 2025-04-28 PROBLEM — S82.831D CLOSED FRACTURE OF DISTAL END OF RIGHT FIBULA WITH ROUTINE HEALING: Status: ACTIVE | Noted: 2025-04-28

## 2025-04-28 PROBLEM — Z90.79 STATUS POST BILATERAL SALPINGECTOMY: Status: ACTIVE | Noted: 2025-04-28

## 2025-04-28 LAB
ABO GROUP BLD: NORMAL
BASE EXCESS BLDCOA CALC-SCNC: -1.2 MMOL/L (ref 3–11)
BASE EXCESS BLDCOV CALC-SCNC: -2.5 MMOL/L (ref 1–9)
BLD GP AB SCN SERPL QL: NEGATIVE
ERYTHROCYTE [DISTWIDTH] IN BLOOD BY AUTOMATED COUNT: 13.7 % (ref 11.6–15.1)
HCO3 BLDCOA-SCNC: 24.4 MMOL/L (ref 17.3–27.3)
HCO3 BLDCOV-SCNC: 22.4 MMOL/L (ref 12.2–28.6)
HCT VFR BLD AUTO: 39.2 % (ref 34.8–46.1)
HGB BLD-MCNC: 13.2 G/DL (ref 11.5–15.4)
HOLD SPECIMEN: YES
MCH RBC QN AUTO: 28.2 PG (ref 26.8–34.3)
MCHC RBC AUTO-ENTMCNC: 33.7 G/DL (ref 31.4–37.4)
MCV RBC AUTO: 84 FL (ref 82–98)
O2 CT VFR BLDCOA CALC: 13.2 ML/DL
OXYHGB MFR BLDCOA: 61.5 %
OXYHGB MFR BLDCOV: 66.2 %
PCO2 BLDCOA: 43.7 MM[HG] (ref 30–60)
PCO2 BLDCOV: 39.4 MM HG (ref 27–43)
PH BLDCOA: 7.36 [PH] (ref 7.23–7.43)
PH BLDCOV: 7.37 [PH] (ref 7.19–7.49)
PLATELET # BLD AUTO: 299 THOUSANDS/UL (ref 149–390)
PMV BLD AUTO: 9.5 FL (ref 8.9–12.7)
PO2 BLDCOA: 24.3 MM HG (ref 5–25)
PO2 BLDCOV: 26.5 MM HG (ref 15–45)
RBC # BLD AUTO: 4.68 MILLION/UL (ref 3.81–5.12)
RH BLD: POSITIVE
SAO2 % BLDCOV: 14 ML/DL
SPECIMEN EXPIRATION DATE: NORMAL
TREPONEMA PALLIDUM IGG+IGM AB [PRESENCE] IN SERUM OR PLASMA BY IMMUNOASSAY: NORMAL
WBC # BLD AUTO: 7.52 THOUSAND/UL (ref 4.31–10.16)

## 2025-04-28 PROCEDURE — 94640 AIRWAY INHALATION TREATMENT: CPT

## 2025-04-28 PROCEDURE — 88302 TISSUE EXAM BY PATHOLOGIST: CPT | Performed by: SPECIALIST

## 2025-04-28 PROCEDURE — 94664 DEMO&/EVAL PT USE INHALER: CPT

## 2025-04-28 PROCEDURE — 94762 N-INVAS EAR/PLS OXIMTRY CONT: CPT

## 2025-04-28 PROCEDURE — 0UB70ZZ EXCISION OF BILATERAL FALLOPIAN TUBES, OPEN APPROACH: ICD-10-PCS | Performed by: OBSTETRICS & GYNECOLOGY

## 2025-04-28 PROCEDURE — 86901 BLOOD TYPING SEROLOGIC RH(D): CPT

## 2025-04-28 PROCEDURE — 86780 TREPONEMA PALLIDUM: CPT

## 2025-04-28 PROCEDURE — 86850 RBC ANTIBODY SCREEN: CPT

## 2025-04-28 PROCEDURE — 94760 N-INVAS EAR/PLS OXIMETRY 1: CPT

## 2025-04-28 PROCEDURE — 86900 BLOOD TYPING SEROLOGIC ABO: CPT

## 2025-04-28 PROCEDURE — NC001 PR NO CHARGE: Performed by: OBSTETRICS & GYNECOLOGY

## 2025-04-28 PROCEDURE — 58611 LIGATE OVIDUCT(S) ADD-ON: CPT | Performed by: OBSTETRICS & GYNECOLOGY

## 2025-04-28 PROCEDURE — 85027 COMPLETE CBC AUTOMATED: CPT

## 2025-04-28 PROCEDURE — 99254 IP/OBS CNSLTJ NEW/EST MOD 60: CPT | Performed by: STUDENT IN AN ORGANIZED HEALTH CARE EDUCATION/TRAINING PROGRAM

## 2025-04-28 PROCEDURE — 82805 BLOOD GASES W/O2 SATURATION: CPT | Performed by: OBSTETRICS & GYNECOLOGY

## 2025-04-28 PROCEDURE — 59514 CESAREAN DELIVERY ONLY: CPT | Performed by: OBSTETRICS & GYNECOLOGY

## 2025-04-28 RX ORDER — PHENYLEPHRINE HCL IN 0.9% NACL 1 MG/10 ML
SYRINGE (ML) INTRAVENOUS
Status: DISPENSED
Start: 2025-04-28 | End: 2025-04-28

## 2025-04-28 RX ORDER — ALBUTEROL SULFATE 0.83 MG/ML
2.5 SOLUTION RESPIRATORY (INHALATION) ONCE
Status: COMPLETED | OUTPATIENT
Start: 2025-04-28 | End: 2025-04-28

## 2025-04-28 RX ORDER — NALOXONE HYDROCHLORIDE 0.4 MG/ML
0.1 INJECTION, SOLUTION INTRAMUSCULAR; INTRAVENOUS; SUBCUTANEOUS
Status: ACTIVE | OUTPATIENT
Start: 2025-04-28 | End: 2025-04-29

## 2025-04-28 RX ORDER — SIMETHICONE 80 MG
80 TABLET,CHEWABLE ORAL 4 TIMES DAILY PRN
Status: DISCONTINUED | OUTPATIENT
Start: 2025-04-28 | End: 2025-05-01 | Stop reason: HOSPADM

## 2025-04-28 RX ORDER — MORPHINE SULFATE 0.5 MG/ML
INJECTION, SOLUTION EPIDURAL; INTRATHECAL; INTRAVENOUS AS NEEDED
Status: DISCONTINUED | OUTPATIENT
Start: 2025-04-28 | End: 2025-04-28

## 2025-04-28 RX ORDER — FENTANYL CITRATE/PF 50 MCG/ML
25 SYRINGE (ML) INJECTION
Status: DISCONTINUED | OUTPATIENT
Start: 2025-04-28 | End: 2025-05-01 | Stop reason: HOSPADM

## 2025-04-28 RX ORDER — IBUPROFEN 600 MG/1
600 TABLET, FILM COATED ORAL EVERY 6 HOURS
Status: DISCONTINUED | OUTPATIENT
Start: 2025-04-29 | End: 2025-05-01 | Stop reason: HOSPADM

## 2025-04-28 RX ORDER — OXYTOCIN/RINGER'S LACTATE 30/500 ML
PLASTIC BAG, INJECTION (ML) INTRAVENOUS
Status: COMPLETED
Start: 2025-04-28 | End: 2025-04-28

## 2025-04-28 RX ORDER — BUPIVACAINE HYDROCHLORIDE 7.5 MG/ML
INJECTION, SOLUTION INTRASPINAL AS NEEDED
Status: DISCONTINUED | OUTPATIENT
Start: 2025-04-28 | End: 2025-04-28

## 2025-04-28 RX ORDER — KETOROLAC TROMETHAMINE 30 MG/ML
15 INJECTION, SOLUTION INTRAMUSCULAR; INTRAVENOUS EVERY 6 HOURS SCHEDULED
Status: DISPENSED | OUTPATIENT
Start: 2025-04-28 | End: 2025-04-29

## 2025-04-28 RX ORDER — ENOXAPARIN SODIUM 100 MG/ML
40 INJECTION SUBCUTANEOUS DAILY
Status: DISCONTINUED | OUTPATIENT
Start: 2025-04-29 | End: 2025-05-01 | Stop reason: HOSPADM

## 2025-04-28 RX ORDER — ACETAMINOPHEN 325 MG/1
650 TABLET ORAL EVERY 6 HOURS SCHEDULED
Status: DISPENSED | OUTPATIENT
Start: 2025-04-28 | End: 2025-05-01

## 2025-04-28 RX ORDER — KETOROLAC TROMETHAMINE 30 MG/ML
30 INJECTION, SOLUTION INTRAMUSCULAR; INTRAVENOUS EVERY 6 HOURS PRN
Status: DISCONTINUED | OUTPATIENT
Start: 2025-04-28 | End: 2025-04-29

## 2025-04-28 RX ORDER — ONDANSETRON 2 MG/ML
4 INJECTION INTRAMUSCULAR; INTRAVENOUS EVERY 8 HOURS PRN
Status: DISCONTINUED | OUTPATIENT
Start: 2025-04-28 | End: 2025-05-01 | Stop reason: HOSPADM

## 2025-04-28 RX ORDER — ONDANSETRON 2 MG/ML
4 INJECTION INTRAMUSCULAR; INTRAVENOUS ONCE AS NEEDED
Status: DISCONTINUED | OUTPATIENT
Start: 2025-04-28 | End: 2025-05-01 | Stop reason: HOSPADM

## 2025-04-28 RX ORDER — SODIUM CHLORIDE, SODIUM LACTATE, POTASSIUM CHLORIDE, CALCIUM CHLORIDE 600; 310; 30; 20 MG/100ML; MG/100ML; MG/100ML; MG/100ML
125 INJECTION, SOLUTION INTRAVENOUS CONTINUOUS
Status: DISCONTINUED | OUTPATIENT
Start: 2025-04-28 | End: 2025-04-29

## 2025-04-28 RX ORDER — ALBUTEROL SULFATE 90 UG/1
2 INHALANT RESPIRATORY (INHALATION) EVERY 6 HOURS PRN
Status: DISCONTINUED | OUTPATIENT
Start: 2025-04-28 | End: 2025-05-01 | Stop reason: HOSPADM

## 2025-04-28 RX ORDER — OXYTOCIN/RINGER'S LACTATE 30/500 ML
PLASTIC BAG, INJECTION (ML) INTRAVENOUS CONTINUOUS PRN
Status: DISCONTINUED | OUTPATIENT
Start: 2025-04-28 | End: 2025-04-28

## 2025-04-28 RX ORDER — OXYTOCIN/RINGER'S LACTATE 30/500 ML
PLASTIC BAG, INJECTION (ML) INTRAVENOUS
Status: DISPENSED
Start: 2025-04-28 | End: 2025-04-28

## 2025-04-28 RX ORDER — PHENYLEPHRINE HYDROCHLORIDE 10 MG/ML
INJECTION INTRAVENOUS
Status: DISPENSED
Start: 2025-04-28 | End: 2025-04-28

## 2025-04-28 RX ORDER — OXYCODONE HYDROCHLORIDE 5 MG/1
5 TABLET ORAL EVERY 4 HOURS PRN
Status: DISCONTINUED | OUTPATIENT
Start: 2025-04-29 | End: 2025-05-01 | Stop reason: HOSPADM

## 2025-04-28 RX ORDER — OXYCODONE HYDROCHLORIDE 10 MG/1
10 TABLET ORAL EVERY 4 HOURS PRN
Status: DISCONTINUED | OUTPATIENT
Start: 2025-04-29 | End: 2025-05-01 | Stop reason: HOSPADM

## 2025-04-28 RX ORDER — ONDANSETRON 2 MG/ML
4 INJECTION INTRAMUSCULAR; INTRAVENOUS EVERY 6 HOURS PRN
Status: ACTIVE | OUTPATIENT
Start: 2025-04-28 | End: 2025-04-29

## 2025-04-28 RX ORDER — FENTANYL CITRATE 50 UG/ML
INJECTION, SOLUTION INTRAMUSCULAR; INTRAVENOUS
Status: COMPLETED
Start: 2025-04-28 | End: 2025-04-28

## 2025-04-28 RX ORDER — CEFAZOLIN SODIUM 2 G/50ML
2000 SOLUTION INTRAVENOUS ONCE
Status: COMPLETED | OUTPATIENT
Start: 2025-04-28 | End: 2025-04-28

## 2025-04-28 RX ORDER — OXYCODONE HYDROCHLORIDE 5 MG/1
5 TABLET ORAL EVERY 4 HOURS PRN
Refills: 0 | Status: DISCONTINUED | OUTPATIENT
Start: 2025-04-28 | End: 2025-05-01 | Stop reason: HOSPADM

## 2025-04-28 RX ORDER — FENTANYL CITRATE 50 UG/ML
INJECTION, SOLUTION INTRAMUSCULAR; INTRAVENOUS AS NEEDED
Status: DISCONTINUED | OUTPATIENT
Start: 2025-04-28 | End: 2025-04-28

## 2025-04-28 RX ORDER — NALBUPHINE HYDROCHLORIDE 10 MG/ML
2 INJECTION INTRAMUSCULAR; INTRAVENOUS; SUBCUTANEOUS
Status: ACTIVE | OUTPATIENT
Start: 2025-04-28 | End: 2025-04-29

## 2025-04-28 RX ORDER — SODIUM CHLORIDE, SODIUM LACTATE, POTASSIUM CHLORIDE, CALCIUM CHLORIDE 600; 310; 30; 20 MG/100ML; MG/100ML; MG/100ML; MG/100ML
125 INJECTION, SOLUTION INTRAVENOUS CONTINUOUS
Status: DISCONTINUED | OUTPATIENT
Start: 2025-04-28 | End: 2025-04-28

## 2025-04-28 RX ORDER — ALBUTEROL SULFATE 0.83 MG/ML
2.5 SOLUTION RESPIRATORY (INHALATION) ONCE
Status: DISCONTINUED | OUTPATIENT
Start: 2025-04-28 | End: 2025-04-28

## 2025-04-28 RX ORDER — MORPHINE SULFATE 0.5 MG/ML
INJECTION, SOLUTION EPIDURAL; INTRATHECAL; INTRAVENOUS
Status: COMPLETED
Start: 2025-04-28 | End: 2025-04-28

## 2025-04-28 RX ORDER — ONDANSETRON 2 MG/ML
4 INJECTION INTRAMUSCULAR; INTRAVENOUS EVERY 8 HOURS PRN
Status: DISCONTINUED | OUTPATIENT
Start: 2025-04-28 | End: 2025-04-28

## 2025-04-28 RX ORDER — CALCIUM CARBONATE 500 MG/1
1000 TABLET, CHEWABLE ORAL DAILY PRN
Status: DISCONTINUED | OUTPATIENT
Start: 2025-04-28 | End: 2025-05-01 | Stop reason: HOSPADM

## 2025-04-28 RX ADMIN — PHENYLEPHRINE HYDROCHLORIDE 50 MCG/MIN: 10 INJECTION INTRAVENOUS at 08:28

## 2025-04-28 RX ADMIN — SODIUM CHLORIDE, SODIUM LACTATE, POTASSIUM CHLORIDE, AND CALCIUM CHLORIDE 1000 ML: .6; .31; .03; .02 INJECTION, SOLUTION INTRAVENOUS at 06:39

## 2025-04-28 RX ADMIN — CEFAZOLIN SODIUM 2000 MG: 2 SOLUTION INTRAVENOUS at 08:21

## 2025-04-28 RX ADMIN — KETOROLAC TROMETHAMINE 15 MG: 30 INJECTION, SOLUTION INTRAMUSCULAR; INTRAVENOUS at 18:11

## 2025-04-28 RX ADMIN — OXYCODONE 5 MG: 5 TABLET ORAL at 23:11

## 2025-04-28 RX ADMIN — ONDANSETRON 4 MG: 2 INJECTION INTRAMUSCULAR; INTRAVENOUS at 10:57

## 2025-04-28 RX ADMIN — SODIUM CHLORIDE, SODIUM LACTATE, POTASSIUM CHLORIDE, AND CALCIUM CHLORIDE 999 ML/HR: .6; .31; .03; .02 INJECTION, SOLUTION INTRAVENOUS at 07:39

## 2025-04-28 RX ADMIN — ALBUTEROL SULFATE 2.5 MG: 2.5 SOLUTION RESPIRATORY (INHALATION) at 07:22

## 2025-04-28 RX ADMIN — FENTANYL CITRATE 15 MCG: 50 INJECTION INTRAMUSCULAR; INTRAVENOUS at 08:21

## 2025-04-28 RX ADMIN — MORPHINE SULFATE 0.1 MG: 0.5 INJECTION, SOLUTION EPIDURAL; INTRATHECAL; INTRAVENOUS at 08:21

## 2025-04-28 RX ADMIN — SODIUM CHLORIDE, SODIUM LACTATE, POTASSIUM CHLORIDE, AND CALCIUM CHLORIDE: .6; .31; .03; .02 INJECTION, SOLUTION INTRAVENOUS at 08:47

## 2025-04-28 RX ADMIN — Medication 62.5 MILLI-UNITS/MIN: at 09:41

## 2025-04-28 RX ADMIN — ACETAMINOPHEN 650 MG: 325 TABLET, FILM COATED ORAL at 22:30

## 2025-04-28 RX ADMIN — SODIUM CHLORIDE, SODIUM LACTATE, POTASSIUM CHLORIDE, AND CALCIUM CHLORIDE 125 ML/HR: .6; .31; .03; .02 INJECTION, SOLUTION INTRAVENOUS at 17:00

## 2025-04-28 RX ADMIN — KETOROLAC TROMETHAMINE 15 MG: 30 INJECTION, SOLUTION INTRAMUSCULAR; INTRAVENOUS at 12:16

## 2025-04-28 RX ADMIN — PHENYLEPHRINE HYDROCHLORIDE 30 MCG/MIN: 10 INJECTION INTRAVENOUS at 08:09

## 2025-04-28 RX ADMIN — BUPIVACAINE HYDROCHLORIDE IN DEXTROSE 2 ML: 7.5 INJECTION, SOLUTION SUBARACHNOID at 08:21

## 2025-04-28 RX ADMIN — Medication 250 MILLI-UNITS/MIN: at 08:44

## 2025-04-28 RX ADMIN — ACETAMINOPHEN 650 MG: 325 TABLET, FILM COATED ORAL at 15:59

## 2025-04-28 RX ADMIN — SODIUM CHLORIDE, SODIUM LACTATE, POTASSIUM CHLORIDE, AND CALCIUM CHLORIDE 500 ML: .6; .31; .03; .02 INJECTION, SOLUTION INTRAVENOUS at 18:02

## 2025-04-28 NOTE — PLAN OF CARE
Problem: POSTPARTUM  Goal: Experiences normal postpartum course  Description: INTERVENTIONS:- Monitor maternal vital signs- Assess uterine involution and lochia  Outcome: Progressing  Goal: Appropriate maternal -  bonding  Description: INTERVENTIONS:- Identify family support- Assess for appropriate maternal/infant bonding -Encourage maternal/infant bonding opportunities- Referral to  or  as needed  Outcome: Progressing  Goal: Establishment of infant feeding pattern  Description: INTERVENTIONS:- Assess breast/bottle feeding- Refer to lactation as needed  Outcome: Progressing  Goal: Incision(s), wounds(s) or drain site(s) healing without S/S of infection  Description: INTERVENTIONS- Assess and document dressing, incision, wound bed, drain sites and surrounding tissue- Provide patient and family education- Perform skin care/dressing changes every day  Outcome: Progressing

## 2025-04-28 NOTE — H&P
H&P Exam - Obstetrics   Jacquie Gardner 29 y.o. female MRN: [id number]  Unit/Bed#:  303-01 Encounter: [id number]      History of Present Illness     Chief Complaint: Repeat  section    HPI:  Jacquie Gardner is a 29 y.o.  female with an JESE of 5/3/2025, by Last Menstrual Period at 39w2d weeks gestation who is being admitted for RLTCS and bilateral salpingectomy.    Contractions: no  Loss of fluid: no  Vaginal bleeding: no  Fetal movement: yes    She is an [facility] patient.     PREGNANCY COMPLICATIONS:   1) Asthma--Albuterol PRN    OB History    Para Term  AB Living   2 1 1 0 0 1   SAB IAB Ectopic Multiple Live Births   0 0 0 0 1      # Outcome Date GA Lbr Antolin/2nd Weight Sex Type Anes PTL Lv   2 Current            1 Term 20 40w1d  3380 g (7 lb 7.2 oz) F CS-LTranv EPI  NATALI      Birth Comments: Dr. Brandon present for delivery in OR       Baby complications/comments: none    Review of Systems   Constitutional:  Negative for chills and fever.   Eyes:  Negative for visual disturbance.   Respiratory:  Negative for shortness of breath.    Cardiovascular:  Negative for chest pain and palpitations.   Gastrointestinal:  Negative for abdominal pain.   Genitourinary:  Negative for vaginal bleeding, vaginal discharge and vaginal pain.   Musculoskeletal:  Negative for back pain.   Neurological:  Negative for headaches.       Historical Information   Past Medical History:   Diagnosis Date    Asthma     Albuterol prn    Varicella     as child and immunized     Past Surgical History:   Procedure Laterality Date    [place]  DELIVERY ONLY N/A 2020    Procedure:  SECTION ();  Surgeon: Almaz Guzman DO;  Location: [facility];  Service: Obstetrics     Social History   Social History     Substance and Sexual Activity   Alcohol Use Not Currently     Social History     Substance and Sexual Activity   Drug Use Never     Social History     Tobacco Use   Smoking Status Never    Smokeless Tobacco Never     Family History: Family history non-contributory    Meds/Allergies    {  Medications Prior to Admission:     albuterol (PROVENTIL HFA,VENTOLIN HFA) 90 mcg/act inhaler    oxyCODONE (Roxicodone) 5 immediate release tablet    Prenatal MV-Min-Fe Fum-FA-DHA (PRENATAL 1 PO)    Probiotic Product (UP4 PROBIOTICS PO)     Allergies   Allergen Reactions    Shellfish-Derived Products - Food Allergy        OBJECTIVE:    Vitals:   /70 (BP Location: Right arm)   Pulse 88   Temp 98.5 °F (36.9 °C) (Oral)   Resp 14   LMP 07/27/2024 (Exact Date)   There is no height or weight on file to calculate BMI.    Physical Exam  HENT:      Head: Normocephalic.   Eyes:      Conjunctiva/sclera: Conjunctivae normal.   Cardiovascular:      Rate and Rhythm: Normal rate.      Pulses: Normal pulses.   Pulmonary:      Effort: Pulmonary effort is normal.   Abdominal:      Palpations: Abdomen is soft.      Tenderness: There is no abdominal tenderness.   Skin:     General: Skin is warm.   Neurological:      Mental Status: She is alert and oriented to person, place, and time.   Psychiatric:         Mood and Affect: Mood normal.           Fetal heart rate:   140 bpm with moderate variability, accelerations, no decelerations    [place]:   No contractions    EFW: 50%    Prenatal Labs: Blood Type:   Lab Results   Component Value Date/Time    ABO Grouping O 04/23/2025 11:22 AM     , D (Rh type):   Lab Results   Component Value Date/Time    Rh Factor Positive 04/23/2025 11:22 AM     , Antibody Screen:   Lab Results   Component Value Date/Time    Antibody Screen Negative 04/23/2025 11:22 AM    , HCT/HGB:   Lab Results   Component Value Date/Time    Hematocrit 39.2 04/28/2025 06:49 AM    Hemoglobin 13.2 04/28/2025 06:49 AM      , MCV:   Lab Results   Component Value Date/Time    MCV 84 04/28/2025 06:49 AM      , Platelets:   Lab Results   Component Value Date/Time    Platelets 299 04/28/2025 06:49 AM      , 1 hour Glucola:   Lab  "Results   Component Value Date/Time    Glucose 141 (H) 2025 11:04 AM   , 3 hour GTT:   Lab Results   Component Value Date/Time    Glucose, GTT - 3 Hour 90 2025 01:02 PM   , Varicella: No results found for: \"VARICELLAIGG\"    , Rubella:   Lab Results   Component Value Date/Time    Rubella IgG Quant 15.6 10/17/2024 11:12 AM        , Syphilis:   Lab Results   Component Value Date/Time    Syphilis Total Antibody Non-reactive 2025 11:04 AM      , Hep B:   Lab Results   Component Value Date/Time    Hepatitis B Surface Ag Non-reactive 10/17/2024 11:12 AM     , Hep C:   Lab Results   Component Value Date/Time    Hepatitis C Ab Non-reactive 10/17/2024 11:12 AM      , HIV:   Lab Results   Component Value Date/Time    HIV-1 p24 Antigen Non-Reactive 10/17/2024 11:12 AM     , Chlamydia:   Lab Results   Component Value Date/Time    Chlamydia trachomatis, DNA Probe Negative 10/29/2024 11:58 AM     , Gonorrhea:   Lab Results   Component Value Date/Time    N gonorrhoeae, DNA Probe Negative 10/29/2024 11:58 AM     , Group B Strep:    Lab Results   Component Value Date/Time    Strep Grp B PCR Negative 2025 10:37 AM          Invasive Devices       Peripheral Intravenous Line  Duration             Peripheral IV 25 Left;Dorsal (posterior) Hand <1 day                      Assessment & Plan     ASSESSMENT:    30yo  at 39w2d weeks gestation who is being admitted for repeat  section and bilateral salpingectomy    PLAN:   1) Admit   2) CBC, RPR, Blood Type   3) Contraception: bilateral salpingectomy   4) Anesthesia consult for spinal   5) Ancef 2 gm for ppx   6) D/w Ardite    This patient will be an INPATIENT and I certify the anticipated length of stay is >2 Midnights.      Princess Blake MD  2025  7:02 AM     "

## 2025-04-28 NOTE — CONSULTS
Consultation - Orthopedics   Name: Jacquie Gardner 29 y.o. female I MRN: [id number]  Unit/Bed#: -01 I Date of Admission: 2025   Date of Service: 2025 I Hospital Day: 0   Inpatient consult to Orthopedic Surgery  Consult performed by: Christian Coto PA-C  Consult ordered by: Princess Blake MD        Physician Requesting Evaluation: Obed Ross MD   Reason for Evaluation / Principal Problem: right ankle injury    Assessment & Plan  Closed fracture of distal end of right fibula with routine healing  Right non-displaced distal fibula fracture after mechanical fall from missing a step 25.    Conservative management recommended at this time  Pain Control  NWB RLE in short leg splint  PT/OT  Ice and elevation  Follow up as outpt in 7-10 days.  Status post repeat low transverse  section    Ok for discharge from Orthopedics service perspective.  Orthopedics service signing off.    History of Present Illness   HPI: Jacquie Gardner is a 29 y.o. year old female who sustained an injury to her right ankle on 25. She states she was walking down the steps outside her home and missed the last step resulting in an injury to her right ankle. The injury occurred on 25. She denies hitting her head or LOC. After the injury she noted severe pain in the right ankle and inability to bear weight on the RLE. She reported to the ED where x-rays were performed revealing a non-displaced distal fibula fracture. She was placed in a short leg splint. Patient had a scheduled  planned this am and orthopedics was consulted to evaluate her right ankle. Patient had spinal anesthesia for her  this am and is currently without pain in the right ankle. She has no sensation in the b/l LEs from the spinal as well. No fever/chills.    Review of Systems significant for findings described in the HPI.  Historical Information   Past Medical History:   Diagnosis Date    Asthma      Albuterol prn    Varicella     as child and immunized     Past Surgical History:   Procedure Laterality Date    [place]  DELIVERY ONLY N/A 2020    Procedure:  SECTION ();  Surgeon: Almaz Guzman DO;  Location: [facility];  Service: Obstetrics     Social History     Tobacco Use    Smoking status: Never    Smokeless tobacco: Never   Vaping Use    Vaping status: Never Used   Substance and Sexual Activity    Alcohol use: Not Currently    Drug use: Never    Sexual activity: Yes     Partners: Male     Birth control/protection: None     E-Cigarette/Vaping    E-Cigarette Use Never User      E-Cigarette/Vaping Substances    Nicotine No     THC No     CBD No     Flavoring No     Other No     Unknown No      Family history non-contributory    Objective :  Temp:  [98.5 °F (36.9 °C)] 98.5 °F (36.9 °C)  HR:  [88] 88  BP: (119)/(70) 119/70  Resp:  [14] 14  SpO2:  [100 %] 100 %  O2 Device: None (Room air)  Physical ExamOrtho Exam   Musculoskeletal: Right Ankle  Short leg splint intact.  No TTP distal fibula  Patient is s/p spinal anesthesia from  earlier this am.  Unable to assess motor/sensory function secondary to spinal anesthesia  Skin warm and well perfused.    Eyes: Anicteric sclerae.  ENT: Trachea midline.  Lungs: Normal respiratory effort.  CV: Capillary refill is less than 2 seconds.  Skin: Intact without erythema.  Lymph: No palpable lymphadenopathy.  Neuro: Sensation is grossly intact to light touch.  Psych: Mood and affect are appropriate.       Lab Results: I have reviewed the following results:   Recent Labs     25  0649   WBC 7.52   HGB 13.2   HCT 39.2          Imaging Results Review: I personally reviewed the following image studies in PACS and associated radiology reports: xray(s). My interpretation of the radiology images/reports is:  .  X-rays right ankle 25: non-displaced oblique distal fibula fracture. Ankle mortise appears intact. No dislocation. No  significant degenerative changes.

## 2025-04-28 NOTE — PROGRESS NOTES
"Post Op Check - OB/GYN  Jacquie Gardner 29 y.o. female MRN: [id number]  Unit/Bed#: -01 Encounter: [id number]    Chief concern: POD#0 s/p repeat low transverse  section and bilateral salpingectomy    SUBJECTIVE:  Jacquie is tired and experiencing moderate pain, but is overall feeling well  Tolerating PO: yes  Voiding: no, Chin catheter in place  Flatus: no  Ambulating: no  Breastfeeding: yes  Chest pain: no  Shortness of breath: no  Lightheadedness: no  Lochia: wnl    OBJECTIVE:  Vitals:   /69 (BP Location: Right arm)   Pulse 96   Temp 98.5 °F (36.9 °C) (Oral)   Resp 18   Ht 5' 2\" (1.575 m)   Wt 85 kg (187 lb 4.8 oz)   LMP 2024 (Exact Date)   SpO2 98%   Breastfeeding Yes   BMI 34.26 kg/m²       Intake/Output Summary (Last 24 hours) at 2025 1719  Last data filed at 2025 0918  Gross per 24 hour   Intake 1132.2 ml   Output 999 ml   Net 133.2 ml     Invasive Devices       Peripheral Intravenous Line  Duration             Peripheral IV 25 Left;Dorsal (posterior) Hand <1 day              Drain  Duration             Urethral Catheter <1 day                  Physical Exam:   GEN: well-appearing, alert and oriented x3  PULMONARY: normal respirations on room air  ABDOMEN: soft, moderately tender, nondistended, fundus firm below the umbilicus, incision c/d/i  : Cihn catheter in place draining dark yellow urine  EXTREMITIES: SCDs in place    Labs:   Admission on 2025   Component Date Value    ABO Grouping 2025 O     Rh Factor 2025 Positive     Antibody Screen 2025 Negative     Specimen Expiration Date 2025 [id number]     WBC 2025 7.52     RBC 2025 4.68     Hemoglobin 2025 13.2     Hematocrit 2025 39.2     MCV 2025 84     MCH 2025 28.2     MCHC 2025 33.7     RDW 2025 13.7     Platelets 2025 299     MPV 2025 9.5     Extra Tube 2025 Yes     Treponema Pallidium Tota* 2025 " Non-reactive     pH, Cord Jv 2025 7.373     pCO2, Cord Jv 2025 39.4     pO2, Cord Jv 2025 26.5     HCO3, Cord Jv 2025 22.4     Base Exc, Cord Jv 2025 -2.5 (L)     O2 Cont, Cord Jv 2025 14.0     O2 HGB,VENOUS CORD 2025 66.2     pH, Cord Art 2025 7.364     pCO2, Cord Art 2025 43.7     pO2, Cord Art 2025 24.3     HCO3, Cord Art 2025 24.4     Base Exc, Cord Art 2025 -1.2 (L)     O2 Content, Cord Art 2025 13.2     O2 Hgb, Arterial Cord 2025 61.5      ASSESSMENT:  #S/p repeat low transverse  section and bilateral salpingectomy - postop day 0, stable    PLAN:  Hgb 13.2g/dL --> f/u AM CBC   Chin in place, urinary output 0.44cc/kg/hr over past 8hrs -> LR 500cc bolus ordered   Advance diet as tolerated   Encourage ambulation   Encourage breastfeeding   Anticipate discharge POD2-4    Naresh Hunter MD  OBGYN PGY-1  25  5:19 PM

## 2025-04-28 NOTE — DISCHARGE SUMMARY
Discharge Summary - OB/GYN   Jacquie Gardner 29 y.o. female MRN: [id number]  Unit/Bed#: LD OR  Encounter: [id number]      Admission Date: 2025     Discharge Date: 2025    Admitting Diagnosis:   1. Pregnancy at 39w2d  2. Asthma  3. Closed R ankle fracture    Discharge Diagnosis:   Same, delivered    Procedures: repeat  section, low transverse incision and bilateral salpingectomy    Attending: Obed Ross MD    Hospital Course:     Jacquie Gardner is a 29 y.o.  at 39w2d wks who was initially admitted for RLTCS and bilateral salpingectomy.     She delivered a viable male  on 2025 at 0842. Weight 6lbs 14.4oz via repeat  section, low transverse incision. Apgars were 8 (1 min) and 9 (5 min).  was transferred to  nursery. Patient tolerated the procedure well and was transferred to recovery in stable condition.     Her post-partum course was uncomplicated.  Her post-partum pain was well controlled with oral analgesics.    On day of discharge, she was ambulating and able to reasonably perform all ADLs. She was voiding and had appropriate bowel function. Pain was well controlled. She was discharged home on post-partum day #3 without complications. Patient was instructed to follow up with her OB as an outpatient and was given appropriate warnings to call provider if she develops signs of infection or uncontrolled pain.    Complications: none apparent    Condition at discharge: good     Discharge instructions/Information to patient and family:   See after visit summary for information provided to patient and family.      Provisions for Follow-Up Care:  See after visit summary for information related to follow-up care and any pertinent home health orders.      Disposition: See After Visit Summary for discharge disposition information.    Planned Readmission: No    Discharge Medications:  For a complete list of the patient's medications, please refer to  her med rec.

## 2025-04-28 NOTE — ANESTHESIA POSTPROCEDURE EVALUATION
Post-Op Assessment Note    CV Status:  Stable  Pain Score: 1    Pain management: adequate       Mental Status:  Alert and awake   Hydration Status:  Euvolemic   PONV Controlled:  Controlled   Airway Patency:  Patent     Post Op Vitals Reviewed: Yes    No anethesia notable event occurred.    Staff: Anesthesiologist           Last Filed PACU Vitals:  Vitals Value Taken Time   Temp 96.6 °F (35.9 °C) 04/28/25 1005   Pulse 106 04/28/25 1005   BP 95/53 04/28/25 1005   Resp 16 04/28/25 1005   SpO2 100 % 04/28/25 1005

## 2025-04-28 NOTE — ANESTHESIA PROCEDURE NOTES
Spinal Block    Patient location during procedure: OR  Start time: 4/28/2025 8:21 AM  Staffing  Performed by: Florentin Grijalva DO  Authorized by: Florentin Grijalva DO    Preanesthetic Checklist  Completed: patient identified, IV checked, site marked, risks and benefits discussed, surgical consent, monitors and equipment checked, pre-op evaluation and timeout performed  Spinal Block  Patient position: sitting  Prep: Betadine  Patient monitoring: heart rate, cardiac monitor, continuous pulse ox and frequent blood pressure checks  Approach: midline  Location: L3-4  Needle  Needle type: Pencan   Needle gauge: 24 G  Needle length: 3.5 in  Assessment  Sensory level: T4  Injection Assessment:  negative aspiration for heme, no paresthesia on injection and positive aspiration for clear CSF.  Post-procedure:  site cleaned

## 2025-04-28 NOTE — ASSESSMENT & PLAN NOTE
Right non-displaced distal fibula fracture after mechanical fall from missing a step 4/25/25.    Conservative management recommended at this time  Pain Control  NWB RLE in short leg splint  PT/OT  Ice and elevation  Follow up as outpt in 7-10 days.

## 2025-04-28 NOTE — OP NOTE
OPERATIVE REPORT  PATIENT NAME: Jacquie Gardner    :  1995  MRN: [id number]  Pt Location: [facility] L[facility]D OR ROOM 01    SURGERY DATE: 2025    Surgeons and Role:     * Obed Ross MD - Primary     * Lory Artis MD - Assisting     * Princess Blake MD - Assisting    Preop Diagnosis:  39 weeks of gestation  History of  delivery   Request for permanent sterilization   Asthma   Closed fracture of right ankle    Procedure(s) (LRB):   SECTION () REPEAT (N/A)  BILATERAL SALPINGECTOMY     Specimen(s):  ID Type Source Tests Collected by Time Destination   1 :  Tissue Fallopian Tube, Right TISSUE EXAM Obed Ross MD 2025 0854    2 :  Tissue Fallopian Tube, Left TISSUE EXAM Obed Ross MD 2025 0856    A :  Tissue (Placenta on Hold) OB Only Placenta PLACENTA IN STORAGE Obed Ross MD 2025 0843    B :  Cord Blood Cord BLOOD GAS, VENOUS, CORD Obed Ross MD 2025 0843    C :  Cord Blood Cord BLOOD GAS, ARTERIAL, CORD Obed Ross MD 2025 0843        Surgical QBL:  Surgical QBL (mL): 899 mL      Drains:  Urethral Catheter (Active)   Number of days: 0       Anesthesia Type:   Spinal    Operative Indications:  History of prior  section   39 weeks gestation of pregnancy   Request for permanent sterilization      Godwin [employer] Classification System:  No Multiple pregnancy, No Transverse or oblique lie, No Breech lie, Gestational age is > or =37 weeks, Multiparous, Previous uterine scar +  is GODWIN GROUP 5    Brief History  Jacquie Gardner is a 29 y.o.  at 39w2d admitted for scheduled RLTCS and bilateral salpingectomy.     Operative Findings:  1. Viable male  on 2025 at 8:42 AM with APGARs of 8  and 9  at 1 and 5 minutes. Fetus weighed 3130 g (6 lb 14.4 oz); 110.41   2. Clear amniotic fluid  3. Normal intact placenta with centrally inserted 3VC.  4. Normal uterus, bilateral tubes and ovaries  5. Adhesions  present between fascia, rectus muscles, peritoneum and omentum     Umbilical Cord Venous Blood Gas:  Results from last 7 days   Lab Units 04/28/25  0843   PH COV  7.373   PCO2 COV mm HG 39.4   HCO3 COV mmol/L 22.4   BASE EXC COV mmol/L -2.5*   O2 CT CD VB mL/dL 14.0   O2 HGB, VENOUS CORD % 66.2     Umbilical Cord Arterial Blood Gas:  Results from last 7 days   Lab Units 04/28/25  0843   PH COA  7.364   PCO2 COA  43.7   PO2 COA mm HG 24.3   HCO3 COA mmol/L 24.4   BASE EXC COA mmol/L -1.2*   O2 CONTENT CORD ART ml/dl 13.2   O2 HGB, ARTERIAL CORD % 61.5       Operative Technique  The patient was taken to the operating room. Spinal anesthesia was adequately established and Ancef 2 g was given for preoperative prophylaxis. The patient was then placed in the dorsal supine position with a left tilt of the hips. The patient was then prepped with chlorhexidine for vaginal prep and chloraprep for abdominal prep and draped in the usual sterile fashion for a Pfannenstiel skin incision.      A time out was performed to confirm correct patient and correct procedure.     A Pfannenstiel incision was made in the skin with the Bovie and dissection was carried out over subsequent layers of tissue including the fascia, followed by the Bovie electrocautery for hemostasis. The fascia was incised and the incision was extended bilaterally using the Bovie.    The superior edge of the fascial incision was grasped with Kocher clamps, tented up and the underlying rectus muscles were dissected off with the Bovie. The rectus muscles were then divided at midline and the peritoneum was identified, tented up at its upper margin taking care to avoid the bladder, and then entered. Adhesions were lysed using the Bovie. The peritoneal incision was extended superiorly and inferiorly.     The bladder blade was inserted and a transverse incision was made in the lower uterine segment using a new surgical blade. The uterine incision was extended cephalad  and caudal using blunt dissection. The amniotic sac was entered.    The surgeon's hand was placed into the uterine cavity. The fetal head was identified and elevated through the uterine incision with the assistance of fundal pressure. With gentle traction, the shoulder was delivered, followed by the rest of the fetal body. There was no nuchal cord noted. On delivery the cord was doubly clamped and cut after delayed cord clamping. The infant was then passed off the table to the awaiting  staff. The  was noted to cry spontaneously and moved all extremities. Venous and arterial blood gas, cord blood, and portion of cord was obtained for analysis and routine blood testing. The placenta delivery was then sent to storage. Placenta was noted to be intact with a centrally inserted three-vessel cord.  Oxytocin was administered by IV infusion to enhance uterine contraction. The uterus could not be exteriorized so the Ag retractor was placed. The uterus was cleared of all clots and remaining products of conception.      The uterine incision was re approximated using an 0 Vicryl in a running locked fashion. A second horizontal imbricating stitch with the same suture was applied. The uterine incision was examined and noted to be hemostatic.     The surgeon confirmed again with the patient that she desired permanent sterilization via bilateral salpingectomy.  Jacquie confirmed.  A separate timeout was held and the correct patient and procedure were confirmed.  The right fallopian tube was grasped and lifted away from the pelvic vasculature.  The LigaSure was used to ligate along the mesosalpinx and amputate the fallopian tube approximately 2 cm from the uterine cornua.  The dissection site was hemostatic.  The same was repeated on the left.  Both fallopian tubes were sent to pathology.    The pericolic gutters were cleared of all clots.  The uterine incision was once again reexamined and noted to be hemostatic.  The fascia was re approximated using an 0 Vicryl in a running nonlocked fashion. The subcutaneous tissue was irrigated and cleared of all clots and debris. Good hemostasis was noted with Bovie electrocautery. The subcutaneous tissue was reapproximated with chromic in an interrupted fashion. The skin incision was closed using 4-0 monocryl with exofin. Good hemostasis was noted. Patient tolerated the procedure well. All needle, sponge, and instrument counts were noted to be correct x 2 at the end of the procedure.  Patient was transferred to the recovery room in stable condition. Dr. Ross was present and participated in the entire surgery.     Complications:   None    Patient Disposition:  Postpartum room         SIGNATURE: Lory Artis MD  DATE: April 28, 2025  TIME: 9:37 AM

## 2025-04-28 NOTE — PLAN OF CARE
Problem: PAIN - ADULT  Goal: Verbalizes/displays adequate comfort level or baseline comfort level  Description: Interventions:- Encourage patient to monitor pain and request assistance- Assess pain using appropriate pain scale- Administer analgesics based on type and severity of pain and evaluate response- Implement non-pharmacological measures as appropriate and evaluate response- Consider cultural and social influences on pain and pain management- Notify physician/advanced practitioner if interventions unsuccessful or patient reports new pain  Outcome: Progressing     Problem: INFECTION - ADULT  Goal: Absence or prevention of progression during hospitalization  Description: INTERVENTIONS:- Assess and monitor for signs and symptoms of infection- Monitor lab/diagnostic results- Monitor all insertion sites, i.e. indwelling lines, tubes, and drains- Monitor endotracheal if appropriate and nasal secretions for changes in amount and color- [place] appropriate cooling/warming therapies per order- Administer medications as ordered- Instruct and encourage patient and family to use good hand hygiene technique- Identify and instruct in appropriate isolation precautions for identified infection/condition  Outcome: Progressing  Goal: Absence of fever/infection during neutropenic period  Description: INTERVENTIONS:- Monitor WBC  Outcome: Progressing     Problem: SAFETY ADULT  Goal: Patient will remain free of falls  Description: INTERVENTIONS:- Educate patient/family on patient safety including physical limitations- Instruct patient to call for assistance with activity - Consult OT/PT to assist with strengthening/mobility - Keep Call bell within reach- Keep bed low and locked with side rails adjusted as appropriate- Keep care items and personal belongings within reach- Initiate and maintain comfort rounds- Make Fall Risk Sign visible to staff- Offer Toileting every 2 Hours, in advance of need-  Apply yellow socks and  Wear protective goggles/shield to bed. bracelet for high fall risk patients- Consider moving patient to room near nurses station  Outcome: Progressing  Goal: Maintain or return to baseline ADL function  Description: INTERVENTIONS:-  Assess patient's ability to carry out ADLs; assess patient's baseline for ADL function and identify physical deficits which impact ability to perform ADLs (bathing, care of mouth/teeth, toileting, grooming, dressing, etc.)- Assess/evaluate cause of self-care deficits - Assess range of motion- Assess patient's mobility; develop plan if impaired- Assess patient's need for assistive devices and provide as appropriate- Encourage maximum independence but intervene and supervise when necessary- Involve family in performance of ADLs- Assess for home care needs following discharge - Consider OT consult to assist with ADL evaluation and planning for discharge- Provide patient education as appropriate  Outcome: Progressing  Goal: Maintains/Returns to pre admission functional level  Description: INTERVENTIONS:- Perform AM-PAC 6 Click Basic Mobility/ Daily Activity assessment daily.- Set and communicate daily mobility goal to care team and patient/family/caregiver. - Collaborate with rehabilitation services on mobility goals if consulted- Perform Range of Motio  Outcome: Progressing

## 2025-04-28 NOTE — ANESTHESIA PREPROCEDURE EVALUATION
Procedure:   SECTION () REPEAT (Uterus)  LIGATION/COAGULATION TUBAL (Abdomen)    Relevant Problems   GYN   (+) 38 weeks gestation of pregnancy      PULMONARY   (+) Asthma   (+) Asthma affecting pregnancy in third trimester        Physical Exam    Airway    Mallampati score: II         Dental       Cardiovascular  Rhythm: regular, Rate: normal    Pulmonary   Breath sounds clear to auscultation    Other Findings  post-pubertal.      Anesthesia Plan  ASA Score- 2     Anesthesia Type- spinal with ASA Monitors.         Additional Monitors:     Airway Plan:            Plan Factors-Exercise tolerance (METS): >4 METS.    Chart reviewed.   Existing labs reviewed. Patient summary reviewed.    Patient is not a current smoker.              Induction- intravenous.    Postoperative Plan-     Perioperative Resuscitation Plan - Level 1 - Full Code.       Informed Consent- Anesthetic plan and risks discussed with patient.        NPO Status:  No vitals data found for the desired time range.

## 2025-04-29 LAB
ERYTHROCYTE [DISTWIDTH] IN BLOOD BY AUTOMATED COUNT: 13.7 % (ref 11.6–15.1)
HCT VFR BLD AUTO: 28.5 % (ref 34.8–46.1)
HGB BLD-MCNC: 9.3 G/DL (ref 11.5–15.4)
MCH RBC QN AUTO: 28.8 PG (ref 26.8–34.3)
MCHC RBC AUTO-ENTMCNC: 32.6 G/DL (ref 31.4–37.4)
MCV RBC AUTO: 88 FL (ref 82–98)
PLATELET # BLD AUTO: 234 THOUSANDS/UL (ref 149–390)
PMV BLD AUTO: 9.6 FL (ref 8.9–12.7)
RBC # BLD AUTO: 3.23 MILLION/UL (ref 3.81–5.12)
WBC # BLD AUTO: 9.13 THOUSAND/UL (ref 4.31–10.16)

## 2025-04-29 PROCEDURE — 99024 POSTOP FOLLOW-UP VISIT: CPT | Performed by: OBSTETRICS & GYNECOLOGY

## 2025-04-29 PROCEDURE — 85027 COMPLETE CBC AUTOMATED: CPT

## 2025-04-29 RX ORDER — ASPIRIN 81 MG/1
81 TABLET, CHEWABLE ORAL 2 TIMES DAILY
Qty: 56 TABLET | Refills: 0 | Status: SHIPPED | OUTPATIENT
Start: 2025-04-29 | End: 2025-05-27

## 2025-04-29 RX ADMIN — ACETAMINOPHEN 650 MG: 325 TABLET, FILM COATED ORAL at 15:37

## 2025-04-29 RX ADMIN — OXYCODONE HYDROCHLORIDE 10 MG: 10 TABLET ORAL at 13:08

## 2025-04-29 RX ADMIN — OXYCODONE 5 MG: 5 TABLET ORAL at 08:35

## 2025-04-29 RX ADMIN — ALBUTEROL SULFATE 2 PUFF: 90 AEROSOL, METERED RESPIRATORY (INHALATION) at 16:40

## 2025-04-29 RX ADMIN — IBUPROFEN 600 MG: 600 TABLET ORAL at 18:12

## 2025-04-29 RX ADMIN — IBUPROFEN 600 MG: 600 TABLET ORAL at 11:13

## 2025-04-29 RX ADMIN — IRON SUCROSE 200 MG: 20 INJECTION, SOLUTION INTRAVENOUS at 06:37

## 2025-04-29 RX ADMIN — OXYCODONE HYDROCHLORIDE 10 MG: 10 TABLET ORAL at 19:22

## 2025-04-29 RX ADMIN — ACETAMINOPHEN 650 MG: 325 TABLET, FILM COATED ORAL at 04:49

## 2025-04-29 RX ADMIN — KETOROLAC TROMETHAMINE 30 MG: 30 INJECTION, SOLUTION INTRAMUSCULAR; INTRAVENOUS at 00:31

## 2025-04-29 RX ADMIN — SODIUM CHLORIDE, SODIUM LACTATE, POTASSIUM CHLORIDE, AND CALCIUM CHLORIDE 125 ML/HR: .6; .31; .03; .02 INJECTION, SOLUTION INTRAVENOUS at 00:40

## 2025-04-29 RX ADMIN — ACETAMINOPHEN 650 MG: 325 TABLET, FILM COATED ORAL at 10:03

## 2025-04-29 RX ADMIN — ACETAMINOPHEN 650 MG: 325 TABLET, FILM COATED ORAL at 22:03

## 2025-04-29 RX ADMIN — KETOROLAC TROMETHAMINE 15 MG: 30 INJECTION, SOLUTION INTRAMUSCULAR; INTRAVENOUS at 06:30

## 2025-04-29 RX ADMIN — IBUPROFEN 600 MG: 600 TABLET ORAL at 23:22

## 2025-04-29 RX ADMIN — ENOXAPARIN SODIUM 40 MG: 40 INJECTION SUBCUTANEOUS at 08:32

## 2025-04-29 NOTE — NURSING NOTE
Discharge education reviewed with patient. [facility] [employer] magnet reviewed and given. Pt verbalized understanding. No questions at this time.

## 2025-04-29 NOTE — PLAN OF CARE
Problem: PAIN - ADULT  Goal: Verbalizes/displays adequate comfort level or baseline comfort level  Description: Interventions:- Encourage patient to monitor pain and request assistance- Assess pain using appropriate pain scale- Administer analgesics based on type and severity of pain and evaluate response- Implement non-pharmacological measures as appropriate and evaluate response- Consider cultural and social influences on pain and pain management- Notify physician/advanced practitioner if interventions unsuccessful or patient reports new pain  Outcome: Progressing     Problem: INFECTION - ADULT  Goal: Absence or prevention of progression during hospitalization  Description: INTERVENTIONS:- Assess and monitor for signs and symptoms of infection- Monitor lab/diagnostic results- Monitor all insertion sites, i.e. indwelling lines, tubes, and drains- Monitor endotracheal if appropriate and nasal secretions for changes in amount and color- [place] appropriate cooling/warming therapies per order- Administer medications as ordered- Instruct and encourage patient and family to use good hand hygiene technique- Identify and instruct in appropriate isolation precautions for identified infection/condition  Outcome: Progressing  Goal: Absence of fever/infection during neutropenic period  Description: INTERVENTIONS:- Monitor WBC  Outcome: Progressing     Problem: SAFETY ADULT  Goal: Patient will remain free of falls  Description: INTERVENTIONS:- Educate patient/family on patient safety including physical limitations- Instruct patient to call for assistance with activity - Consult OT/PT to assist with strengthening/mobility - Keep Call bell within reach- Keep bed low and locked with side rails adjusted as appropriate- Keep care items and personal belongings within reach- Initiate and maintain comfort rounds- Make Fall Risk Sign visible to staff-   Outcome: Progressing  Goal: Maintain or return to baseline ADL  function  Description: INTERVENTIONS:-  Assess patient's ability to carry out ADLs; assess patient's baseline for ADL function and identify physical deficits which impact ability to perform ADLs (bathing, care of mouth/teeth, toileting, grooming, dressing, etc.)- Assess/evaluate cause of self-care deficits - Assess range of motion- Assess patient's mobility; develop plan if impaired- Assess patient's need for assistive devices and provide as appropriate- Encourage maximum independence but intervene and supervise when necessary- Involve family in performance of ADLs- Assess for home care needs following discharge - Consider OT consult to assist with ADL evaluation and planning for discharge- Provide patient education as appropriate  Outcome: Progressing  Goal: Maintains/Returns to pre admission functional level  Description: INTERVENTIONS:- Perform AM-PAC 6 Click Basic Mobility/ Daily Activity assessment daily.- Set and communicate daily mobility goal to care team and patient/family/caregiver. - Collaborate with rehabilitation services on mobility goals if consulted-  Out of bed for toileting- Record patient progress and toleration of activity level   Outcome: Progressing     Problem: Knowledge Deficit  Goal: Patient/family/caregiver demonstrates understanding of disease process, treatment plan, medications, and discharge instructions  Description: Complete learning assessment and assess knowledge base.Interventions:- Provide teaching at level of understanding- Provide teaching via preferred learning methods  Outcome: Progressing     Problem: DISCHARGE PLANNING  Goal: Discharge to home or other facility with appropriate resources  Description: INTERVENTIONS:- Identify barriers to discharge w/patient and caregiver- Arrange for needed discharge resources and transportation as appropriate- Identify discharge learning needs (meds, wound care, etc.)- Arrange for interpretive services to assist at discharge as needed-  Refer to Case Management Department for coordinating discharge planning if the patient needs post-hospital services based on physician/advanced practitioner order or complex needs related to functional status, cognitive ability, or social support system  Outcome: Progressing     Problem: POSTPARTUM  Goal: Experiences normal postpartum course  Description: INTERVENTIONS:- Monitor maternal vital signs- Assess uterine involution and lochia  Outcome: Progressing  Goal: Appropriate maternal -  bonding  Description: INTERVENTIONS:- Identify family support- Assess for appropriate maternal/infant bonding -Encourage maternal/infant bonding opportunities- Referral to  or  as needed  Outcome: Progressing  Goal: Establishment of infant feeding pattern  Description: INTERVENTIONS:- Assess breast/bottle feeding- Refer to lactation as needed  Outcome: Progressing  Goal: Incision(s), wounds(s) or drain site(s) healing without S/S of infection  Description: INTERVENTIONS- Assess and document dressing, incision, wound bed, drain sites and surrounding tissue- POutcome: Progressing

## 2025-04-29 NOTE — PROGRESS NOTES
Progress Note - OB/GYN   Name: Jacquie Gardner 29 y.o. female I MRN: [id number]  Unit/Bed#: -01 I Date of Admission: 2025   Date of Service: 2025 I Hospital Day: 1       Assessment and Plan   Jacquie Gardner is a patient of: [facility]. She is POD#1 s/p repeat  section, low transverse incision, bilateral salpingectomy. Recovering well and stable.    Disposition   - Anticipate discharge home on POD#2-4  Assessment & Plan  Status post repeat low transverse  section  QBL: 899cc; admission Hb 13.2g/dL -> 9.3g/dL, Venofer ordered. Patient progressing toward postoperative milestones  - Continue routine postoperative care  - Pain management with oral analgesics; s/p oxycodone 5mg x1 over last 12hrs  - DVT Ppx: Lovenox 40mg qd; encourage ambulation  - F/u void trial  - Encourage breastfeeding, familial bonding  - Contraception: s/p bilateral salpingectomy at time of   Closed fracture of distal end of right fibula with routine healing  S/p ortho consult; recommend conservative management and outpatient follow up  Asthma  - albuterol PRN    OB Post-Partum Progress Note  Chief Concern: POD#1 s/p repeat  section, low transverse incision, bilateral salpingectomy  Subjective:    Pain is moderately controlled. She is not currently voiding. She is currently passing flatus. She is not ambulating. She is tolerating PO and denies nausea or vomitting. She denies chest pain, shortness of breath, lightheadedness. Lochia is normal.    Objective :  Temp:  [96.6 °F (35.9 °C)-98.9 °F (37.2 °C)] 98.6 °F (37 °C)  HR:  [] 84  BP: ()/(51-72) 106/61  Resp:  [14-20] 20  SpO2:  [97 %-100 %] 98 %  O2 Device: None (Room air)    Physical Exam  GEN: well-appearing, alert and oriented x3  CARDIO: regular rate  RESP: nonlabored respirations on room air  ABDOMEN: soft, nontender, nodistended, fundus firm below the umbilicus, incision clean/dry/intact  LOWER EXTREMITIES:  SCD in place on left lower extremity; splint at right foot    Labs:   Hemoglobin   Date Value Ref Range Status   04/29/2025 9.3 (L) 11.5 - 15.4 g/dL Final   04/28/2025 13.2 11.5 - 15.4 g/dL Final     WBC   Date Value Ref Range Status   04/29/2025 9.13 4.31 - 10.16 Thousand/uL Final   04/28/2025 7.52 4.31 - 10.16 Thousand/uL Final     Platelets   Date Value Ref Range Status   04/29/2025 234 149 - 390 Thousands/uL Final   04/28/2025 299 149 - 390 Thousands/uL Final     Creatinine   Date Value Ref Range Status   09/20/2019 0.50 (L) 0.60 - 1.30 mg/dL Final     Comment:     Standardized to IDMS reference method   09/12/2018 0.64 0.60 - 1.30 mg/dL Final     Comment:     Standardized to IDMS reference method     AST   Date Value Ref Range Status   09/20/2019 19 5 - 45 U/L Final     Comment:       Specimen collection should occur prior to Sulfasalazine administration due to the potential for falsely depressed results.    09/12/2018 27 5 - 45 U/L Final     Comment:     Slightly Hemolyzed; Results May be Affected  Specimen collection should occur prior to Sulfasalazine administration due to the potential for falsely depressed results.      ALT   Date Value Ref Range Status   09/20/2019 23 12 - 78 U/L Final     Comment:       Specimen collection should occur prior to Sulfasalazine administration due to the potential for falsely depressed results.    09/12/2018 23 12 - 78 U/L Final     Comment:       Specimen collection should occur prior to Sulfasalazine administration due to the potential for falsely depressed results.           Naresh Hunter MD  OBGYN PGY-1  04/29/25  6:16 AM

## 2025-04-29 NOTE — PLAN OF CARE
Problem: PAIN - ADULT  Goal: Verbalizes/displays adequate comfort level or baseline comfort level  Description: Interventions:- Encourage patient to monitor pain and request assistance- Assess pain using appropriate pain scale- Administer analgesics based on type and severity of pain and evaluate response- Implement non-pharmacological measures as appropriate and evaluate response- Consider cultural and social influences on pain and pain management- Notify physician/advanced practitioner if interventions unsuccessful or patient reports new pain  Outcome: Progressing     Problem: INFECTION - ADULT  Goal: Absence or prevention of progression during hospitalization  Description: INTERVENTIONS:- Assess and monitor for signs and symptoms of infection- Monitor lab/diagnostic results- Monitor all insertion sites, i.e. indwelling lines, tubes, and drains- Monitor endotracheal if appropriate and nasal secretions for changes in amount and color- [place] appropriate cooling/warming therapies per order- Administer medications as ordered- Instruct and encourage patient and family to use good hand hygiene technique- Identify and instruct in appropriate isolation precautions for identified infection/condition  Outcome: Progressing  Goal: Absence of fever/infection during neutropenic period  Description: INTERVENTIONS:- Monitor WBC  Outcome: Progressing     Problem: SAFETY ADULT  Goal: Patient will remain free of falls  Description: INTERVENTIONS:- Educate patient/family on patient safety including physical limitations- Instruct patient to call for assistance with activity - Consult OT/PT to assist with strengthening/mobility - Keep Call bell within reach- Keep bed low and locked with side rails adjusted as appropriate- Keep care items and personal belongings within reach- Initiate and maintain comfort rounds- Make Fall Risk Sign visible to staff- Offer Toileting every  Hours, in advance of need- Initiate/Maintain alarm- Obtain  necessary fall risk management equipment: - Apply yellow socks and bracelet for high fall risk patients- Consider moving patient to room near nurses station  Outcome: Progressing  Goal: Maintain or return to baseline ADL function  Description: INTERVENTIONS:-  Assess patient's ability to carry out ADLs; assess patient's baseline for ADL function and identify physical deficits which impact ability to perform ADLs (bathing, care of mouth/teeth, toileting, grooming, dressing, etc.)- Assess/evaluate cause of self-care deficits - Assess range of motion- Assess patient's mobility; develop plan if impaired- Assess patient's need for assistive devices and provide as appropriate- Encourage maximum independence but intervene and supervise when necessary- Involve family in performance of ADLs- Assess for home care needs following discharge - Consider OT consult to assist with ADL evaluation and planning for discharge- Provide patient education as appropriate  Outcome: Progressing  Goal: Maintains/Returns to pre admission functional level  Description: INTERVENTIONS:- Perform AM-PAC 6 Click Basic Mobility/ Daily Activity assessment daily.- Set and communicate daily mobility goal to care team and patient/family/caregiver. - Collaborate with rehabilitation services on mobility goals if consulted- Perform Range of Motion  times a day.- Reposition patient every  hours.- Dangle patient  times a day- Stand patient  times a day- Ambulate patient  times a day- Out of bed to chair  times a day - Out of bed for meals  times a day- Out of bed for toileting- Record patient progress and toleration of activity level   Outcome: Progressing     Problem: Knowledge Deficit  Goal: Patient/family/caregiver demonstrates understanding of disease process, treatment plan, medications, and discharge instructions  Description: Complete learning assessment and assess knowledge base.Interventions:- Provide teaching at level of understanding- Provide  teaching via preferred learning methods  Outcome: Progressing     Problem: DISCHARGE PLANNING  Goal: Discharge to home or other facility with appropriate resources  Description: INTERVENTIONS:- Identify barriers to discharge w/patient and caregiver- Arrange for needed discharge resources and transportation as appropriate- Identify discharge learning needs (meds, wound care, etc.)- Arrange for interpretive services to assist at discharge as needed- Refer to Case Management Department for coordinating discharge planning if the patient needs post-hospital services based on physician/advanced practitioner order or complex needs related to functional status, cognitive ability, or social support system  Outcome: Progressing     Problem: POSTPARTUM  Goal: Experiences normal postpartum course  Description: INTERVENTIONS:- Monitor maternal vital signs- Assess uterine involution and lochia  Outcome: Progressing  Goal: Appropriate maternal -  bonding  Description: INTERVENTIONS:- Identify family support- Assess for appropriate maternal/infant bonding -Encourage maternal/infant bonding opportunities- Referral to  or  as needed  Outcome: Progressing  Goal: Establishment of infant feeding pattern  Description: INTERVENTIONS:- Assess breast/bottle feeding- Refer to lactation as needed  Outcome: Progressing  Goal: Incision(s), wounds(s) or drain site(s) healing without S/S of infection  Description: INTERVENTIONS- Assess and document dressing, incision, wound bed, drain sites and surrounding tissue- Provide patient and family education- Perform skin care/dressing changes every   Outcome: Progressing

## 2025-04-29 NOTE — LACTATION NOTE
This note was copied from a baby's chart.  CONSULT - LACTATION  Baby Boy (Chris Gardner 1 days male MRN: [id number]    [facility] NURSERY Room / Bed: (N)/(N) Encounter: [id number]    Maternal Information     MOTHER:  Jacquie Patel  Maternal Age: 29 y.o.  OB History: # 1 - Date: 20, Sex: Female, Weight: 3380 g (7 lb 7.2 oz), GA: 40w1d, Type: , Low Transverse, Apgar1: 9, Apgar5: 9, Living: Living, Birth Comments: Dr. Brandon present for delivery in OR    # 2 - Date: 25, Sex: Male, Weight: 3130 g (6 lb 14.4 oz), GA: 39w2d, Type: , Low Transverse, Apgar1: 8, Apgar5: 9, Living: Living, Birth Comments: None   Previous breast reduction surgery? No    Lactation history:   Has patient previously breast fed: Yes   How long had patient previously breast fed: about 2 months   Previous breast feeding complications: Other (Comment) (Mixed feeding plan)     Past Surgical History:   Procedure Laterality Date    [place]  DELIVERY ONLY N/A 2020    Procedure:  SECTION ();  Surgeon: Almaz Guzman DO;  Location: [facility];  Service: Obstetrics    [place]  DELIVERY ONLY N/A 2025    Procedure:  SECTION () REPEAT;  Surgeon: Obed Ross MD;  Location: [facility];  Service: Obstetrics    TUBAL LIGATION Bilateral 2025    Procedure: LIGATION/COAGULATION TUBAL;  Surgeon: Obed Ross MD;  Location: [facility];  Service: Obstetrics       Birth information:  YOB: 2023   Time of birth: 8:42 AM   Sex: male   Delivery type: , Low Transverse   Birth Weight: 3130 g (6 lb 14.4 oz)   Percent of Weight Change: -2%     Gestational Age: 39w2d   [unfilled]    Reason for Consult:    Reason for Consult: Initial assessment (routine) - 10 min, Latch Assess (routine) - 10 min, Discharge (routine) - 10 min    Risk Factors:         Breast and nipple assessment:   Breasts/Nipples  Left Breast:  Soft  Right Breast: Soft  Left Nipple: Everted  Right Nipple: Everted  Intervention: Other (comment), Hand expression (Review good latch/feed & support available)  Breastfeeding Progress: Not yet established    OB Lactation Tools:         Breast Pump:    Breast Pump  Pump: Personal (has Spectra S9 from Insurance)  Pump Review/Education: Milk storage      [place] Assessment: normal assessment    Feeding assessment: feeding well with guidance     LATCH:  Latch: Grasps breast, tongue down, lips flanged, rhythmic sucking   Audible Swallowing: A few with stimulation   Type of Nipple: Everted (After stimulation)   Comfort (Breast/Nipple): Soft/non-tender   Hold (Positioning): Partial assist, teach one side, mother does other, staff holds   LATCH Score: 8       [place]:                   Feeding recommendations:  breast feed on demand       25 0900   Patient Follow-Up   Lactation Consult Status 2   Follow-Up Type Inpatient;Call as needed   Other OB Lactation Documentation    Additional Problem Noted Initial Visit - Family with some experience of mixed feeds; helped with positioning/maintaining deep latch with active suckling  (Ready, Set Baby & Discharge Booklets @ bedside)       Parents are requesting assistance with feeding baby at breast.    Education on positioning and alignment. Mom is encouraged to:     - Bring baby up to the breast (use of pillows to elevate so baby's torso is against mom's breasts)   - Skin to skin for feedings with top hand exposed to show signs of satiation   - Chin deep into breast tissue (make baby look up to the nipple)   - nose aligned to the nipple   -Wait for wide gape, place chin behind the areola on the breast so nipple is at the nose. Once baby opens their mouth wide, the nipple will be aimed at the upper, back palate  - Cheeks should be touching breast, and baby should have a long neck   - Ear, shoulder, hip will be in alignment   - Deep, snug hold of baby up to the breast   -  Every baby knows how to breathe at the breast. The tip of the nose may appear to touch the breast. Babies breathe from the side of the nasal passages. If baby can not breathe due to improper alignment, baby will unlatch    Mom chose to hand express and start on right. Worked on positioning infant up at chest level and starting to feed infant with nose arriving at the nipple. Then, using areolar compression to achieve a deep latch that is comfortable and exchanges optimum amounts of milk.  Guided Quickly to deep latch. Used breast compressions & stimulation to keep rocker suckling till asleep at breast with relaxed tone. Nursing parent  was able to note signs of a good feeding like: less discomfort after latch on tenderness, good rocker suckling with stimulation, breast softening; rounded nipple and relaxed tone after nursing at breast.  Father also shown signs of good latch /feed.  Parents seem pleased with session.     Also Ready, Set Baby &  discharge breastfeeding booklets @ bedside  including the feeding log. Emphasized 8 or more (12) feedings in a 24 hour period, what to expect for the number of diapers per day of life and the progression of properties of the  stooling pattern.    List of reasons to call a lactation consultant.  Feeding logs  Feeding cues  Hand expression  Baby's Second day (cluster feeding)  Breastfeeding and Your Lifestyle (Medications, Alcohol, Caffeine, Smoking, Street Drugs, Methadone)  First Two Weeks Survival Guide for Breastfeeding  Breast Changes  Physical Therapy  Storage and Handling of Breast milk  How to Keep Your Breast Pump Kit Clean  The Employed Breastfeeding Mother  Mixed feeding  Bottle feeding like breastfeeding (paced bottle feeding)  astfeeding and your lifestyle, storage and preparation of breast milk, how to keep you breast pump clean, the employed breastfeeding mother and paced bottle feeding handouts.     Booklet included Breastfeeding Resources for after  discharge including access to the number for the [facility].    Encouraged parents to call for assistance, questions, and concerns about breastfeeding.      Karine Mancuso RN 4/29/2025 9:32 AM

## 2025-04-29 NOTE — PLAN OF CARE
Problem: PAIN - ADULT  Goal: Verbalizes/displays adequate comfort level or baseline comfort level  Description: Interventions:- Encourage patient to monitor pain and request assistance- Assess pain using appropriate pain scale- Administer analgesics based on type and severity of pain and evaluate response- Implement non-pharmacological measures as appropriate and evaluate response- Consider cultural and social influences on pain and pain management- Notify physician/advanced practitioner if interventions unsuccessful or patient reports new pain  Outcome: Progressing     Problem: INFECTION - ADULT  Goal: Absence or prevention of progression during hospitalization  Description: INTERVENTIONS:- Assess and monitor for signs and symptoms of infection- Monitor lab/diagnostic results- Monitor all insertion sites, i.e. indwelling lines, tubes, and drains- Monitor endotracheal if appropriate and nasal secretions for changes in amount and color- [place] appropriate cooling/warming therapies per order- Administer medications as ordered- Instruct and encourage patient and family to use good hand hygiene technique- Identify and instruct in appropriate isolation precautions for identified infection/condition  Outcome: Progressing  Goal: Absence of fever/infection during neutropenic period  Description: INTERVENTIONS:- Monitor WBC  Outcome: Progressing     Problem: SAFETY ADULT  Goal: Patient will remain free of falls  Description: INTERVENTIONS:- Educate patient/family on patient safety including physical limitations- Instruct patient to call for assistance with activity - Consult OT/PT to assist with strengthening/mobility - Keep Call bell within reach- Keep bed low and locked with side rails adjusted as appropriate- Keep care items and personal belongings within reach- Initiate and maintain comfort rounds- Make Fall Risk Sign visible to staff- Offer Toileting every  Hours, in advance of need- IOutcome: Progressing  Goal:  Maintain or return to baseline ADL function  Description: INTERVENTIONS:-  Assess patient's ability to carry out ADLs; assess patient's baseline for ADL function and identify physical deficits which impact ability to perform ADLs (bathing, care of mouth/teeth, toileting, grooming, dressing, etc.)- Assess/evaluate cause of self-care deficits - Assess range of motion- Assess patient's mobility; develop plan if impaired- Assess patient's need for assistive devices and provide as appropriate- Encourage maximum independence but intervene and supervise when necessary- Involve family in performance of ADLs- Assess for home care needs following discharge - Consider OT consult to assist with ADL evaluation and planning for discharge- Provide patient education as appropriate  Outcome: Progressing  Goal: Maintains/Returns to pre admission functional level  Description: INTERVENTIONS:- Perform AM-PAC 6 Click Basic Mobility/ Daily Activity assessment daily.- Set and communicate daily mobility goal to care team and patient/family/caregiver. - Collaborate with rehabilitation services on mobility goals if consulted-Outcome: Progressing     Problem: Knowledge Deficit  Goal: Patient/family/caregiver demonstrates understanding of disease process, treatment plan, medications, and discharge instructions  Description: Complete learning assessment and assess knowledge base.Interventions:- Provide teaching at level of understanding- Provide teaching via preferred learning methods  Outcome: Progressing     Problem: DISCHARGE PLANNING  Goal: Discharge to home or other facility with appropriate resources  Description: INTERVENTIONS:- Identify barriers to discharge w/patient and caregiver- Arrange for needed discharge resources and transportation as appropriate- Identify discharge learning needs (meds, wound care, etc.)- Arrange for interpretive services to assist at discharge as needed- Refer to Case Management Department for coordinating  discharge planning if the patient needs post-hospital services based on physician/advanced practitioner order or complex needs related to functional status, cognitive ability, or social support system  Outcome: Progressing     Problem: POSTPARTUM  Goal: Experiences normal postpartum course  Description: INTERVENTIONS:- Monitor maternal vital signs- Assess uterine involution and lochia  Outcome: Progressing  Goal: Appropriate maternal -  bonding  Description: INTERVENTIONS:- Identify family support- Assess for appropriate maternal/infant bonding -Encourage maternal/infant bonding opportunities- Referral to  or  as needed  Outcome: Progressing  Goal: Establishment of infant feeding pattern  Description: INTERVENTIONS:- Assess breast/bottle feeding- Refer to lactation as needed  Outcome: Progressing  Goal: Incision(s), wounds(s) or drain site(s) healing without S/S of infection  Description: INTERVENTIONS- Assess and document dressing, incision, wound bed, drain sites and surrounding tissue- Provide patient and family education- Outcome: Progressing

## 2025-04-29 NOTE — ASSESSMENT & PLAN NOTE
QBL: 899cc; admission Hb 13.2g/dL -> 9.3g/dL, Venofer ordered. Patient progressing toward postoperative milestones  - Continue routine postoperative care  - Pain management with oral analgesics; s/p oxycodone 5mg x1 over last 12hrs  - DVT Ppx: Lovenox 40mg qd; encourage ambulation  - F/u void trial  - Encourage breastfeeding, familial bonding  - Contraception: s/p bilateral salpingectomy at time of

## 2025-04-30 LAB
DME PARACHUTE DELIVERY DATE REQUESTED: NORMAL
DME PARACHUTE ITEM DESCRIPTION: NORMAL
DME PARACHUTE ORDER STATUS: NORMAL
DME PARACHUTE SUPPLIER NAME: NORMAL
DME PARACHUTE SUPPLIER PHONE: NORMAL

## 2025-04-30 PROCEDURE — 97760 ORTHOTIC MGMT&TRAING 1ST ENC: CPT

## 2025-04-30 PROCEDURE — 97163 PT EVAL HIGH COMPLEX 45 MIN: CPT

## 2025-04-30 PROCEDURE — 99024 POSTOP FOLLOW-UP VISIT: CPT

## 2025-04-30 RX ORDER — DOCUSATE SODIUM 100 MG/1
100 CAPSULE, LIQUID FILLED ORAL 2 TIMES DAILY
Status: DISCONTINUED | OUTPATIENT
Start: 2025-04-30 | End: 2025-05-01 | Stop reason: HOSPADM

## 2025-04-30 RX ADMIN — DOCUSATE SODIUM 100 MG: 100 CAPSULE, LIQUID FILLED ORAL at 18:20

## 2025-04-30 RX ADMIN — ACETAMINOPHEN 650 MG: 325 TABLET, FILM COATED ORAL at 04:18

## 2025-04-30 RX ADMIN — ACETAMINOPHEN 650 MG: 325 TABLET, FILM COATED ORAL at 10:06

## 2025-04-30 RX ADMIN — OXYCODONE HYDROCHLORIDE 10 MG: 10 TABLET ORAL at 01:05

## 2025-04-30 RX ADMIN — IBUPROFEN 600 MG: 600 TABLET ORAL at 18:21

## 2025-04-30 RX ADMIN — ALBUTEROL SULFATE 2 PUFF: 90 AEROSOL, METERED RESPIRATORY (INHALATION) at 21:35

## 2025-04-30 RX ADMIN — DOCUSATE SODIUM 100 MG: 100 CAPSULE, LIQUID FILLED ORAL at 12:36

## 2025-04-30 RX ADMIN — OXYCODONE HYDROCHLORIDE 10 MG: 10 TABLET ORAL at 07:51

## 2025-04-30 RX ADMIN — OXYCODONE HYDROCHLORIDE 10 MG: 10 TABLET ORAL at 15:27

## 2025-04-30 RX ADMIN — ACETAMINOPHEN 650 MG: 325 TABLET, FILM COATED ORAL at 16:24

## 2025-04-30 RX ADMIN — IBUPROFEN 600 MG: 600 TABLET ORAL at 12:15

## 2025-04-30 RX ADMIN — ACETAMINOPHEN 650 MG: 325 TABLET, FILM COATED ORAL at 21:36

## 2025-04-30 RX ADMIN — IBUPROFEN 600 MG: 600 TABLET ORAL at 06:00

## 2025-04-30 RX ADMIN — ENOXAPARIN SODIUM 40 MG: 40 INJECTION SUBCUTANEOUS at 10:06

## 2025-04-30 NOTE — RESTORATIVE TECHNICIAN NOTE
Restorative Technician Note      Patient Name: Jacquie Gardner     Restorative Tech Visit Date: 04/30/25  Note Type: Bracing, Follow-up fitting  Patient Position Upon Consult: Supine  Activity Performed: Repositioned  Brace Applied: Cam Walker Boot Standard  Additional Brace Ordered: No  Patient Position When Brace Applied: Supine  Bracing Recommendations: None  Education Provided: Yes  Patient Position at End of Consult: All needs within reach; Supine  Nurse Communication: Nurse aware of consult, application of brace

## 2025-04-30 NOTE — LACTATION NOTE
This note was copied from a baby's chart.  CONSULT - LACTATION  Baby Boy (Chris Gardner 2 days male MRN: [id number]    [facility] NURSERY Room / Bed: (N)/(N) Encounter: [id number]    Maternal Information     MOTHER:  Jacquie Patel  Maternal Age: 29 y.o.  OB History: # 1 - Date: 20, Sex: Female, Weight: 3380 g (7 lb 7.2 oz), GA: 40w1d, Type: , Low Transverse, Apgar1: 9, Apgar5: 9, Living: Living, Birth Comments: Dr. Brandon present for delivery in OR    # 2 - Date: 25, Sex: Male, Weight: 3130 g (6 lb 14.4 oz), GA: 39w2d, Type: , Low Transverse, Apgar1: 8, Apgar5: 9, Living: Living, Birth Comments: None   Previous breast reduction surgery? No    Lactation history:   Has patient previously breast fed: Yes   How long had patient previously breast fed: about 2 months   Previous breast feeding complications: Other (Comment) (Mixed feeding plan)     Past Surgical History:   Procedure Laterality Date    [place]  DELIVERY ONLY N/A 2020    Procedure:  SECTION ();  Surgeon: Almaz Guzman DO;  Location: [facility];  Service: Obstetrics    [place]  DELIVERY ONLY N/A 2025    Procedure:  SECTION () REPEAT;  Surgeon: Obed Ross MD;  Location: [facility];  Service: Obstetrics    TUBAL LIGATION Bilateral 2025    Procedure: LIGATION/COAGULATION TUBAL;  Surgeon: Obed Ross MD;  Location: [facility];  Service: Obstetrics       Birth information:  YOB: 2023   Time of birth: 8:42 AM   Sex: male   Delivery type: , Low Transverse   Birth Weight: 3130 g (6 lb 14.4 oz)   Percent of Weight Change: -5%     Gestational Age: 39w2d   [unfilled]    Reason for Consult:    Reason for Consult: Follow-up assessment (routine) -10 min, Latch Assess (ext) - 20 min, Breast/Nipple Pain - 5 min    Risk Factors:         Breast and nipple assessment:   Breasts/Nipples  Left Breast:  Filling  Right Breast: Filling  Left Nipple: Everted, Tender  Right Nipple: Everted, Tender  Intervention: Other (comment), Hand expression (helped with positioning maintaining deep latch)  Breastfeeding Progress: Not yet established    OB Lactation Tools:    Other OB Lactation Tools  Feeding Devices: Lanolin    Breast Pump:    Breast Pump  Pump: Personal  Pump Review/Education: Milk storage      [place] Assessment: sleepy    Feeding assessment: latch difficulty (occasional loud swallowing noted; no regular click; slight compression after some feeds )    LATCH:  Latch: Grasps breast, tongue down, lips flanged, rhythmic sucking   Audible Swallowing: A few with stimulation   Type of Nipple: Everted (After stimulation)   Comfort (Breast/Nipple): Filling, red/small blisters/bruises, mild/moderate discomfort   Hold (Positioning): Partial assist, teach one side, mother does other, staff holds   LATCH Score: 7       FernandaelBaker:                   Feeding recommendations:  breast feed on demand       25 1300   Patient Follow-Up   Lactation Consult Status 2   Follow-Up Type Inpatient;Call as needed   Other OB Lactation Documentation    Additional Problem Noted Follow up Visit - Mom asked for latch assessment due to sore nipples  (Sore nipples, checklist essentials of positioning & latch, Congratulations on Your Baby !)     Follow up to see family this morning. Mom C/O sore nipples.  Information given about sore nipples and how to correct with positioning techniques. Discussed maneuvers to latch infant on properly to avoid nipple pain and promote healing.  Discussed treatments that could be utilized to promote healing. Hydro gel dressings given with instruction and verbalization of understanding of cleaning and duration of use.     Verbal review of positioning and alignment. Mom is encouraged to:     - Bring baby up to the breast (use of pillows to elevate so baby's torso is against mom's breasts)   - Skin to skin for  feedings with top hand exposed to show signs of satiation   - Chin deep into breast tissue (make baby look up to the nipple)   - nose aligned to the nipple   -Wait for wide gape, place chin behind the areola on the breast so nipple is at the nose. Once baby opens their mouth wide, the nipple will be aimed at the upper, back palate  - Cheeks should be touching breast, and baby should have a long neck   - Ear, shoulder, hip will be in alignment   - Deep, snug hold of baby up to the breast   - Every baby knows how to breathe at the breast. The tip of the nose may appear to touch the breast. Babies breathe from the side of the nasal passages. If baby can not breathe due to improper alignment, baby will unlatch    Called back in at this time for assistance with positioning/maintaining deep latch. Mom chose to start on left breast using football hold; Mom wanted me to guide the latch due to soreness from her nursing baby overnight.  Worked on positioning infant up at chest level and starting to feed infant with nose arriving at the nipple. Then, using areolar compression to achieve a deep latch that is comfortable and exchanges optimum amounts of milk.  Guided dyad to deeper latch; mom notes less discomfort and audible swallowing, quite loud at time, mild nipple compression when taken off. Baby burped. Then to right breast also using football hold till asleep at breast with relaxed tone. Nipple rounded after feed with some loud swallowing. Nursing parent  was able to note signs of a good feeding like: less discomfort after latch on tenderness, good rocker suckling with stimulation, breast softening; rounded nipple and relaxed tone after nursing at breast.  Mom is pleased with session.     Encoraged nursing parent to call for assistance, questions and concerns.  Extension number for inpatient lactation support provided.       Karine Mancuso RN 4/30/2025 2:56 PM

## 2025-04-30 NOTE — PHYSICAL THERAPY NOTE
PT EVALUATION & ORTHOTIC Management/Training    Pt. Name: Jacquie Gardner  Pt. Age: 29 y.o.  MRN: [id number]  LENGTH OF STAY: 2      Admitting Diagnoses:   Encounter for full-term uncomplicated delivery [O80]    Past Medical History:   Diagnosis Date    Asthma     Albuterol prn    Varicella     as child and immunized       Past Surgical History:   Procedure Laterality Date    [place]  DELIVERY ONLY N/A 2020    Procedure:  SECTION ();  Surgeon: Almaz Guzman DO;  Location: [facility] ;  Service: Obstetrics    [place]  DELIVERY ONLY N/A 2025    Procedure:  SECTION () REPEAT;  Surgeon: Obed Ross MD;  Location: [facility];  Service: Obstetrics    TUBAL LIGATION Bilateral 2025    Procedure: LIGATION/COAGULATION TUBAL;  Surgeon: Obed Ross MD;  Location: [facility] ;  Service: Obstetrics       Imaging Studies:  No orders to display        25 0834   PT Last Visit   PT Visit Date 25   Note Type   Note type Orthotic Management/Training   Type of Brace   Brace Applied Cam Walker Boot Standard  (RLE)   Patient Position When Brace Applied Supine   Bracing Recommendations Recommendation (comment)  (Per Ortho, WBAT in camboot.)   Education   Education Provided Yes   End of Consult   Patient Position at End of Consult Supine   Nurse Communication Nurse aware of consult, application of brace   Pain Assessment   Pain Score 8   Pain Location/Orientation Orientation: Right;Location: Leg   Hospital Pain Intervention(s) Medication (See MAR);Repositioned;Ambulation/increased activity;Emotional support;Rest   Restrictions/Precautions   Weight Bearing Precautions Per Order Yes   RLE Weight Bearing Per Order WBAT  (in camboot)   Home Living   Type of Home House   Home Layout Two level;Stairs to enter with rails;Other (Comment);1/2 bath on main level;Bed/bath upstairs  (5STE; FOS to 2nd floor bed/bath)   Prior Function   Level of [place] Independent with  functional mobility;Independent with ADLs;Independent with IADLS  (w/o AD)   Lives With Spouse   Receives Help From Family   Falls in the last 6 months 1 to 4  (1x)   Comments (+)    General   Additional Pertinent History pt s/p  on .   Family/Caregiver Present Yes  ()   Cognition   Overall Cognitive Status WFL   Arousal/Participation Alert   Following Commands Follows all commands and directions without difficulty   Subjective   Subjective Pt agreeable to camboot application & PT eval.   RUE Assessment   RUE Assessment WFL   LUE Assessment   LUE Assessment WFL   RLE Assessment   RLE Assessment X  (R ankle not tested)   LLE Assessment   LLE Assessment WFL   Coordination   Movements are Fluid and Coordinated 1   Sensation WFL   Bed Mobility   Supine to Sit 5  Supervision   Additional items HOB elevated;Bedrails;Increased time required;Verbal cues   Additional Comments cues for techniques & safety   Transfers   Sit to Stand 5  Supervision   Additional items Increased time required;Verbal cues   Stand to Sit 5  Supervision   Additional items Increased time required;Verbal cues   Additional Comments w/ RW; cues for techniques & safety   Ambulation/Elevation   Gait pattern Antalgic;Wide SUE;Decreased foot clearance;Decreased R stance;Step to;Excessively slow   Gait Assistance 5  Supervision   Additional items Verbal cues   Assistive Device Rolling walker   Distance 15'x2   Stair Management Assistance   (verbal instructions given)   Ambulation/Elevation Additional Comments no gross LOB noted; dec amb tolerance 2* to R leg pain &  incisional pain   Balance   Static Sitting Normal   Dynamic Sitting Good   Static Standing Fair +  (w/ RW)   Dynamic Standing Fair  (w/ RW)   Ambulatory Fair -  (w/ RW)   Endurance Deficit   Endurance Deficit Yes   Endurance Deficit Description pain   Activity Tolerance   Activity Tolerance Patient limited by pain;Treatment limited secondary to medical  complications (Comment)   Nurse Made Aware yes   Assessment   Prognosis Good   Problem List Pain;Orthopedic restrictions   Assessment Pt 29 y.o. female admitted for Status post repeat low transverse  section w/ Encounter for full-term uncomplicated delivery (O80). Pt w/ recent h/o R distal fibular fx on  2* to a fall. Per Ortho, WBAT in camboot RLE. Pt referred to PT for mobility assessment & D/C planning. Please see flowsheet above re: home set-up, PLOF & objective findings during PT assessment. PTA, pt reports being I w/o AD however require assistance since R distal fibular fx on  given she was initially NWB per Urgent Care. Of note, Camb[place] application completed prior to mobility assessment. Assisted Restorative Specialist for camboot application as pt has a posterior splint that was applied at Urgent Care. Posterior splint removed. Camb[place] applied w/ good fitting achieved. Pt &  instructed on camboot management w/ good understanding. Pt was breastfeeding during camboot fitting hence OOB mobility deferred at that time. Camboot application completed at 08:34 am.  OOB mobility assessment completed from 09:17-09:37 am. On eval, pt  require S for most functional mobility including amb w/ RW  to warrant skilled PT at this time. Pt tolerated camboot & amb w/ RW well. No gross LOB noted. Pt require to inflate air bladder in camboot for better fit during amb. Pt instructed on how to negotiate stairs appropriately w/ good understanding.  present during session & was able to observed & provide return demo/feedback. All questions answered as far as PT is concerned.The patient's AM-PAC Basic Mobility Inpatient Short Form Raw Score is 18. A Raw score of greater than 16 suggests the patient may benefit from discharge to home. Please also refer to the recommendation of the Physical Therapist for safe discharge planning. From PT standpoint, pt may return home when medically cleared. Will recommend  RW for amb. RN notified. CM to follow. Will D/C PT. Pt may ambulate w/ RW in unit as tolerated.   Barriers to Discharge None   Goals   Patient Goals to go home   PT Treatment Day 0   Plan   Treatment/Interventions Spoke to nursing;Family  (PT eval only)   PT Frequency Other (Comment)  (D/C PT)   Discharge Recommendation   Rehab Resource Intensity Level, PT No post-acute rehabilitation needs   Equipment Recommended Walker   Walker Package Recommended Wheeled walker   AM-PAC Basic Mobility Inpatient   Turning in Flat Bed Without Bedrails 3   Lying on Back to Sitting on Edge of Flat Bed Without Bedrails 3   Moving Bed to Chair 3   Standing Up From Chair Using Arms 3   Walk in Room 3   Climb 3-5 Stairs With Railing 3   Basic Mobility Inpatient Raw Score 18   Basic Mobility Standardized Score 41.05   [facility] Highest Level Of Mobility   -HLM Goal 6: Walk 10 steps or more   -HLM Achieved 6: Walk 10 steps or more   End of Consult   Patient Position at End of Consult Seated edge of bed;All needs within reach   End of Consult Comments Pt in stable condition. All needs in reach.   Hx/personal factors: co-morbidities, inaccessible home, use of AD, pain, WB restrictions, h/o of falls, and fall risk  Examination: dec amb, risk for falls, pain, WB restrictions  Clinical: unpredictable (ongoing medical status and pain mgt)  Complexity: high    Juan Jose Ortiz

## 2025-04-30 NOTE — CASE MANAGEMENT
Case Management Progress Note    Patient name Jacquie Gardner  Location /-01 MRN [id number]  : 1995 Date 2025       LOS (days): 2  Geometric Mean LOS (GMLOS) (days):   Days to GMLOS:        OBJECTIVE:        Current admission status: Inpatient  Preferred Pharmacy:   [id number] [place] - [address]  [address] [address]  Phone: [phone] Fax: [phone]    Primary Care Provider: Maria Luz Soler DO    Primary Insurance: [facility] [place]  Secondary Insurance:     PROGRESS NOTE:    Pt w/ closed fracture of distal end of R fibula. S/p ortho and PT consult. PT recommending walker. Placed order via [place] and walker was approved. $0 copay, able to dispense from consignment. Delivered walker to bedside. Pt signed delivery ticket. Will remain available for any further needs.

## 2025-04-30 NOTE — PROGRESS NOTES
"Progress Note - OB/GYN  Jacquie Gardner 29 y.o. female MRN: [id number]  Unit/Bed#:  [facility] Encounter: [id number]    Assessment and Plan     Jacquie Gardner is a patient of: [facility]. She is PPD# 2 s/p  repeat  section, low transverse incision  Recovering well and is stable       Closed fracture of distal end of right fibula with routine healing  Assessment & Plan  S/p ortho consult; recommend conservative management and outpatient follow up    Asthma  Assessment & Plan  - albuterol PRN    * Status post repeat low transverse  section  Assessment & Plan  QBL: 899cc; admission Hb 13.2g/dL -> 9.3g/dL, Venofer ordered. Patient progressing toward postoperative milestones  - Continue routine postoperative care  - Pain management with oral analgesics; s/p oxycodone 5mg x1 over last 12hrs  - DVT Ppx: Lovenox 40mg qd; encourage ambulation  - F/u void trial  - Encourage breastfeeding, familial bonding  - Contraception: s/p bilateral salpingectomy at time of         Disposition    - Anticipate discharge home on PPD# 3-4      Subjective/Objective     Chief Complaint: Postpartum State     Subjective:    Jacquie Gardner is POD#2 s/p  repeat  section, low transverse incision. She has no current complaints.  Pain is well controlled. She is recovering well and is stable.     Breastfeeding:  yes    Tolerating PO: yes  Nausea or Vomiting: no  Voiding: yes  Flatus: yes; has had no bowel movement.   Ambulating: yes  Chest pain: no  Shortness of breath: no  Leg pain: no  Lochia: appropriate     Vitals:   /58 (BP Location: Right arm)   Pulse 84   Temp 98.5 °F (36.9 °C) (Oral)   Resp 16   Ht 5' 2\" (1.575 m)   Wt 85 kg (187 lb 4.8 oz)   LMP 2024 (Exact Date)   SpO2 98%   Breastfeeding Yes   BMI 34.26 kg/m²       Intake/Output Summary (Last 24 hours) at 2025 0703  Last data filed at 2025 0601  Gross per 24 hour   Intake --   Output 1800 ml   Net -1800 ml "       Invasive Devices       Peripheral Intravenous Line  Duration             Peripheral IV 04/28/25 Left;Dorsal (posterior) Hand 2 days                    Physical Exam:   GEN: Jacquie Gardner appears well, alert and oriented x 3, pleasant and cooperative   CARDIO: RRR, no murmurs or rubs  RESP:  CTAB, no wheezes or rales  ABDOMEN: soft, no tenderness, no distention, fundus @ U, Incision C/D/I  EXTREMITIES: SCDs on, non tender, no erythema, b/l Aileen's sign negative      Labs:     Hemoglobin   Date Value Ref Range Status   04/29/2025 9.3 (L) 11.5 - 15.4 g/dL Final   04/28/2025 13.2 11.5 - 15.4 g/dL Final     WBC   Date Value Ref Range Status   04/29/2025 9.13 4.31 - 10.16 Thousand/uL Final   04/28/2025 7.52 4.31 - 10.16 Thousand/uL Final     Platelets   Date Value Ref Range Status   04/29/2025 234 149 - 390 Thousands/uL Final   04/28/2025 299 149 - 390 Thousands/uL Final     Creatinine   Date Value Ref Range Status   09/20/2019 0.50 (L) 0.60 - 1.30 mg/dL Final     Comment:     Standardized to IDMS reference method   09/12/2018 0.64 0.60 - 1.30 mg/dL Final     Comment:     Standardized to IDMS reference method     AST   Date Value Ref Range Status   09/20/2019 19 5 - 45 U/L Final     Comment:       Specimen collection should occur prior to Sulfasalazine administration due to the potential for falsely depressed results.    09/12/2018 27 5 - 45 U/L Final     Comment:     Slightly Hemolyzed; Results May be Affected  Specimen collection should occur prior to Sulfasalazine administration due to the potential for falsely depressed results.      ALT   Date Value Ref Range Status   09/20/2019 23 12 - 78 U/L Final     Comment:       Specimen collection should occur prior to Sulfasalazine administration due to the potential for falsely depressed results.    09/12/2018 23 12 - 78 U/L Final     Comment:       Specimen collection should occur prior to Sulfasalazine administration due to the potential for falsely  depressed results.           TERE Rincon  4/30/2025  7:03 AM

## 2025-05-01 VITALS
HEART RATE: 90 BPM | BODY MASS INDEX: 34.47 KG/M2 | WEIGHT: 187.3 LBS | RESPIRATION RATE: 18 BRPM | HEIGHT: 62 IN | SYSTOLIC BLOOD PRESSURE: 111 MMHG | DIASTOLIC BLOOD PRESSURE: 69 MMHG | TEMPERATURE: 98.1 F | OXYGEN SATURATION: 98 %

## 2025-05-01 PROCEDURE — 99024 POSTOP FOLLOW-UP VISIT: CPT | Performed by: OBSTETRICS & GYNECOLOGY

## 2025-05-01 PROCEDURE — 88302 TISSUE EXAM BY PATHOLOGIST: CPT | Performed by: SPECIALIST

## 2025-05-01 PROCEDURE — NC001 PR NO CHARGE: Performed by: OBSTETRICS & GYNECOLOGY

## 2025-05-01 RX ORDER — ACETAMINOPHEN 325 MG/1
975 TABLET ORAL EVERY 8 HOURS PRN
Status: DISCONTINUED | OUTPATIENT
Start: 2025-05-01 | End: 2025-05-01 | Stop reason: HOSPADM

## 2025-05-01 RX ORDER — OXYCODONE HYDROCHLORIDE 5 MG/1
5 TABLET ORAL EVERY 4 HOURS PRN
Qty: 20 TABLET | Refills: 0 | Status: SHIPPED | OUTPATIENT
Start: 2025-05-01 | End: 2025-05-11

## 2025-05-01 RX ORDER — IBUPROFEN 600 MG/1
600 TABLET, FILM COATED ORAL EVERY 6 HOURS
Qty: 30 TABLET | Refills: 0 | Status: SHIPPED | OUTPATIENT
Start: 2025-05-01

## 2025-05-01 RX ADMIN — ACETAMINOPHEN 975 MG: 325 TABLET, FILM COATED ORAL at 12:00

## 2025-05-01 RX ADMIN — OXYCODONE HYDROCHLORIDE 10 MG: 10 TABLET ORAL at 06:04

## 2025-05-01 RX ADMIN — ACETAMINOPHEN 650 MG: 325 TABLET, FILM COATED ORAL at 04:53

## 2025-05-01 RX ADMIN — IBUPROFEN 600 MG: 600 TABLET ORAL at 08:12

## 2025-05-01 RX ADMIN — IBUPROFEN 600 MG: 600 TABLET ORAL at 15:09

## 2025-05-01 RX ADMIN — IBUPROFEN 600 MG: 600 TABLET ORAL at 01:04

## 2025-05-01 RX ADMIN — DOCUSATE SODIUM 100 MG: 100 CAPSULE, LIQUID FILLED ORAL at 08:12

## 2025-05-01 RX ADMIN — ENOXAPARIN SODIUM 40 MG: 40 INJECTION SUBCUTANEOUS at 08:12

## 2025-05-01 NOTE — PLAN OF CARE
Problem: PAIN - ADULT  Goal: Verbalizes/displays adequate comfort level or baseline comfort level  Description: Interventions:- Encourage patient to monitor pain and request assistance- Assess pain using appropriate pain scale- Administer analgesics based on type and severity of pain and evaluate response- Implement non-pharmacological measures as appropriate and evaluate response- Consider cultural and social influences on pain and pain management- Notify physician/advanced practitioner if interventions unsuccessful or patient reports new pain  Outcome: Progressing     Problem: INFECTION - ADULT  Goal: Absence or prevention of progression during hospitalization  Description: INTERVENTIONS:- Assess and monitor for signs and symptoms of infection- Monitor lab/diagnostic results- Monitor all insertion sites, i.e. indwelling lines, tubes, and drains- Monitor endotracheal if appropriate and nasal secretions for changes in amount and color- [place] appropriate cooling/warming therapies per order- Administer medications as ordered- Instruct and encourage patient and family to use good hand hygiene technique- Identify and instruct in appropriate isolation precautions for identified infection/condition  Outcome: Progressing  Goal: Absence of fever/infection during neutropenic period  Description: INTERVENTIONS:- Monitor WBC  Outcome: Progressing     Problem: SAFETY ADULT  Goal: Patient will remain free of falls  Description: INTERVENTIONS:- Educate patient/family on patient safety including physical limitations- Instruct patient to call for assistance with activity - Consult OT/PT to assist with strengthening/mobility - Keep Call bell within reach- Keep bed low and locked with side rails adjusted as appropriate- Keep care items and personal belongings within reach- Initiate and maintain comfort rounds- Apply yellow socks and bracelet for high fall risk patients- Consider moving patient to room near nurses  station  Outcome: Progressing  Goal: Maintain or return to baseline ADL function  Description: INTERVENTIONS:-  Assess patient's ability to carry out ADLs; assess patient's baseline for ADL function and identify physical deficits which impact ability to perform ADLs (bathing, care of mouth/teeth, toileting, grooming, dressing, etc.)- Assess/evaluate cause of self-care deficits - Assess range of motion- Assess patient's mobility; develop plan if impaired- Assess patient's need for assistive devices and provide as appropriate- Encourage maximum independence but intervene and supervise when necessary- Involve family in performance of ADLs- Assess for home care needs following discharge - Consider OT consult to assist with ADL evaluation and planning for discharge- Provide patient education as appropriate  Outcome: Progressing  Goal: Maintains/Returns to pre admission functional level  Description: INTERVENTIONS:- Perform AM-PAC 6 Click Basic Mobility/ Daily Activity assessment daily.- Set and communicate daily mobility goal to care team and patient/family/caregiver. - Collaborate with rehabilitation services on mobility goals if consulted- Perform Range of   Outcome: Progressing     Problem: Knowledge Deficit  Goal: Patient/family/caregiver demonstrates understanding of disease process, treatment plan, medications, and discharge instructions  Description: Complete learning assessment and assess knowledge base.Interventions:- Provide teaching at level of understanding- Provide teaching via preferred learning methods  Outcome: Progressing     Problem: DISCHARGE PLANNING  Goal: Discharge to home or other facility with appropriate resources  Description: INTERVENTIONS:- Identify barriers to discharge w/patient and caregiver- Arrange for needed discharge resources and transportation as appropriate- Identify discharge learning needs (meds, wound care, etc.)- Arrange for interpretive services to assist at discharge as needed-  Refer to Case Management Department for coordinating discharge planning if the patient needs post-hospital services based on physician/advanced practitioner order or complex needs related to functional status, cognitive ability, or social support system  Outcome: Progressing     Problem: POSTPARTUM  Goal: Experiences normal postpartum course  Description: INTERVENTIONS:- Monitor maternal vital signs- Assess uterine involution and lochia  Outcome: Progressing  Goal: Appropriate maternal -  bonding  Description: INTERVENTIONS:- Identify family support- Assess for appropriate maternal/infant bonding -Encourage maternal/infant bonding opportunities- Referral to  or  as needed  Outcome: Progressing  Goal: Establishment of infant feeding pattern  Description: INTERVENTIONS:- Assess breast/bottle feeding- Refer to lactation as needed  Outcome: Progressing  Goal: Incision(s), wounds(s) or drain site(s) healing without S/S of infection  Description: INTERVENTIONS- Assess and document dressing, incision, wound bed, drain sites and surrounding tissue- Provide patient and family education- Perform skin care/dressing changes every   Outcome: Progressing

## 2025-05-01 NOTE — LACTATION NOTE
This note was copied from a baby's chart.  CONSULT - LACTATION  Baby Boy (Chris Gardner 3 days male MRN: [id number]    [facility] NURSERY Room / Bed: (N)/(N) Encounter: [id number]    Maternal Information     MOTHER:  Jacquie Patel  Maternal Age: 29 y.o.  OB History: # 1 - Date: 20, Sex: Female, Weight: 3380 g (7 lb 7.2 oz), GA: 40w1d, Type: , Low Transverse, Apgar1: 9, Apgar5: 9, Living: Living, Birth Comments: Dr. Brandon present for delivery in OR    # 2 - Date: 25, Sex: Male, Weight: 3130 g (6 lb 14.4 oz), GA: 39w2d, Type: , Low Transverse, Apgar1: 8, Apgar5: 9, Living: Living, Birth Comments: None   Previous breast reduction surgery? No    Lactation history:   Has patient previously breast fed: Yes   How long had patient previously breast fed: 2 months   Previous breast feeding complications: Other (Comment) (Mixed feeding plan)     Past Surgical History:   Procedure Laterality Date    MT  DELIVERY ONLY N/A 2020    Procedure:  SECTION ();  Surgeon: Almaz Guzman DO;  Location: [facility];  Service: Obstetrics    MT  DELIVERY ONLY N/A 2025    Procedure:  SECTION () REPEAT;  Surgeon: Obed Ross MD;  Location: [facility];  Service: Obstetrics    TUBAL LIGATION Bilateral 2025    Procedure: LIGATION/COAGULATION TUBAL;  Surgeon: Obed Ross MD;  Location: [facility];  Service: Obstetrics       Birth information:  YOB: 2023   Time of birth: 8:42 AM   Sex: male   Delivery type: , Low Transverse   Birth Weight: 3130 g (6 lb 14.4 oz)   Percent of Weight Change: -5%     Gestational Age: 39w2d   [unfilled]    Reason for Consult:    Reason for Consult: Discharge (routine) - 10 min, Latch Assess (ext) - 20 min    Risk Factors:         Breast and nipple assessment:   Breasts/Nipples  Left Breast: Filling, Firm  Right Breast: Filling, Firm  Left  Nipple: Everted, Tender  Right Nipple: Everted, Tender  Intervention: Breast pump, Lanolin, Hydrogel pads  Breastfeeding Progress: Not yet established, Breastfeeding well    OB Lactation Tools:    Other OB Lactation Tools  Feeding Devices: Lanolin, Comfort Gels    Breast Pump:    Breast Pump  Pump: Manual (For engorgement)  Pump Review/Education: Setup, frequency, and cleaning, Milk storage       Assessment: normal assessment    Feeding assessment: feeding well audible swallows, gulping. Had to take baby off the breast to burp and bring him back on.    LATCH:  Latch: Grasps breast, tongue down, lips flanged, rhythmic sucking   Audible Swallowing: Spontaneous and intermittent (24 hours old)   Type of Nipple: Everted (After stimulation)   Comfort (Breast/Nipple): Filling, red/small blisters/bruises, mild/moderate discomfort   Hold (Positioning): Partial assist, teach one side, mother does other, staff holds   LATCH Score: 8       Feeding recommendations:  breast feed on demand every 2-3 hours, watching for early feeding cues. At least 8-12 feedings in 24 hours.    Consulted with Jacquie to follow up and discuss discharge breastfeeding anticipation guidelines.    Jacquie reports that she has pain at the breast when baby latches. Baby's oral anatomy was assessed with gloved digit. Latch assessment was also done.     Baby was brought to breast in football position. Encouraged patient to unswaddle baby. Positioning was adjusted, aligned baby's nose to nipple.   Encouraged patient to gently compress the breast as if offering a sandwich with fingers and thumb in parallel with baby's lips to achieve deep latch.  Encouraged to support the base of the baby's neck, avoiding holding the back of the baby's head to allow the baby to move as they need to  Reviewed to align baby's nose to the nipple and allowing for the baby to open wide, with the baby's chin touching the breast first.  Reviewed that baby's cheeks and chin are  touching mother's breast, not seeing the nipple bopping in and out of baby's mouth.    Discussed that initial latch can be painful at first but should wear off and should not be continuous throughout the feeding. Reviewed that the appearence of nipple should unchanged and round after the baby was latched with no compression stripe should be visible. Discussed that if it feels like baby is continuously pinching at the breast, encouraged to break the suction by putting pinky in the corner of the baby's mouth and relatching the baby.    Patient noted difference in latch and reports that the initial latch is painful. Patient also has tender nipples d/t previous latches and baby cluster feeding.   Audible swallowing, active jaw movements noted. Baby had to be burped in the middle of the feed.  Mother's milk is transitioning.    Discussed overactive milk letdown with patient. Encouraged to feed baby in an upright position- laid back positioning/stradle hold for baby to pace the feeds. Also encouraged to hand express prior to latching the baby to assist with the strong let down. Discussed occluding at the breast to help with slowing down the flow of milk. Encouraged to have baby sit upright after feeds for at least 10 minutes.     Discussed initial engorgement time frame and reviewed engorgement comfort measures.  Provided with manual breast pump.   Discussed using it when baby is not due to feed to relieve breasts. Reviewed using it before baby latches to soften the breast.  Reviewed appropriate measures and timeline of introducing use of manual breast pump vs electric.  Reviewed breast milk storage guidelines.  Discussed avoiding/limiting use of pacifiers or bottles until breastfeeding well established, adequate milk supply and baby is back to birth weight.     Wet wound care reviewed. Provided with lanolin and telfa pads. Encouraged to watch for lanolin sensitivity.  Patient is also encouraged to pay close attention to  latch and positioning.     Feeding plan:  Family is encouraged to breastfeed on demand, every 2-3 hours or when baby showing early feeding cues. Reviewed to offer both breasts during a feed.  Discussed the importance of ensuring that baby feeds 8-12x in 24 hours and not waiting over 3 hours to feed. Educated to watch the baby for hunger and fullness cues.    Reviewed baby's belly size and cluster feeding. Discussed cluster feeding is a normal baby behavior and helps bring in a good milk supply.     Encouraged family to monitor baby's outputs, ensuring baby is reaching goal diapers. Discussed that soiled diapers transition by changing color from black meconium to yellow/seedy by day 5.    Discussed community resources for continued breastfeeding support once discharged home. Encouraged to contact with [facility] as needed.    Patient was encouraged to contact lactation for a latch assessment, continued support and/or questions that arise before discharge.    GABRIELA Sequeira  5/1/2025 2:36 PM

## 2025-05-01 NOTE — PROGRESS NOTES
Progress Note - OB/GYN   Name: Jacquie Gardner 29 y.o. female I MRN: [id number]  Unit/Bed#: -01 I Date of Admission: 2025   Date of Service: 2025 I Hospital Day: 3       Assessment and Plan   Jacquie Gardner is a patient of: [facility]. She is POD#3 s/p repeat  section, low transverse incision, bilateral salpingectomy. Recovering well and stable.    Disposition   - Anticipate discharge home on POD#3-4  Assessment & Plan  Status post repeat low transverse  section  QBL: 899cc; admission Hb 13.2g/dL -> 9.3g/dL, Venofer ordered. Patient progressing toward postoperative milestones  - Continue routine postoperative care  - Pain management with oral analgesics; s/p oxycodone 5mg x1 over last 12hrs  - DVT Ppx: Lovenox 40mg qd; encourage ambulation  - F/u void trial  - Encourage breastfeeding, familial bonding  - Contraception: s/p bilateral salpingectomy at time of   Closed fracture of distal end of right fibula with routine healing  S/p ortho consult; recommend conservative management and outpatient follow up  Asthma  - albuterol PRN    OB Post-Partum Progress Note  Chief Concern: POD#3 s/p repeat  section, low transverse incision, bilateral salpingectomy  Subjective:    Pain is moderately controlled. She is currently voiding. She is currently passing flatus. She is ambulating. She is tolerating PO and denies nausea or vomitting. She denies chest pain, shortness of breath, lightheadedness. Lochia is normal.    Objective :  Temp:  [97.7 °F (36.5 °C)-98.5 °F (36.9 °C)] 98.2 °F (36.8 °C)  HR:  [78-87] 82  BP: ()/(61-74) 110/74  Resp:  [16-18] 16  SpO2:  [98 %-99 %] 98 %  O2 Device: None (Room air)    Physical Exam  GEN: well-appearing, alert and oriented x3  CARDIO: regular rate  RESP: nonlabored respirations on room air  ABDOMEN: soft, nontender, nodistended, fundus firm below the umbilicus, incision clean/dry/intact  LOWER EXTREMITIES: splint at  right foot    Labs:   Hemoglobin   Date Value Ref Range Status   04/29/2025 9.3 (L) 11.5 - 15.4 g/dL Final   04/28/2025 13.2 11.5 - 15.4 g/dL Final     WBC   Date Value Ref Range Status   04/29/2025 9.13 4.31 - 10.16 Thousand/uL Final   04/28/2025 7.52 4.31 - 10.16 Thousand/uL Final     Platelets   Date Value Ref Range Status   04/29/2025 234 149 - 390 Thousands/uL Final   04/28/2025 299 149 - 390 Thousands/uL Final     Creatinine   Date Value Ref Range Status   09/20/2019 0.50 (L) 0.60 - 1.30 mg/dL Final     Comment:     Standardized to IDMS reference method   09/12/2018 0.64 0.60 - 1.30 mg/dL Final     Comment:     Standardized to IDMS reference method     AST   Date Value Ref Range Status   09/20/2019 19 5 - 45 U/L Final     Comment:       Specimen collection should occur prior to Sulfasalazine administration due to the potential for falsely depressed results.    09/12/2018 27 5 - 45 U/L Final     Comment:     Slightly Hemolyzed; Results May be Affected  Specimen collection should occur prior to Sulfasalazine administration due to the potential for falsely depressed results.      ALT   Date Value Ref Range Status   09/20/2019 23 12 - 78 U/L Final     Comment:       Specimen collection should occur prior to Sulfasalazine administration due to the potential for falsely depressed results.    09/12/2018 23 12 - 78 U/L Final     Comment:       Specimen collection should occur prior to Sulfasalazine administration due to the potential for falsely depressed results.           Naresh Hunter MD  OBGYN PGY-1  05/01/25  6:25 AM

## 2025-05-01 NOTE — PLAN OF CARE
Problem: PAIN - ADULT  Goal: Verbalizes/displays adequate comfort level or baseline comfort level  Description: Interventions:- Encourage patient to monitor pain and request assistance- Assess pain using appropriate pain scale- Administer analgesics based on type and severity of pain and evaluate response- Implement non-pharmacological measures as appropriate and evaluate response- Consider cultural and social influences on pain and pain management- Notify physician/advanced practitioner if interventions unsuccessful or patient reports new pain  Outcome: Progressing     Problem: INFECTION - ADULT  Goal: Absence or prevention of progression during hospitalization  Description: INTERVENTIONS:- Assess and monitor for signs and symptoms of infection- Monitor lab/diagnostic results- Monitor all insertion sites, i.e. indwelling lines, tubes, and drains- Monitor endotracheal if appropriate and nasal secretions for changes in amount and color- [place] appropriate cooling/warming therapies per order- Administer medications as ordered- Instruct and encourage patient and family to use good hand hygiene technique- Identify and instruct in appropriate isolation precautions for identified infection/condition  Outcome: Progressing  Goal: Absence of fever/infection during neutropenic period  Description: INTERVENTIONS:- Monitor WBC  Outcome: Progressing     Problem: SAFETY ADULT  Goal: Patient will remain free of falls  Description: INTERVENTIONS:- Educate patient/family on patient safety including physical limitations- Instruct patient to call for assistance with activity - Consult OT/PT to assist with strengthening/mobility - Keep Call bell within reach- Keep bed low and locked with side rails adjusted as appropriate- Keep care items and personal belongings within reach- Initiate and maintain comfort rounds  Outcome: Progressing  Goal: Maintain or return to baseline ADL function  Description: INTERVENTIONS:-  Assess patient's  ability to carry out ADLs; assess patient's baseline for ADL function and identify physical deficits which impact ability to perform ADLs (bathing, care of mouth/teeth, toileting, grooming, dressing, etc.)- Assess/evaluate cause of self-care deficits - Assess range of motion- Assess patient's mobility; develop plan if impaired- Assess patient's need for assistive devices and provide as appropriate- Encourage maximum independence but intervene and supervise when necessary- Involve family in performance of ADLs- Assess for home care needs following discharge - Consider OT consult to assist with ADL evaluation and planning for discharge- Provide patient education as appropriate  Outcome: Progressing  Goal: Maintains/Returns to pre admission functional level  Description: INTERVENTIONS:- Perform AM-PAC 6 Click Basic Mobility/ Daily Activity assessment daily.- Set and communicate daily mobility goal to care team and patient/family/caregiver. - Collaborate with rehabilitation services on mobility goals if consulted  Outcome: Progressing     Problem: Knowledge Deficit  Goal: Patient/family/caregiver demonstrates understanding of disease process, treatment plan, medications, and discharge instructions  Description: Complete learning assessment and assess knowledge base.Interventions:- Provide teaching at level of understanding- Provide teaching via preferred learning methods  Outcome: Progressing     Problem: DISCHARGE PLANNING  Goal: Discharge to home or other facility with appropriate resources  Description: INTERVENTIONS:- Identify barriers to discharge w/patient and caregiver- Arrange for needed discharge resources and transportation as appropriate- Identify discharge learning needs (meds, wound care, etc.)- Arrange for interpretive services to assist at discharge as needed- Refer to Case Management Department for coordinating discharge planning if the patient needs post-hospital services based on physician/advanced  practitioner order or complex needs related to functional status, cognitive ability, or social support system  Outcome: Progressing     Problem: POSTPARTUM  Goal: Experiences normal postpartum course  Description: INTERVENTIONS:- Monitor maternal vital signs- Assess uterine involution and lochia  Outcome: Progressing  Goal: Appropriate maternal -  bonding  Description: INTERVENTIONS:- Identify family support- Assess for appropriate maternal/infant bonding -Encourage maternal/infant bonding opportunities- Referral to  or  as needed  Outcome: Progressing  Goal: Establishment of infant feeding pattern  Description: INTERVENTIONS:- Assess breast/bottle feeding- Refer to lactation as needed  Outcome: Progressing  Goal: Incision(s), wounds(s) or drain site(s) healing without S/S of infection  Description: INTERVENTIONS- Assess and document dressing, incision, wound bed, drain sites and surrounding tissue  Outcome: Progressing

## 2025-05-02 NOTE — UTILIZATION REVIEW
"Mother and baby discharged 2025         NOTIFICATION OF INPATIENT ADMISSION   MATERNITY/DELIVERY AUTHORIZATION REQUEST   SERVICING FACILITY:   [facility] - L&D, [place], NICU  [address] [place] [address]  Tax ID: [id number]  NPI: [id number] ATTENDING PROVIDER:  Attending Name and NPI#: Obed Ross Md [[id number]]  Address: [address] [place] [address]  Phone: [phone]     ADMISSION INFORMATION:  Place of Service: Inpatient [facility]  Place of Service Code: 21  Inpatient Admission Date/Time: 25  6:18 AM  Discharge Date/Time: 2025  5:52 PM  Admitting Diagnosis Code/Description:  Encounter for full-term uncomplicated delivery [O80]   Mother: Jacquie Gardner 1995 Estimated Date of Delivery: 5/3/25  Delivering clinician: Obed Ross   OB History          2    Para   2    Term   2       0    AB   0    Living   2         SAB   0    IAB   0    Ectopic   0    Multiple   0    Live Births   2                Name & MRN:   Information for the patient's :  Param Gardner, Baby Boy (Jacquie) [[id number]]    Delivery Information:  Sex: male  Delivered 2025 8:42 AM by , Low Transverse; Gestational Age: 39w2d    [place] Measurements:  Weight: 6 lb 14.4 oz (3130 g);  Height: 19.5\"    APGAR 1 minute 5 minutes 10 minutes   Totals: 8 9       UTILIZATION REVIEW CONTACT:  Alysha Estrada Utilization   Network Utilization Review Department  Phone: [phone]  Fax [phone]  Email: [email or web address]  Contact for approvals/pending authorizations, clinical reviews, and discharge.   PHYSICIAN ADVISORY SERVICES:  Medical Necessity Denial & Rcvi-cn-Qggr Review  Phone: [phone]  Fax: [phone]  Email: [email or web address]   DISCHARGE SUPPORT TEAM:  For Patients Discharge Needs & Updates  Phone: [phone] opt. 2 Fax: " [phone]  Email: [email or web address]

## 2025-05-06 ENCOUNTER — OFFICE VISIT (OUTPATIENT)
Dept: OBGYN CLINIC | Facility: CLINIC | Age: 30
End: 2025-05-06
Attending: PHYSICIAN ASSISTANT
Payer: MEDICARE

## 2025-05-06 ENCOUNTER — APPOINTMENT (OUTPATIENT)
Dept: RADIOLOGY | Age: 30
End: 2025-05-06
Payer: MEDICARE

## 2025-05-06 VITALS — HEIGHT: 62 IN | BODY MASS INDEX: 34.41 KG/M2 | WEIGHT: 187 LBS

## 2025-05-06 DIAGNOSIS — S82.401A CLOSED RIGHT FIBULAR FRACTURE: ICD-10-CM

## 2025-05-06 DIAGNOSIS — S82.891A CLOSED FRACTURE OF RIGHT ANKLE, INITIAL ENCOUNTER: Primary | ICD-10-CM

## 2025-05-06 DIAGNOSIS — S82.891A CLOSED FRACTURE OF RIGHT ANKLE, INITIAL ENCOUNTER: ICD-10-CM

## 2025-05-06 LAB — PLACENTA IN STORAGE: NORMAL

## 2025-05-06 PROCEDURE — 99204 OFFICE O/P NEW MOD 45 MIN: CPT | Performed by: ORTHOPAEDIC SURGERY

## 2025-05-06 PROCEDURE — 73610 X-RAY EXAM OF ANKLE: CPT

## 2025-05-06 NOTE — PROGRESS NOTES
Sports Medicine and Shoulder Surgery    Jacquie Gardner, 29 y.o. female   MRN# [id number]   : 1995        Assessment & Plan  Closed fracture of right ankle, initial encounter    Orders:    XR ankle 3+ vw right; Future    Closed right fibular fracture    Orders:    Ambulatory Referral to Orthopedic Surgery         We had a lengthy discussion regarding the diagnosis, natural history, and treatment options of the patients ankle fracture. We discussed nonoperative treatment options, and the patient elects to proceed with nonoperative management. We discussed standard of care for fracture treatment, goals and expectations. Treatment plan discussed with patient that includes immobilization in a CAM boot WBAT with crutches/walker for assistance for a total of 4-6 weeks. We will have patient follow up for repeat imaging to ensure fracture healing. If any issues, questions, or concerns arise between now and the next appointment, we have encouraged the patient contact our team. Patient voiced their understanding of this plan, and they were in agreement with it. All questions answered.  Recommend RICE  Follow up:  1 month  If any issues, questions, or concerns arise between now and the next appointment, we have encouraged the patient contact our team.        Chief Complaint:    Right Ankle Pain and Dysfunction    Subjective:   Patient comes in for evaluation of the ankle.  Patient sustained an injury to the right ankle on 2025 when she missed the last step of her stairs.  Patient describes the pain primarily on the lateral aspect of the ankle.  She has been using a walker to help her ambulate.    Physical Examination:    Musculoskeletal: Right Ankle   Skin Intact               Swelling/ecchymosis diffuse over ankle/foot              TTP: Lateral malleolus   Range of motion and strength limited due to pain and stiffness   Sensation intact throughout Superficial peroneal, Deep peroneal, Tibial, Sural,  Saphenous distributions              EHL/TA/PF motor function intact to testing.               BCR              Gait: Antalgic        Imaging Studies:  Independent interpretation of the ankle x-rays demonstrate nondisplaced distal fibula fracture, intact ankle mortise       Allergies:  Allergies   Allergen Reactions    Shellfish-Derived Products - Food Allergy        Medications:    Current Outpatient Medications:     albuterol (PROVENTIL HFA,VENTOLIN HFA) 90 mcg/act inhaler, Inhale 2 puffs every 6 (six) hours as needed for wheezing or shortness of breath, Disp: 18 g, Rfl: 2    aspirin 81 mg chewable tablet, Chew 1 tablet (81 mg total) 2 (two) times a day for 28 days, Disp: 56 tablet, Rfl: 0    ibuprofen (MOTRIN) 600 mg tablet, Take 1 tablet (600 mg total) by mouth every 6 (six) hours, Disp: 30 tablet, Rfl: 0    oxyCODONE (ROXICODONE) 5 immediate release tablet, Take 1 tablet (5 mg total) by mouth every 4 (four) hours as needed for moderate pain for up to 10 days Max Daily Amount: 30 mg, Disp: 20 tablet, Rfl: 0    Prenatal MV-Min-Fe Fum-FA-DHA (PRENATAL 1 PO), Take by mouth, Disp: , Rfl:     Probiotic Product (UP4 PROBIOTICS PO), Take by mouth, Disp: , Rfl:     Past Medical History:  Past Medical History:   Diagnosis Date    Asthma     Albuterol prn    Varicella     as child and immunized        Past Surgical History:  Past Surgical History:   Procedure Laterality Date    [place]  DELIVERY ONLY N/A 2020    Procedure:  SECTION ();  Surgeon: Almaz Guzman DO;  Location: [facility];  Service: Obstetrics    FL  DELIVERY ONLY N/A 2025    Procedure:  SECTION () REPEAT;  Surgeon: Obed Ross MD;  Location: [facility];  Service: Obstetrics    TUBAL LIGATION Bilateral 2025    Procedure: LIGATION/COAGULATION TUBAL;  Surgeon: Obed Ross MD;  Location: [facility];  Service: Obstetrics        Family History:  Family History   Problem Relation Age of Onset    No Known  Problems Mother     No Known Problems Father     No Known Problems Daughter     Other Maternal Grandmother         accidental    Cancer Maternal Grandfather         possible stomach    Breast cancer Maternal Aunt         late 50s    Colon cancer Neg Hx     Ovarian cancer Neg Hx         Social History:  Social History     Socioeconomic History    Marital status: Single     Spouse name: Not on file    Number of children: Not on file    Years of education: Not on file    Highest education level: Not on file   Occupational History    Not on file   Tobacco Use    Smoking status: Never    Smokeless tobacco: Never   Vaping Use    Vaping status: Never Used   Substance and Sexual Activity    Alcohol use: Not Currently    Drug use: Never    Sexual activity: Yes     Partners: Male     Birth control/protection: None   Other Topics Concern    Not on file   Social History Narrative    Student     Social Drivers of Health     Financial Resource Strain: Not on file   Food Insecurity: No Food Insecurity (5/1/2025)    Nursing - Inadequate Food Risk Classification     Worried About Running Out of Food in the Last Year: Never true     Ran Out of Food in the Last Year: Never true     Ran Out of Food in the Last Year: Never true   Transportation Needs: No Transportation Needs (5/1/2025)    Nursing - Transportation Risk Classification     Lack of Transportation: Not on file     Lack of Transportation: No   Physical Activity: Not on file   Stress: Not on file   Social Connections: Not on file   Intimate Partner Violence: Unknown (5/1/2025)    Nursing IPS     Feels Physically and Emotionally Safe: Not on file     Physically Hurt by Someone: Not on file     Humiliated or Emotionally Abused by Someone: Not on file     Physically Hurt by Someone: No     Hurt or Threatened by Someone: No   Housing Stability: Unknown (5/1/2025)    Nursing: Inadequate Housing Risk Classification     Has Housing: Not on file     Worried About Losing Housing: Not  on file     Unable to Get Utilities: Not on file     Unable to Pay for Housing in the Last Year: No     Has Housin        Review of Systems:  General- denies fever/chills  HEENT- denies hearing loss or sore throat  Eyes- denies eye pain or visual disturbances, denies red eyes  Respiratory- denies cough or SOB  Cardio- denies chest pain or palpitations  GI- denies abdominal pain  Endocrine- denies urinary frequency  Urinary- denies pain with urination  Musculoskeletal- Negative except noted above  Skin- denies rashes or wounds  Neurological- denies dizziness or headache  Psychiatric- denies anxiety or difficulty concentrating     Objective:   Body mass index is 34.2 kg/m².      ---------------------------------------------------------------------  Sean Singh MD, PhD   Orthopedic Surgery, [facility]   Sports Medicine and Shoulder Surgery    The complexity of the medical decision making, including the comprehensive history, detailed examination and moderate risk assessment, supports coding at a Level 4 for this encounter     Scribe Attestation      I,:  Madhu Szymanski PA-C am acting as a scribe while in the presence of the attending physician.:       I,:  Sean Singh MD personally performed the services described in this documentation    as scribed in my presence.:

## 2025-05-07 ENCOUNTER — TELEPHONE (OUTPATIENT)
Dept: OBGYN CLINIC | Facility: MEDICAL CENTER | Age: 30
End: 2025-05-07

## 2025-05-07 NOTE — TELEPHONE ENCOUNTER
OB nurse navigator attempted to get in contact with patient for post partum assessment call but unable to complete call at this time. Patient unavailable. Left message on vm to return call. [employer] message sent.

## 2025-05-22 DIAGNOSIS — J45.40 MODERATE PERSISTENT ASTHMA WITHOUT COMPLICATION: ICD-10-CM

## 2025-05-22 NOTE — TELEPHONE ENCOUNTER
Reason for call:   [x] Refill   [] Prior Auth  [] Other:     Office:   [x] PCP/Provider -   [] Specialty/Provider -     Medication: albuterol (PROVENTIL HFA,VENTOLIN HFA) 90 mcg/act inhaler     Dose/Frequency: Inhale 2 puffs every 6 (six) hours as needed for wheezing or shortness of breath     Quantity: 18 g    Pharm[facility]     Local Pharmacy   Does the patient have enough for 3 days?   [x] Yes   [] No - Send as HP to POD

## 2025-05-23 RX ORDER — ALBUTEROL SULFATE 90 UG/1
2 INHALANT RESPIRATORY (INHALATION) EVERY 6 HOURS PRN
Qty: 18 G | Refills: 0 | OUTPATIENT
Start: 2025-05-23

## 2025-05-23 NOTE — PROGRESS NOTES
Post partum Visit        C section:4/28/25- repeat c section and tubal     2.  Breast feeding - supplementation    Breast pain or mastitis   Baby and me for lactation     3.  Post partum depression screening    Risk - low   Support at home [facility] center    4.  Sex - net yet     5.  Contraception / future fertility - salpingectomy at  c section      6.  Return to work -12 weeks     7.  Weight    Starting -145   Delivery -187   Total gain -43     Current- 171    8.  Annual    Pap 10/29/24- neg   Next due - oct for annual

## 2025-05-23 NOTE — TELEPHONE ENCOUNTER
Requested medication(s) are due for refill today: Yes  **If antibiotic or given during sick visit, contact patient to discuss current symptoms.   **Confirm prescribing provider    LOV:  09/28/23  **If longer then 1 year, contact patient to schedule annual PRIOR to refilling. Once scheduled, adjust refill for 30 days, no refills.  **Update CareEverywhere to confirm not being seen elsewhere    No future appointment, unable to refill. Called and sent portal to schedule.

## 2025-05-28 ENCOUNTER — POSTPARTUM VISIT (OUTPATIENT)
Dept: OBGYN CLINIC | Facility: MEDICAL CENTER | Age: 30
End: 2025-05-28
Payer: MEDICARE

## 2025-05-28 VITALS
DIASTOLIC BLOOD PRESSURE: 60 MMHG | WEIGHT: 171 LBS | HEIGHT: 62 IN | BODY MASS INDEX: 31.47 KG/M2 | SYSTOLIC BLOOD PRESSURE: 102 MMHG

## 2025-05-28 DIAGNOSIS — Z98.891 STATUS POST REPEAT LOW TRANSVERSE CESAREAN SECTION: Primary | ICD-10-CM

## 2025-06-03 ENCOUNTER — OFFICE VISIT (OUTPATIENT)
Dept: OBGYN CLINIC | Facility: CLINIC | Age: 30
End: 2025-06-03
Payer: MEDICARE

## 2025-06-03 ENCOUNTER — APPOINTMENT (OUTPATIENT)
Dept: RADIOLOGY | Age: 30
End: 2025-06-03
Attending: ORTHOPAEDIC SURGERY
Payer: MEDICARE

## 2025-06-03 VITALS — BODY MASS INDEX: 31.47 KG/M2 | WEIGHT: 171 LBS | HEIGHT: 62 IN

## 2025-06-03 DIAGNOSIS — S82.891A CLOSED FRACTURE OF RIGHT ANKLE, INITIAL ENCOUNTER: Primary | ICD-10-CM

## 2025-06-03 DIAGNOSIS — S82.891A CLOSED FRACTURE OF RIGHT ANKLE, INITIAL ENCOUNTER: ICD-10-CM

## 2025-06-03 PROCEDURE — 73610 X-RAY EXAM OF ANKLE: CPT

## 2025-06-03 PROCEDURE — 99213 OFFICE O/P EST LOW 20 MIN: CPT | Performed by: ORTHOPAEDIC SURGERY

## 2025-06-03 NOTE — PROGRESS NOTES
Sports Medicine and Shoulder Surgery    Jacquie Gardner, 29 y.o. female   MRN# [id number]   : 1995        Assessment & Plan  Closed fracture of right ankle, initial encounter    Orders:    XR ankle 3+ vw right; Future         We had a lengthy discussion regarding the diagnosis, natural history, and treatment options of the patients ankle fracture. We discussed nonoperative treatment options, and the patient elects to proceed with nonoperative management. We discussed standard of care for fracture treatment, goals and expectations. Treatment plan discussed with patient that includes immobilization in a CAM boot WBAT with crutches/walker for assistance for a total of 1-2 weeks. Can perform the boot weaning protocol in 1-2 weeks or sooner if you feel that you are ready too. We will have patient follow up for repeat imaging to ensure fracture healing. If any issues, questions, or concerns arise between now and the next appointment, we have encouraged the patient contact our team. Patient voiced their understanding of this plan, and they were in agreement with it. All questions answered.  Discussed that patient can be seen here in the office or if in [place] if they move back prior to 6 weeks.  Follow up: 6 week  If any issues, questions, or concerns arise between now and the next appointment, we have encouraged the patient contact our team.        Chief Complaint:    Right Ankle Pain and Dysfunction    Subjective:   Patient comes in for a follow up for her closed distal fibula fracture.    Physical Examination:    Musculoskeletal: Right Ankle   Skin Intact               Swelling/ecchymosis over lateral aspect of ankle              TTP: None   limited due to pain and stiffness   Sensation intact throughout Superficial peroneal, Deep peroneal, Tibial, Sural, Saphenous distributions              EHL/TA/PF motor function intact to testing.               BCR              Gait: Antalgic        Imaging  Studies:  Independent interpretation of the ankle x-rays demonstrate routine healing due to callus formation of previous closed distal fibula fracture.       Allergies:  Allergies   Allergen Reactions    Shellfish-Derived Products - Food Allergy        Medications:  Current Medications[1]    Past Medical History:  Past Medical History[2]     Past Surgical History:  Past Surgical History[3]     Family History:  Family History[4]     Social History:  Social History     Socioeconomic History    Marital status: Single     Spouse name: Not on file    Number of children: Not on file    Years of education: Not on file    Highest education level: Not on file   Occupational History    Not on file   Tobacco Use    Smoking status: Never    Smokeless tobacco: Never   Vaping Use    Vaping status: Never Used   Substance and Sexual Activity    Alcohol use: Not Currently    Drug use: Never    Sexual activity: Not Currently     Partners: Male     Birth control/protection: None   Other Topics Concern    Not on file   Social History Narrative    Student     Social Drivers of Health     Financial Resource Strain: Not on file   Food Insecurity: No Food Insecurity (5/1/2025)    Nursing - Inadequate Food Risk Classification     Worried About Running Out of Food in the Last Year: Never true     Ran Out of Food in the Last Year: Never true     Ran Out of Food in the Last Year: Never true   Transportation Needs: No Transportation Needs (5/1/2025)    Nursing - Transportation Risk Classification     Lack of Transportation: Not on file     Lack of Transportation: No   Physical Activity: Not on file   Stress: Not on file   Social Connections: Not on file   Intimate Partner Violence: Unknown (5/1/2025)    Nursing IPS     Feels Physically and Emotionally Safe: Not on file     Physically Hurt by Someone: Not on file     Humiliated or Emotionally Abused by Someone: Not on file     Physically Hurt by Someone: No     Hurt or Threatened by Someone: No    Housing Stability: Unknown (2025)    Nursing: Inadequate Housing Risk Classification     Has Housing: Not on file     Worried About Losing Housing: Not on file     Unable to Get Utilities: Not on file     Unable to Pay for Housing in the Last Year: No     Has Housin        Review of Systems:  General- denies fever/chills  HEENT- denies hearing loss or sore throat  Eyes- denies eye pain or visual disturbances, denies red eyes  Respiratory- denies cough or SOB  Cardio- denies chest pain or palpitations  GI- denies abdominal pain  Endocrine- denies urinary frequency  Urinary- denies pain with urination  Musculoskeletal- Negative except noted above  Skin- denies rashes or wounds  Neurological- denies dizziness or headache  Psychiatric- denies anxiety or difficulty concentrating     Objective:   Body mass index is 31.28 kg/m².      ---------------------------------------------------------------------  Sean Singh MD, PhD   Orthopedic Surgery, [facility]   Sports Medicine and Shoulder Surgery        Scribe Attestation      I,:  Calvin Nesbitt am acting as a scribe while in the presence of the attending physician.:       I,:  Sean Singh MD personally performed the services described in this documentation    as scribed in my presence.:                          [1]   Current Outpatient Medications:     albuterol (PROVENTIL HFA,VENTOLIN HFA) 90 mcg/act inhaler, Inhale 2 puffs every 6 (six) hours as needed for wheezing or shortness of breath, Disp: 18 g, Rfl: 2    ibuprofen (MOTRIN) 600 mg tablet, Take 1 tablet (600 mg total) by mouth every 6 (six) hours, Disp: 30 tablet, Rfl: 0    Prenatal MV-Min-Fe Fum-FA-DHA (PRENATAL 1 PO), Take by mouth, Disp: , Rfl:     Probiotic Product (UP4 PROBIOTICS PO), Take by mouth, Disp: , Rfl:     aspirin 81 mg chewable tablet, Chew 1 tablet (81 mg total) 2 (two) times a day for 28 days, Disp: 56 tablet, Rfl: 0  [2]   Past Medical History:  Diagnosis Date     Asthma     Albuterol prn    Depression 2015    Varicella     as child and immunized   [3]   Past Surgical History:  Procedure Laterality Date     SECTION  2020    [place]  DELIVERY ONLY N/A 2020    Procedure:  SECTION ();  Surgeon: Almaz Guzman DO;  Location[place] ;  Service: Obstetrics    [place]  DELIVERY ONLY N/A 2025    Procedure:  SECTION () REPEAT;  Surgeon: Obed Ross MD;  Location: [place] ;  Service: Obstetrics    TUBAL LIGATION Bilateral 2025    Procedure: LIGATION/COAGULATION TUBAL;  Surgeon: Obed Ross MD;  Location: [place] ;  Service: Obstetrics   [4]   Family History  Problem Relation Name Age of Onset    No Known Problems Mother      No Known Problems Father      No Known Problems Daughter      Other Maternal Grandmother          accidental    Cancer Maternal Grandfather Sheng         possible stomach    Breast cancer Maternal Aunt          late 50s    Anxiety disorder Maternal Uncle Ruddy Genao     Colon cancer Neg Hx      Ovarian cancer Neg Hx

## 2025-06-03 NOTE — PATIENT INSTRUCTIONS
You may now begin weaning your boot and transitioning to a sneaker. It is important to do this gradually to avoid aggravating the healing process. Begin in 1-2 weeks    Today, you may come out of the boot into a sneaker for 2 hours.  2. Tomorrow, you may come out of the boot into a sneaker for 4 hours,  3. The next day, you may come out of the boot into a sneaker for 6 hours.  4. Continue this (adding 2 hours per day) as you tolerate. For example, if you do 6 hours out of the boot into a sneaker and your foot swells more than usual at night and it is difficult to control the discomfort, do not advance to 8 hours the next day, stay at 6 hours until you are able to tolerate it.    Elevation, Ice and tylenol and staying off of it at night will be important to aide in this transition out of the boot. Swelling and soreness are normal as you begin to do more with the injured leg.

## 2025-06-12 ENCOUNTER — OFFICE VISIT (OUTPATIENT)
Dept: FAMILY MEDICINE CLINIC | Facility: CLINIC | Age: 30
End: 2025-06-12
Payer: MEDICARE

## 2025-06-12 ENCOUNTER — TELEPHONE (OUTPATIENT)
Dept: FAMILY MEDICINE CLINIC | Facility: CLINIC | Age: 30
End: 2025-06-12

## 2025-06-12 VITALS
DIASTOLIC BLOOD PRESSURE: 74 MMHG | BODY MASS INDEX: 30.3 KG/M2 | TEMPERATURE: 96.6 F | HEART RATE: 81 BPM | SYSTOLIC BLOOD PRESSURE: 106 MMHG | OXYGEN SATURATION: 97 % | WEIGHT: 171 LBS | HEIGHT: 63 IN

## 2025-06-12 DIAGNOSIS — E66.09 CLASS 1 OBESITY DUE TO EXCESS CALORIES WITH SERIOUS COMORBIDITY AND BODY MASS INDEX (BMI) OF 30.0 TO 30.9 IN ADULT: ICD-10-CM

## 2025-06-12 DIAGNOSIS — Z23 ENCOUNTER FOR IMMUNIZATION: ICD-10-CM

## 2025-06-12 DIAGNOSIS — J45.40 MODERATE PERSISTENT ASTHMA WITHOUT COMPLICATION: ICD-10-CM

## 2025-06-12 DIAGNOSIS — Z23 NEED FOR PNEUMOCOCCAL VACCINATION: ICD-10-CM

## 2025-06-12 DIAGNOSIS — Z00.01 ENCOUNTER FOR WELL ADULT EXAM WITH ABNORMAL FINDINGS: Primary | ICD-10-CM

## 2025-06-12 DIAGNOSIS — E66.811 CLASS 1 OBESITY DUE TO EXCESS CALORIES WITH SERIOUS COMORBIDITY AND BODY MASS INDEX (BMI) OF 30.0 TO 30.9 IN ADULT: ICD-10-CM

## 2025-06-12 PROBLEM — J45.909 ASTHMA AFFECTING PREGNANCY IN THIRD TRIMESTER: Status: RESOLVED | Noted: 2024-10-25 | Resolved: 2025-06-12

## 2025-06-12 PROBLEM — O99.513 ASTHMA AFFECTING PREGNANCY IN THIRD TRIMESTER: Status: RESOLVED | Noted: 2024-10-25 | Resolved: 2025-06-12

## 2025-06-12 PROCEDURE — 99395 PREV VISIT EST AGE 18-39: CPT

## 2025-06-12 PROCEDURE — 90471 IMMUNIZATION ADMIN: CPT

## 2025-06-12 PROCEDURE — 90677 PCV20 VACCINE IM: CPT

## 2025-06-12 PROCEDURE — 99214 OFFICE O/P EST MOD 30 MIN: CPT

## 2025-06-12 RX ORDER — ALBUTEROL SULFATE 90 UG/1
2 INHALANT RESPIRATORY (INHALATION) EVERY 6 HOURS PRN
Qty: 18 G | Refills: 2 | Status: SHIPPED | OUTPATIENT
Start: 2025-06-12

## 2025-06-12 RX ORDER — MOMETASONE FUROATE 110 UG/1
2 INHALANT RESPIRATORY (INHALATION) DAILY
Qty: 1 EACH | Refills: 1 | Status: SHIPPED | OUTPATIENT
Start: 2025-06-12

## 2025-06-12 NOTE — PATIENT INSTRUCTIONS
"Patient Education     Routine physical for adults   The Basics   Written by the doctors and editors at [facility]   What is a physical? -- A physical is a routine visit, or \"check-up,\" with your doctor. You might also hear it called a \"wellness visit\" or \"preventive visit.\"  During each visit, the doctor will:   Ask about your physical and mental health   Ask about your habits, behaviors, and lifestyle   Do an exam   Give you vaccines if needed   Talk to you about any medicines you take   Give advice about your health   Answer your questions  Getting regular check-ups is an important part of taking care of your health. It can help your doctor find and treat any problems you have. But it's also important for preventing health problems.  A routine physical is different from a \"sick visit.\" A sick visit is when you see a doctor because of a health concern or problem. Since physicals are scheduled ahead of time, you can think about what you want to ask the doctor.  How often should I get a physical? -- It depends on your age and health. In general, for people age 21 years and older:   If you are younger than 50 years, you might be able to get a physical every 3 years.   If you are 50 years or older, your doctor might recommend a physical every year.  If you have an ongoing health condition, like diabetes or high blood pressure, your doctor will probably want to see you more often.  What happens during a physical? -- In general, each visit will include:   Physical exam - The doctor or nurse will check your height, weight, heart rate, and blood pressure. They will also look at your eyes and ears. They will ask about how you are feeling and whether you have any symptoms that bother you.   Medicines - It's a good idea to bring a list of all the medicines you take to each doctor visit. Your doctor will talk to you about your medicines and answer any questions. Tell them if you are having any side effects that bother you. You " "should also tell them if you are having trouble paying for any of your medicines.   Habits and behaviors - This includes:   Your diet   Your exercise habits   Whether you smoke, drink alcohol, or use drugs   Whether you are sexually active   Whether you feel safe at home  Your doctor will talk to you about things you can do to improve your health and lower your risk of health problems. They will also offer help and support. For example, if you want to quit smoking, they can give you advice and might prescribe medicines. If you want to improve your diet or get more physical activity, they can help you with this, too.   Lab tests, if needed - The tests you get will depend on your age and situation. For example, your doctor might want to check your:   Cholesterol   Blood sugar   Iron level   Vaccines - The recommended vaccines will depend on your age, health, and what vaccines you already had. Vaccines are very important because they can prevent certain serious or deadly infections.   Discussion of screening - \"Screening\" means checking for diseases or other health problems before they cause symptoms. Your doctor can recommend screening based on your age, risk, and preferences. This might include tests to check for:   Cancer, such as breast, prostate, cervical, ovarian, colorectal, prostate, lung, or skin cancer   Sexually transmitted infections, such as chlamydia and gonorrhea   Mental health conditions like depression and anxiety  Your doctor will talk to you about the different types of screening tests. They can help you decide which screenings to have. They can also explain what the results might mean.   Answering questions - The physical is a good time to ask the doctor or nurse questions about your health. If needed, they can refer you to other doctors or specialists, too.  Adults older than 65 years often need other care, too. As you get older, your doctor will talk to you about:   How to prevent falling at " home   Hearing or vision tests   Memory testing   How to take your medicines safely   Making sure that you have the help and support you need at home  All topics are updated as new evidence becomes available and our peer review process is complete.  This topic retrieved from [employer] on: May 02, 2024.  Topic 023751 Version 1.0  Release: 32.4.3 - C32.122  © 2024 UpToDate, Inc. and/or its affiliates. All rights reserved.  Consumer Information Use and Disclaimer   Disclaimer: This generalized information is a limited summary of diagnosis, treatment, and/or medication information. It is not meant to be comprehensive and should be used as a tool to help the user understand and/or assess potential diagnostic and treatment options. It does NOT include all information about conditions, treatments, medications, side effects, or risks that may apply to a specific patient. It is not intended to be medical advice or a substitute for the medical advice, diagnosis, or treatment of a health care provider based on the health care provider's examination and assessment of a patient's specific and unique circumstances. Patients must speak with a health care provider for complete information about their health, medical questions, and treatment options, including any risks or benefits regarding use of medications. This information does not endorse any treatments or medications as safe, effective, or approved for treating a specific patient. UpToDate, Inc. and its affiliates disclaim any warranty or liability relating to this information or the use thereof.The use of this information is governed by the Terms of Use, available at https://www.woltersPa-[employer] Mobileuwer.com/en/know/clinical-effectiveness-terms. 2024© UpToDate, Inc. and its affiliates and/or licensors. All rights reserved.  Copyright   © 2024 UpToDate, Inc. and/or its affiliates. All rights reserved.

## 2025-06-12 NOTE — PROGRESS NOTES
Adult Annual Physical  Name: Jacquie Gardner      : 1995      MRN: [id number]  Encounter Provider: Sheyla Mera PA-C  Encounter Date: 2025   Encounter department: [facility]    :  Assessment & Plan  Encounter for well adult exam with abnormal findings  Patient here for annual exam. Patient is already established in practice  Last annual  with Dr. Soler, was seen in office since. Pt has not had annual in several years, annual performed today, but will continue care with Dr. Soler  Vaccinations reviewed: Pt is due for Prevnar, administered  Routine blood work ordered         Moderate persistent asthma without complication  Chronic, uncontrolled  After pregnancy 1 month ago, using albuterol inhaler every day, for the past couple weeks has been decreased to about every other day  -Recommend start on Asmanex 2 puffs once a day  -Use albuterol as needed  -Follow-up in 1 month  Orders:  •  mometasone (Asmanex, 30 Metered Doses,) 110 mcg/actuation AEPB inhaler; Inhale 2 puffs in the morning Rinse mouth after use.  •  albuterol (PROVENTIL HFA,VENTOLIN HFA) 90 mcg/act inhaler; Inhale 2 puffs every 6 (six) hours as needed for wheezing or shortness of breath    Class 1 obesity due to excess calories with serious comorbidity and body mass index (BMI) of 30.0 to 30.9 in adult  Chronic, uncontrolled  Recently had leg fracture   -Will order blood work to rule out metabolic causes.  Did just recently give birth 1 month ago  BMI Counseling: Body mass index is 30.68 kg/m². The BMI is above normal. Nutrition recommendations include reducing portion sizes, decreasing overall calorie intake, 3-5 servings of fruits/vegetables daily, reducing fast food intake, moderation in carbohydrate intake, and increasing intake of lean protein. Exercise recommendations include moderate aerobic physical activity for 150 minutes/week.    Orders:  •  Hemoglobin A1C; Future  •  Lipid panel;  Future  •  TSH, 3rd generation with Free T4 reflex; Future  •  Comprehensive metabolic panel; Future  •  CBC and differential; Future    Encounter for immunization    Orders:  •  Pneumococcal Conjugate Vaccine 20-valent (Pcv20)    Need for pneumococcal vaccination    Orders:  •  Pneumococcal Conjugate Vaccine 20-valent (Pcv20)        Preventive Screenings:  - Diabetes Screening: screening up-to-date  - Cholesterol Screening: orders placed   - Hepatitis C screening: screening up-to-date   - HIV screening: screening up-to-date   - Cervical cancer screening: screening up-to-date   - Colon cancer screening: screening not indicated   - Lung cancer screening: screening not indicated     Immunizations:  - Immunizations due: Prevnar 20  - Risks/benefits immunizations discussed    - Immunizations given per orders      Counseling/Anticipatory Guidance:  - Alcohol: discussed moderation in alcohol intake and recommendations for healthy alcohol use.   - Drug use: discussed harms of illicit drug use and how it can negatively impact mental/physical health.   - Tobacco use: discussed harms of tobacco use and management options for quitting.   - Sexual health: discussed sexually transmitted diseases, partner selection, use of condoms, avoidance of unintended pregnancy, and contraceptive alternatives.   - Diet: discussed recommendations for a healthy/well-balanced diet.   - Exercise: the importance of regular exercise/physical activity was discussed. Recommend exercise 3-5 times per week for at least 30 minutes.   - Injury prevention: discussed safety/seat belts, safety helmets, smoke detectors, carbon monoxide detectors, and smoking near bedding or upholstery.       Depression Screening and Follow-up Plan: Patient was screened for depression during today's encounter. They screened negative with a PHQ-2 score of 0.          History of Present Illness   {?Quick Links Encounters * My Last Note * Last Note in Specialty * Snapshot *  "Since Last Visit * History :27229}  Adult Annual Physical:  Patient presents for annual physical.     Diet and Physical Activity:  - Diet/Nutrition: no special diet.  - Exercise: no formal exercise.    Depression Screening:  - PHQ-2 Score: 0    General Health:  - Sleep: sleeps poorly and 4-6 hours of sleep on average.  - Hearing: normal hearing bilateral ears.  - Vision: no vision problems and most recent eye exam > 1 year ago.  - Dental: no dental visits for > 1 year, brushes teeth twice daily and floss regularly.    /GYN Health:  - Follows with GYN: yes.     Advanced Care Planning:  - Has an advanced directive?: no    - Has a durable medical POA?: yes    - ACP document given to patient?: yes      Review of Systems   Constitutional:  Negative for chills and fever.   HENT:  Negative for congestion and sore throat.    Respiratory:  Negative for cough and shortness of breath.    Cardiovascular:  Negative for chest pain and palpitations.   Gastrointestinal:  Negative for abdominal pain, constipation, diarrhea, nausea and vomiting.   Genitourinary:  Negative for difficulty urinating and menstrual problem.   Neurological:  Negative for headaches.     Medical History Reviewed by provider this encounter:  Tobacco  Allergies  Meds  Problems  Med Hx  Surg Hx  Fam Hx     .    Objective {?Quick Links Trend Vitals * Enter New Vitals * Results Review * Timeline (Adult) * Labs * Imaging * Cardiology * Procedures * Lung Cancer Screening * Surgical eConsent :92710}  /74   Pulse 81   Temp (!) 96.6 °F (35.9 °C) (Tympanic)   Ht 5' 2.6\" (1.59 m)   Wt 77.6 kg (171 lb)   LMP 07/27/2024 (Exact Date)   SpO2 97%   Breastfeeding Yes   BMI 30.68 kg/m²     Physical Exam  Vitals and nursing note reviewed.   Constitutional:       General: She is not in acute distress.     Appearance: She is well-developed.   HENT:      Head: Normocephalic and atraumatic.      Right Ear: Tympanic membrane, ear canal and external ear normal. "      Left Ear: Tympanic membrane, ear canal and external ear normal.      Nose: Nose normal.      Mouth/Throat:      Mouth: Mucous membranes are moist.      Pharynx: Oropharynx is clear.     Eyes:      Extraocular Movements: Extraocular movements intact.      Conjunctiva/sclera: Conjunctivae normal.      Pupils: Pupils are equal, round, and reactive to light.     Neck:      Thyroid: No thyroid mass or thyromegaly.     Cardiovascular:      Rate and Rhythm: Normal rate and regular rhythm.      Pulses: Normal pulses.      Heart sounds: No murmur heard.  Pulmonary:      Effort: Pulmonary effort is normal. No respiratory distress.      Breath sounds: Normal breath sounds. No wheezing, rhonchi or rales.   Abdominal:      General: Abdomen is flat. Bowel sounds are normal.      Palpations: Abdomen is soft.      Tenderness: There is no abdominal tenderness.      Comments: Dry, well-healed, incision above pubic bone.     Musculoskeletal:         General: No swelling. Normal range of motion.      Cervical back: Neck supple.     Skin:     General: Skin is warm and dry.      Capillary Refill: Capillary refill takes less than 2 seconds.     Neurological:      Mental Status: She is alert.     Psychiatric:         Mood and Affect: Mood normal.       Sheyla Mera PA-C

## 2025-06-12 NOTE — ASSESSMENT & PLAN NOTE
Chronic, uncontrolled  Recently had leg fracture   -Will order blood work to rule out metabolic causes.  Did just recently give birth 1 month ago  BMI Counseling: Body mass index is 30.68 kg/m². The BMI is above normal. Nutrition recommendations include reducing portion sizes, decreasing overall calorie intake, 3-5 servings of fruits/vegetables daily, reducing fast food intake, moderation in carbohydrate intake, and increasing intake of lean protein. Exercise recommendations include moderate aerobic physical activity for 150 minutes/week.    Orders:  •  Hemoglobin A1C; Future  •  Lipid panel; Future  •  TSH, 3rd generation with Free T4 reflex; Future  •  Comprehensive metabolic panel; Future  •  CBC and differential; Future

## 2025-06-12 NOTE — ASSESSMENT & PLAN NOTE
Chronic, uncontrolled  After pregnancy 1 month ago, using albuterol inhaler every day, for the past couple weeks has been decreased to about every other day  -Recommend start on Asmanex 2 puffs once a day  -Use albuterol as needed  -Follow-up in 1 month  Orders:  •  mometasone (Asmanex, 30 Metered Doses,) 110 mcg/actuation AEPB inhaler; Inhale 2 puffs in the morning Rinse mouth after use.  •  albuterol (PROVENTIL HFA,VENTOLIN HFA) 90 mcg/act inhaler; Inhale 2 puffs every 6 (six) hours as needed for wheezing or shortness of breath

## 2025-06-12 NOTE — TELEPHONE ENCOUNTER
Called and left message for patient to return call to the office to schedule annual physical for next year with Dr. Ryder Carrion.

## (undated) DEVICE — GLOVE INDICATOR PI UNDERGLOVE SZ 7 BLUE

## (undated) DEVICE — MARYLAND JAW OPEN SEALER/DIVIDER COATED 23CM: Brand: LIGASURE

## (undated) DEVICE — SUT VICRYL 0 CT-1 36 IN J946H

## (undated) DEVICE — PACK C-SECTION PBDS

## (undated) DEVICE — SUT MONOCRYL 4-0 PS-2 27 IN Y426H

## (undated) DEVICE — SUT VICRYL 0 CTX 36 IN J978H

## (undated) DEVICE — CHLORAPREP HI-LITE 26ML ORANGE

## (undated) DEVICE — GLOVE SRG BIOGEL ECLIPSE 7

## (undated) DEVICE — SUT PLAIN 3-0 CT-1 27 IN 842H

## (undated) DEVICE — ABG MICROSTICKS SAFETY